# Patient Record
Sex: MALE | Race: WHITE | NOT HISPANIC OR LATINO | Employment: FULL TIME | URBAN - METROPOLITAN AREA
[De-identification: names, ages, dates, MRNs, and addresses within clinical notes are randomized per-mention and may not be internally consistent; named-entity substitution may affect disease eponyms.]

---

## 2017-12-12 ENCOUNTER — HOSPITAL ENCOUNTER (OUTPATIENT)
Dept: CARDIOLOGY | Facility: HOSPITAL | Age: 57
Discharge: HOME | End: 2017-12-12
Attending: INTERNAL MEDICINE | Admitting: INTERNAL MEDICINE

## 2019-05-14 ENCOUNTER — HOSPITAL ENCOUNTER (OUTPATIENT)
Dept: GASTROENTEROLOGY | Facility: HOSPITAL | Age: 59
Setting detail: HOSPITAL OUTPATIENT SURGERY
Discharge: HOME | End: 2019-05-14
Attending: INTERNAL MEDICINE

## 2019-05-14 LAB — GLUCOSE BLD-MCNC: 96 MG/DL (ref 70–99)

## 2019-08-02 ENCOUNTER — HOSPITAL ENCOUNTER (OUTPATIENT)
Dept: OTHER | Facility: HOSPITAL | Age: 59
Discharge: HOME | End: 2019-08-02
Attending: INTERNAL MEDICINE

## 2019-08-02 ENCOUNTER — OFFICE VISIT CONVERTED (OUTPATIENT)
Dept: INTERNAL MEDICINE | Facility: CLINIC | Age: 59
End: 2019-08-02
Attending: INTERNAL MEDICINE

## 2019-08-02 ENCOUNTER — CONVERSION ENCOUNTER (OUTPATIENT)
Dept: INTERNAL MEDICINE | Facility: CLINIC | Age: 59
End: 2019-08-02

## 2019-08-02 LAB
ALBUMIN SERPL-MCNC: 4.1 G/DL (ref 3.5–5)
ALBUMIN/GLOB SERPL: 1.4 {RATIO} (ref 1.4–2.6)
ALP SERPL-CCNC: 62 U/L (ref 56–119)
ALT SERPL-CCNC: 16 U/L (ref 10–40)
ANION GAP SERPL CALC-SCNC: 19 MMOL/L (ref 8–19)
AST SERPL-CCNC: 21 U/L (ref 15–50)
BASOPHILS # BLD AUTO: 0.17 10*3/UL (ref 0–0.2)
BASOPHILS NFR BLD AUTO: 1.4 % (ref 0–3)
BILIRUB SERPL-MCNC: 0.35 MG/DL (ref 0.2–1.3)
BUN SERPL-MCNC: 13 MG/DL (ref 5–25)
BUN/CREAT SERPL: 19 {RATIO} (ref 6–20)
CALCIUM SERPL-MCNC: 8.9 MG/DL (ref 8.7–10.4)
CHLORIDE SERPL-SCNC: 99 MMOL/L (ref 99–111)
CHOLEST SERPL-MCNC: 103 MG/DL (ref 107–200)
CHOLEST/HDLC SERPL: 2.5 {RATIO} (ref 3–6)
CONV ABS IMM GRAN: 0.03 10*3/UL (ref 0–0.2)
CONV CO2: 23 MMOL/L (ref 22–32)
CONV CREATININE URINE, RANDOM: 72 MG/DL (ref 10–300)
CONV HIV-1/ HIV-2: NEGATIVE
CONV IMMATURE GRAN: 0.3 % (ref 0–1.8)
CONV MICROALBUM.,U,RANDOM: 32.4 MG/L (ref 0–20)
CONV TOTAL PROTEIN: 7.1 G/DL (ref 6.3–8.2)
CREAT UR-MCNC: 0.69 MG/DL (ref 0.7–1.2)
DEPRECATED RDW RBC AUTO: 53.1 FL (ref 35.1–43.9)
EOSINOPHIL # BLD AUTO: 0.24 10*3/UL (ref 0–0.7)
EOSINOPHIL # BLD AUTO: 2 % (ref 0–7)
ERYTHROCYTE [DISTWIDTH] IN BLOOD BY AUTOMATED COUNT: 23.3 % (ref 11.6–14.4)
EST. AVERAGE GLUCOSE BLD GHB EST-MCNC: 105 MG/DL
FERRITIN SERPL-MCNC: 9 NG/ML (ref 30–300)
GFR SERPLBLD BASED ON 1.73 SQ M-ARVRAT: >60 ML/MIN/{1.73_M2}
GLOBULIN UR ELPH-MCNC: 3 G/DL (ref 2–3.5)
GLUCOSE SERPL-MCNC: 84 MG/DL (ref 70–99)
HBA1C MFR BLD: 5.3 % (ref 3.5–5.7)
HBA1C MFR BLD: 7.9 G/DL (ref 14–18)
HCT VFR BLD AUTO: 30.6 % (ref 42–52)
HDLC SERPL-MCNC: 42 MG/DL (ref 40–60)
IRON SATN MFR SERPL: 3 % (ref 20–55)
IRON SERPL-MCNC: 13 UG/DL (ref 70–180)
LDLC SERPL CALC-MCNC: 46 MG/DL (ref 70–100)
LYMPHOCYTES # BLD AUTO: 1.98 10*3/UL (ref 1–5)
MCH RBC QN AUTO: 17.5 PG (ref 27–31)
MCHC RBC AUTO-ENTMCNC: 25.8 G/DL (ref 33–37)
MCV RBC AUTO: 67.7 FL (ref 80–96)
MICROALBUMIN/CREAT UR: 45 MG/G{CRE} (ref 0–25)
MONOCYTES # BLD AUTO: 1.04 10*3/UL (ref 0.2–1.2)
MONOCYTES NFR BLD AUTO: 8.7 % (ref 3–10)
NEUTROPHILS # BLD AUTO: 8.48 10*3/UL (ref 2–8)
NEUTROPHILS NFR BLD AUTO: 71 % (ref 30–85)
NRBC CBCN: 5.7 % (ref 0–0.7)
OSMOLALITY SERPL CALC.SUM OF ELEC: 281 MOSM/KG (ref 273–304)
PLATELET # BLD AUTO: 683 10*3/UL (ref 130–400)
PMV BLD AUTO: 10.1 FL (ref 9.4–12.4)
POTASSIUM SERPL-SCNC: 4.7 MMOL/L (ref 3.5–5.3)
RBC # BLD AUTO: 4.52 10*6/UL (ref 4.7–6.1)
SODIUM SERPL-SCNC: 136 MMOL/L (ref 135–147)
T4 FREE SERPL-MCNC: 1.7 NG/DL (ref 0.9–1.8)
TIBC SERPL-MCNC: 466 UG/DL (ref 245–450)
TRANSFERRIN SERPL-MCNC: 326 MG/DL (ref 215–365)
TRIGL SERPL-MCNC: 74 MG/DL (ref 40–150)
TSH SERPL-ACNC: 1.12 M[IU]/L (ref 0.27–4.2)
VARIANT LYMPHS NFR BLD MANUAL: 16.6 % (ref 20–45)
VLDLC SERPL-MCNC: 15 MG/DL (ref 5–37)
WBC # BLD AUTO: 11.94 10*3/UL (ref 4.8–10.8)

## 2019-08-03 LAB
CONV HEPATITIS C AB WITH REFLEX TO CONFIRMATION: <0.1 S/CO RATIO (ref 0–0.9)
CONV HEPATITIS COMMENT: NORMAL

## 2019-08-13 ENCOUNTER — HOSPITAL ENCOUNTER (OUTPATIENT)
Dept: INFUSION THERAPY | Facility: HOSPITAL | Age: 59
Setting detail: RECURRING SERIES
Discharge: HOME | End: 2019-08-31
Attending: INTERNAL MEDICINE

## 2019-08-16 LAB — PSA SERPL-MCNC: 2.02 NG/ML (ref 0–4)

## 2019-09-04 ENCOUNTER — OFFICE VISIT CONVERTED (OUTPATIENT)
Dept: INTERNAL MEDICINE | Facility: CLINIC | Age: 59
End: 2019-09-04
Attending: INTERNAL MEDICINE

## 2019-09-17 ENCOUNTER — OFFICE VISIT CONVERTED (OUTPATIENT)
Dept: SURGERY | Facility: CLINIC | Age: 59
End: 2019-09-17
Attending: UROLOGY

## 2019-09-17 ENCOUNTER — CONVERSION ENCOUNTER (OUTPATIENT)
Dept: SURGERY | Facility: CLINIC | Age: 59
End: 2019-09-17

## 2019-09-25 ENCOUNTER — OFFICE VISIT CONVERTED (OUTPATIENT)
Dept: INTERNAL MEDICINE | Facility: CLINIC | Age: 59
End: 2019-09-25
Attending: PHYSICIAN ASSISTANT

## 2019-09-25 ENCOUNTER — HOSPITAL ENCOUNTER (OUTPATIENT)
Dept: OTHER | Facility: HOSPITAL | Age: 59
Discharge: HOME | End: 2019-09-25
Attending: PHYSICIAN ASSISTANT

## 2019-09-25 LAB
ALBUMIN SERPL-MCNC: 4.3 G/DL (ref 3.5–5)
ALBUMIN/GLOB SERPL: 1.2 {RATIO} (ref 1.4–2.6)
ALP SERPL-CCNC: 89 U/L (ref 56–119)
ALT SERPL-CCNC: 10 U/L (ref 10–40)
ANION GAP SERPL CALC-SCNC: 21 MMOL/L (ref 8–19)
AST SERPL-CCNC: 17 U/L (ref 15–50)
BASOPHILS # BLD AUTO: 0.11 10*3/UL (ref 0–0.2)
BASOPHILS NFR BLD AUTO: 0.8 % (ref 0–3)
BILIRUB SERPL-MCNC: 0.19 MG/DL (ref 0.2–1.3)
BUN SERPL-MCNC: 12 MG/DL (ref 5–25)
BUN/CREAT SERPL: 14 {RATIO} (ref 6–20)
CALCIUM SERPL-MCNC: 9.6 MG/DL (ref 8.7–10.4)
CHLORIDE SERPL-SCNC: 95 MMOL/L (ref 99–111)
CONV ABS IMM GRAN: 0.03 10*3/UL (ref 0–0.2)
CONV CO2: 24 MMOL/L (ref 22–32)
CONV IMMATURE GRAN: 0.2 % (ref 0–1.8)
CONV TOTAL PROTEIN: 7.9 G/DL (ref 6.3–8.2)
CREAT UR-MCNC: 0.83 MG/DL (ref 0.7–1.2)
CRP SERPL-MCNC: 26.4 MG/L (ref 0–5)
DEPRECATED RDW RBC AUTO: ABNORMAL FL (ref 35.1–43.9)
EOSINOPHIL # BLD AUTO: 0.07 10*3/UL (ref 0–0.7)
EOSINOPHIL # BLD AUTO: 0.5 % (ref 0–7)
ERYTHROCYTE [DISTWIDTH] IN BLOOD BY AUTOMATED COUNT: ABNORMAL % (ref 11.6–14.4)
GFR SERPLBLD BASED ON 1.73 SQ M-ARVRAT: >60 ML/MIN/{1.73_M2}
GLOBULIN UR ELPH-MCNC: 3.6 G/DL (ref 2–3.5)
GLUCOSE SERPL-MCNC: 117 MG/DL (ref 70–99)
HCT VFR BLD AUTO: 42.6 % (ref 42–52)
HGB BLD-MCNC: 12 G/DL (ref 14–18)
IRON SATN MFR SERPL: 8 % (ref 20–55)
IRON SERPL-MCNC: 30 UG/DL (ref 70–180)
LYMPHOCYTES # BLD AUTO: 2.54 10*3/UL (ref 1–5)
LYMPHOCYTES NFR BLD AUTO: 18.7 % (ref 20–45)
MCH RBC QN AUTO: 22.3 PG (ref 27–31)
MCHC RBC AUTO-ENTMCNC: 28.2 G/DL (ref 33–37)
MCV RBC AUTO: 79 FL (ref 80–96)
MONOCYTES # BLD AUTO: 1.31 10*3/UL (ref 0.2–1.2)
MONOCYTES NFR BLD AUTO: 9.7 % (ref 3–10)
NEUTROPHILS # BLD AUTO: 9.49 10*3/UL (ref 2–8)
NEUTROPHILS NFR BLD AUTO: 70.1 % (ref 30–85)
NRBC CBCN: 0 % (ref 0–0.7)
OSMOLALITY SERPL CALC.SUM OF ELEC: 283 MOSM/KG (ref 273–304)
PLATELET # BLD AUTO: 471 10*3/UL (ref 130–400)
PMV BLD AUTO: 10.1 FL (ref 9.4–12.4)
POTASSIUM SERPL-SCNC: 4.2 MMOL/L (ref 3.5–5.3)
RBC # BLD AUTO: 5.39 10*6/UL (ref 4.7–6.1)
SODIUM SERPL-SCNC: 136 MMOL/L (ref 135–147)
TIBC SERPL-MCNC: 392 UG/DL (ref 245–450)
TRANSFERRIN SERPL-MCNC: 274 MG/DL (ref 215–365)
WBC # BLD AUTO: 13.55 10*3/UL (ref 4.8–10.8)

## 2019-09-27 ENCOUNTER — OFFICE VISIT CONVERTED (OUTPATIENT)
Dept: INTERNAL MEDICINE | Facility: CLINIC | Age: 59
End: 2019-09-27
Attending: INTERNAL MEDICINE

## 2019-10-30 ENCOUNTER — HOSPITAL ENCOUNTER (OUTPATIENT)
Dept: INFUSION THERAPY | Facility: HOSPITAL | Age: 59
Setting detail: RECURRING SERIES
Discharge: HOME | End: 2019-10-31
Attending: INTERNAL MEDICINE

## 2020-02-24 ENCOUNTER — CONVERSION ENCOUNTER (OUTPATIENT)
Dept: INTERNAL MEDICINE | Facility: CLINIC | Age: 60
End: 2020-02-24

## 2020-02-24 ENCOUNTER — OFFICE VISIT CONVERTED (OUTPATIENT)
Dept: INTERNAL MEDICINE | Facility: CLINIC | Age: 60
End: 2020-02-24
Attending: INTERNAL MEDICINE

## 2020-02-24 ENCOUNTER — HOSPITAL ENCOUNTER (OUTPATIENT)
Dept: OTHER | Facility: HOSPITAL | Age: 60
Discharge: HOME | End: 2020-02-24
Attending: INTERNAL MEDICINE

## 2020-02-24 LAB
ALBUMIN SERPL-MCNC: 4.4 G/DL (ref 3.5–5)
ALBUMIN/GLOB SERPL: 1.6 {RATIO} (ref 1.4–2.6)
ALP SERPL-CCNC: 74 U/L (ref 56–119)
ALT SERPL-CCNC: 20 U/L (ref 10–40)
ANION GAP SERPL CALC-SCNC: 22 MMOL/L (ref 8–19)
AST SERPL-CCNC: 25 U/L (ref 15–50)
BASOPHILS # BLD AUTO: 0.08 10*3/UL (ref 0–0.2)
BASOPHILS NFR BLD AUTO: 0.8 % (ref 0–3)
BILIRUB SERPL-MCNC: 0.27 MG/DL (ref 0.2–1.3)
BUN SERPL-MCNC: 9 MG/DL (ref 5–25)
BUN/CREAT SERPL: 13 {RATIO} (ref 6–20)
CALCIUM SERPL-MCNC: 9.5 MG/DL (ref 8.7–10.4)
CHLORIDE SERPL-SCNC: 97 MMOL/L (ref 99–111)
CHOLEST SERPL-MCNC: 112 MG/DL (ref 107–200)
CHOLEST/HDLC SERPL: 2.1 {RATIO} (ref 3–6)
CONV ABS IMM GRAN: 0.03 10*3/UL (ref 0–0.2)
CONV CO2: 21 MMOL/L (ref 22–32)
CONV CREATININE URINE, RANDOM: 26.7 MG/DL (ref 10–300)
CONV IMMATURE GRAN: 0.3 % (ref 0–1.8)
CONV MICROALBUM.,U,RANDOM: <12 MG/L (ref 0–20)
CONV TOTAL PROTEIN: 7.1 G/DL (ref 6.3–8.2)
CREAT UR-MCNC: 0.69 MG/DL (ref 0.7–1.2)
DEPRECATED RDW RBC AUTO: 62.9 FL (ref 35.1–43.9)
EOSINOPHIL # BLD AUTO: 0.51 10*3/UL (ref 0–0.7)
EOSINOPHIL # BLD AUTO: 5.1 % (ref 0–7)
ERYTHROCYTE [DISTWIDTH] IN BLOOD BY AUTOMATED COUNT: 18.8 % (ref 11.6–14.4)
EST. AVERAGE GLUCOSE BLD GHB EST-MCNC: 105 MG/DL
GFR SERPLBLD BASED ON 1.73 SQ M-ARVRAT: >60 ML/MIN/{1.73_M2}
GLOBULIN UR ELPH-MCNC: 2.7 G/DL (ref 2–3.5)
GLUCOSE SERPL-MCNC: 77 MG/DL (ref 70–99)
HBA1C MFR BLD: 5.3 % (ref 3.5–5.7)
HCT VFR BLD AUTO: 44.9 % (ref 42–52)
HDLC SERPL-MCNC: 54 MG/DL (ref 40–60)
HGB BLD-MCNC: 14.5 G/DL (ref 14–18)
LDLC SERPL CALC-MCNC: 36 MG/DL (ref 70–100)
LYMPHOCYTES # BLD AUTO: 2.17 10*3/UL (ref 1–5)
LYMPHOCYTES NFR BLD AUTO: 21.9 % (ref 20–45)
MCH RBC QN AUTO: 29.3 PG (ref 27–31)
MCHC RBC AUTO-ENTMCNC: 32.3 G/DL (ref 33–37)
MCV RBC AUTO: 90.7 FL (ref 80–96)
MICROALBUMIN/CREAT UR: 44.9 MG/G{CRE} (ref 0–25)
MONOCYTES # BLD AUTO: 0.91 10*3/UL (ref 0.2–1.2)
MONOCYTES NFR BLD AUTO: 9.2 % (ref 3–10)
NEUTROPHILS # BLD AUTO: 6.22 10*3/UL (ref 2–8)
NEUTROPHILS NFR BLD AUTO: 62.7 % (ref 30–85)
NRBC CBCN: 0 % (ref 0–0.7)
OSMOLALITY SERPL CALC.SUM OF ELEC: 277 MOSM/KG (ref 273–304)
PLATELET # BLD AUTO: 423 10*3/UL (ref 130–400)
PMV BLD AUTO: 12.6 FL (ref 9.4–12.4)
POTASSIUM SERPL-SCNC: 4.9 MMOL/L (ref 3.5–5.3)
RBC # BLD AUTO: 4.95 10*6/UL (ref 4.7–6.1)
SODIUM SERPL-SCNC: 135 MMOL/L (ref 135–147)
T4 FREE SERPL-MCNC: 1.7 NG/DL (ref 0.9–1.8)
TRIGL SERPL-MCNC: 111 MG/DL (ref 40–150)
TSH SERPL-ACNC: 1.13 M[IU]/L (ref 0.27–4.2)
VLDLC SERPL-MCNC: 22 MG/DL (ref 5–37)
WBC # BLD AUTO: 9.92 10*3/UL (ref 4.8–10.8)

## 2020-06-30 ENCOUNTER — OFFICE VISIT CONVERTED (OUTPATIENT)
Dept: INTERNAL MEDICINE | Facility: CLINIC | Age: 60
End: 2020-06-30
Attending: INTERNAL MEDICINE

## 2020-06-30 ENCOUNTER — HOSPITAL ENCOUNTER (OUTPATIENT)
Dept: OTHER | Facility: HOSPITAL | Age: 60
Discharge: HOME | End: 2020-06-30
Attending: INTERNAL MEDICINE

## 2020-06-30 LAB
BASOPHILS # BLD AUTO: 0.08 10*3/UL (ref 0–0.2)
BASOPHILS NFR BLD AUTO: 0.9 % (ref 0–3)
CONV ABS IMM GRAN: 0.02 10*3/UL (ref 0–0.2)
CONV IMMATURE GRAN: 0.2 % (ref 0–1.8)
DEPRECATED RDW RBC AUTO: 53.3 FL (ref 35.1–43.9)
EOSINOPHIL # BLD AUTO: 0.15 10*3/UL (ref 0–0.7)
EOSINOPHIL # BLD AUTO: 1.7 % (ref 0–7)
ERYTHROCYTE [DISTWIDTH] IN BLOOD BY AUTOMATED COUNT: 15.4 % (ref 11.6–14.4)
HCT VFR BLD AUTO: 41.3 % (ref 42–52)
HGB BLD-MCNC: 13.4 G/DL (ref 14–18)
LYMPHOCYTES # BLD AUTO: 1.81 10*3/UL (ref 1–5)
LYMPHOCYTES NFR BLD AUTO: 20.5 % (ref 20–45)
MCH RBC QN AUTO: 30.5 PG (ref 27–31)
MCHC RBC AUTO-ENTMCNC: 32.4 G/DL (ref 33–37)
MCV RBC AUTO: 94.1 FL (ref 80–96)
MONOCYTES # BLD AUTO: 0.77 10*3/UL (ref 0.2–1.2)
MONOCYTES NFR BLD AUTO: 8.7 % (ref 3–10)
NEUTROPHILS # BLD AUTO: 6 10*3/UL (ref 2–8)
NEUTROPHILS NFR BLD AUTO: 68 % (ref 30–85)
NRBC CBCN: 0 % (ref 0–0.7)
PLATELET # BLD AUTO: 346 10*3/UL (ref 130–400)
PMV BLD AUTO: 12.4 FL (ref 9.4–12.4)
RBC # BLD AUTO: 4.39 10*6/UL (ref 4.7–6.1)
WBC # BLD AUTO: 8.83 10*3/UL (ref 4.8–10.8)

## 2020-07-01 LAB
ALBUMIN SERPL-MCNC: 4.2 G/DL (ref 3.5–5)
ALBUMIN/GLOB SERPL: 1.5 {RATIO} (ref 1.4–2.6)
ALP SERPL-CCNC: 60 U/L (ref 56–119)
ALT SERPL-CCNC: 17 U/L (ref 10–40)
ANION GAP SERPL CALC-SCNC: 18 MMOL/L (ref 8–19)
AST SERPL-CCNC: 33 U/L (ref 15–50)
BILIRUB SERPL-MCNC: 0.62 MG/DL (ref 0.2–1.3)
BUN SERPL-MCNC: 7 MG/DL (ref 5–25)
BUN/CREAT SERPL: 9 {RATIO} (ref 6–20)
CALCIUM SERPL-MCNC: 9.5 MG/DL (ref 8.7–10.4)
CHLORIDE SERPL-SCNC: 100 MMOL/L (ref 99–111)
CHOLEST SERPL-MCNC: 119 MG/DL (ref 107–200)
CHOLEST/HDLC SERPL: 2.2 {RATIO} (ref 3–6)
CONV CO2: 25 MMOL/L (ref 22–32)
CONV TOTAL PROTEIN: 7 G/DL (ref 6.3–8.2)
CREAT UR-MCNC: 0.81 MG/DL (ref 0.7–1.2)
EST. AVERAGE GLUCOSE BLD GHB EST-MCNC: 105 MG/DL
GFR SERPLBLD BASED ON 1.73 SQ M-ARVRAT: >60 ML/MIN/{1.73_M2}
GLOBULIN UR ELPH-MCNC: 2.8 G/DL (ref 2–3.5)
GLUCOSE SERPL-MCNC: 96 MG/DL (ref 70–99)
HBA1C MFR BLD: 5.3 % (ref 3.5–5.7)
HDLC SERPL-MCNC: 53 MG/DL (ref 40–60)
LDLC SERPL CALC-MCNC: 52 MG/DL (ref 70–100)
OSMOLALITY SERPL CALC.SUM OF ELEC: 284 MOSM/KG (ref 273–304)
POTASSIUM SERPL-SCNC: 4.9 MMOL/L (ref 3.5–5.3)
SODIUM SERPL-SCNC: 138 MMOL/L (ref 135–147)
T4 FREE SERPL-MCNC: 1.7 NG/DL (ref 0.9–1.8)
TRIGL SERPL-MCNC: 69 MG/DL (ref 40–150)
TSH SERPL-ACNC: 0.87 M[IU]/L (ref 0.27–4.2)
VLDLC SERPL-MCNC: 14 MG/DL (ref 5–37)

## 2020-08-21 ENCOUNTER — HOSPITAL ENCOUNTER (OUTPATIENT)
Dept: URGENT CARE | Facility: CLINIC | Age: 60
Discharge: HOME | End: 2020-08-21

## 2020-08-24 LAB — SARS-COV-2 RNA SPEC QL NAA+PROBE: NOT DETECTED

## 2020-09-25 ENCOUNTER — HOSPITAL ENCOUNTER (OUTPATIENT)
Dept: URGENT CARE | Facility: CLINIC | Age: 60
Discharge: HOME | End: 2020-09-25

## 2020-09-29 LAB — SARS-COV-2 RNA SPEC QL NAA+PROBE: NOT DETECTED

## 2020-10-05 ENCOUNTER — HOSPITAL ENCOUNTER (OUTPATIENT)
Dept: URGENT CARE | Facility: CLINIC | Age: 60
Discharge: HOME | End: 2020-10-05

## 2020-10-08 LAB — SARS-COV-2 RNA SPEC QL NAA+PROBE: NOT DETECTED

## 2020-10-15 ENCOUNTER — OFFICE VISIT (OUTPATIENT)
Dept: CARDIOLOGY | Facility: CLINIC | Age: 60
End: 2020-10-15

## 2020-10-15 VITALS
WEIGHT: 164 LBS | DIASTOLIC BLOOD PRESSURE: 78 MMHG | SYSTOLIC BLOOD PRESSURE: 152 MMHG | HEIGHT: 75 IN | HEART RATE: 75 BPM | BODY MASS INDEX: 20.39 KG/M2

## 2020-10-15 DIAGNOSIS — E11.9 TYPE 2 DIABETES MELLITUS WITHOUT COMPLICATION, UNSPECIFIED WHETHER LONG TERM INSULIN USE (HCC): ICD-10-CM

## 2020-10-15 DIAGNOSIS — I10 ESSENTIAL HYPERTENSION: ICD-10-CM

## 2020-10-15 DIAGNOSIS — I42.9 CARDIOMYOPATHY, UNSPECIFIED TYPE (HCC): ICD-10-CM

## 2020-10-15 DIAGNOSIS — I25.10 CORONARY ARTERY DISEASE INVOLVING NATIVE CORONARY ARTERY OF NATIVE HEART WITHOUT ANGINA PECTORIS: Primary | ICD-10-CM

## 2020-10-15 DIAGNOSIS — E78.2 MIXED HYPERLIPIDEMIA: ICD-10-CM

## 2020-10-15 PROBLEM — I21.3 ST ELEVATION (STEMI) MYOCARDIAL INFARCTION (HCC): Status: ACTIVE | Noted: 2020-10-15

## 2020-10-15 PROBLEM — E78.5 HYPERLIPIDEMIA: Status: ACTIVE | Noted: 2020-10-15

## 2020-10-15 PROBLEM — E03.9 HYPOTHYROIDISM: Status: ACTIVE | Noted: 2020-10-15

## 2020-10-15 PROCEDURE — 93000 ELECTROCARDIOGRAM COMPLETE: CPT | Performed by: INTERNAL MEDICINE

## 2020-10-15 PROCEDURE — 99214 OFFICE O/P EST MOD 30 MIN: CPT | Performed by: INTERNAL MEDICINE

## 2020-10-15 RX ORDER — LOSARTAN POTASSIUM 100 MG/1
1 TABLET ORAL DAILY
COMMUNITY
Start: 2020-07-22 | End: 2021-08-02 | Stop reason: SDUPTHER

## 2020-10-15 RX ORDER — LISINOPRIL 10 MG/1
1 TABLET ORAL DAILY
COMMUNITY
Start: 2012-11-21 | End: 2020-10-15

## 2020-10-15 RX ORDER — ASPIRIN 81 MG/1
1 TABLET, COATED ORAL DAILY
COMMUNITY
Start: 2020-07-27 | End: 2022-01-17

## 2020-10-15 RX ORDER — EZETIMIBE AND SIMVASTATIN 10; 40 MG/1; MG/1
1 TABLET ORAL DAILY
COMMUNITY
Start: 2012-11-21 | End: 2020-10-15

## 2020-10-15 RX ORDER — EMPAGLIFLOZIN 25 MG/1
1 TABLET, FILM COATED ORAL DAILY
COMMUNITY
Start: 2020-07-11 | End: 2020-10-15

## 2020-10-15 RX ORDER — SIMVASTATIN 40 MG
1 TABLET ORAL DAILY
COMMUNITY
Start: 2020-09-29 | End: 2021-09-14 | Stop reason: SDUPTHER

## 2020-10-15 RX ORDER — LEVOTHYROXINE SODIUM 0.15 MG/1
1 TABLET ORAL DAILY
COMMUNITY
Start: 2020-08-25 | End: 2021-06-07 | Stop reason: SDUPTHER

## 2020-10-15 NOTE — PROGRESS NOTES
Date of Office Visit: 10/15/2020  Encounter Provider:Dr. Froy Mclain  Place of Service: [facility] [place]  Patient Name: Juan Miguel Chairez  :1960  Alex South Jr., MD    Chief Complaint   Patient presents with   • Coronary Artery Disease     2 year follow up   • Hypertension   • Hyperlipidemia   • Cardiomyopathy   • Diabetes     History of Present Illness    I am pleased to see Mr. Chairez in my office today for a follow-up     As you know, patient is 56 years old white gentleman whose past medical history is significant for hypertension, hyperlipidemia, diabetes mellitus, coronary artery disease, status post coronary artery bypass graft, cardiomyopathy, who came today for yearly  followup.     In , patient had cardiac workup for his symptom of chest pain.  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  Patient underwent cardiac catheterization which showed 100% lad stenosis, 25% LCX stenosis, RCA had 90% stenosis.  Patient underwent  left ventricle aneurysmectomy and CABG. In 2017, patient had echocardiogram which showed EF of 55% and akinetic distal anterior wall.  Mild aortic stenosis noted.    Patient came today for yearly follow-up.  Patient came after 2 years.  Patient is doing very well.  He lost 40 pounds by intention.  He is without antidiabetic medicine.  Patient denies any chest pain or tightness or heaviness.  No orthopnea or PND no leg edema noted.  No palpitation.    EKG showed sinus rhythm with PVCs.  Left atrial enlargement and abnormality noted.    At this stage, I would recommend that patient should proceed with echocardiogram.  Clinically patient has relatively loud ejection systolic murmur.  Patient had mild aortic stenosis in the past.  I suspect it has progressed.  I would also assess left ventricle function.  His blood pressure is elevated but his blood pressure at home has been stable.  Continue current  therapy.        Past Medical History:   Diagnosis Date   • Benign essential HTN    • CAD (coronary artery disease) of artery bypass graft    • Cardiomyopathy (CMS/HCC)    • Coronary artery disease    • Diabetes (CMS/HCC)    • Hyperlipidemia, mixed          Past Surgical History:   Procedure Laterality Date   • ABDOMINAL AORTIC ANEURYSM REPAIR     • CORONARY ARTERY BYPASS GRAFT  2010           Current Outpatient Medications:   •  Aspirin Low Dose 81 MG EC tablet, 1 tablet Daily., Disp: , Rfl:   •  levothyroxine (SYNTHROID, LEVOTHROID) 150 MCG tablet, 1 tablet Daily., Disp: , Rfl:   •  losartan (COZAAR) 100 MG tablet, 1 tablet Daily., Disp: , Rfl:   •  metoprolol tartrate (LOPRESSOR) 25 MG tablet, TAKE ONE TABLET BY MOUTH TWICE A DAY, Disp: 60 tablet, Rfl: 2  •  simvastatin (ZOCOR) 40 MG tablet, 1 tablet Daily., Disp: , Rfl:       Social History     Socioeconomic History   • Marital status:      Spouse name: Not on file   • Number of children: Not on file   • Years of education: Not on file   • Highest education level: Not on file   Tobacco Use   • Smoking status: Former Smoker   • Smokeless tobacco: Never Used   Substance and Sexual Activity   • Alcohol use: Yes   • Drug use: Never   • Sexual activity: Defer         Review of Systems   Constitution: Negative for chills and fever.   HENT: Negative for ear discharge and nosebleeds.    Eyes: Negative for discharge and redness.   Cardiovascular: Negative for chest pain, orthopnea, palpitations, paroxysmal nocturnal dyspnea and syncope.   Respiratory: Positive for shortness of breath. Negative for cough and wheezing.    Endocrine: Negative for heat intolerance.   Skin: Negative for rash.   Musculoskeletal: Positive for arthritis and joint pain. Negative for myalgias.   Gastrointestinal: Negative for abdominal pain, melena, nausea and vomiting.   Genitourinary: Negative for dysuria and hematuria.   Neurological: Negative for dizziness, light-headedness, numbness and  "tremors.   Psychiatric/Behavioral: Negative for depression. The patient is not nervous/anxious.        Procedures      ECG 12 Lead    Date/Time: 10/15/2020 12:45 PM  Performed by: Froy Mclain MD  Authorized by: Froy Mclain MD   Comparison: compared with previous ECG   Similar to previous ECG  Rhythm: sinus rhythm  Ectopy: unifocal PVCs    Clinical impression: normal ECG            ECG 12 Lead    (Results Pending)           Objective:    /78   Pulse 75   Ht 190.5 cm (75\")   Wt 74.4 kg (164 lb)   BMI 20.50 kg/m²         Constitutional:       Appearance: Well-developed.   Eyes:      General: No scleral icterus.        Right eye: No discharge.   HENT:      Head: Normocephalic and atraumatic.   Neck:      Thyroid: No thyromegaly.      Lymphadenopathy: No cervical adenopathy.   Pulmonary:      Effort: Pulmonary effort is normal. No respiratory distress.      Breath sounds: Normal breath sounds. No wheezing. No rales.   Cardiovascular:      Normal rate. Regular rhythm.      No gallop.   Abdominal:      Tenderness: There is no abdominal tenderness.   Skin:     Findings: No erythema or rash.   Neurological:      Mental Status: Alert and oriented to person, place, and time.             Assessment:       Diagnosis Plan   1. Coronary artery disease involving native coronary artery of native heart without angina pectoris  ECG 12 Lead    Adult Transthoracic Echo Complete W/ Cont if Necessary Per Protocol   2. Mixed hyperlipidemia  ECG 12 Lead    Adult Transthoracic Echo Complete W/ Cont if Necessary Per Protocol   3. Essential hypertension  ECG 12 Lead    Adult Transthoracic Echo Complete W/ Cont if Necessary Per Protocol   4. Cardiomyopathy, unspecified type (CMS/HCC)  ECG 12 Lead    Adult Transthoracic Echo Complete W/ Cont if Necessary Per Protocol   5. Type 2 diabetes mellitus without complication, unspecified whether long term insulin use (CMS/HCC)  ECG 12 Lead    Adult Transthoracic Echo Complete W/ Cont if " Necessary Per Protocol            Plan:       At this stage continue current therapy.  Blood pressure monitoring is recommended.  Proceed with echocardiogram.

## 2020-11-02 ENCOUNTER — HOSPITAL ENCOUNTER (OUTPATIENT)
Dept: CARDIOLOGY | Facility: HOSPITAL | Age: 60
Discharge: HOME | End: 2020-11-02
Admitting: INTERNAL MEDICINE

## 2020-11-02 VITALS — WEIGHT: 164.02 LBS | HEIGHT: 75 IN | BODY MASS INDEX: 20.39 KG/M2

## 2020-11-02 DIAGNOSIS — I42.9 CARDIOMYOPATHY, UNSPECIFIED TYPE (HCC): ICD-10-CM

## 2020-11-02 DIAGNOSIS — I10 ESSENTIAL HYPERTENSION: ICD-10-CM

## 2020-11-02 DIAGNOSIS — E11.9 TYPE 2 DIABETES MELLITUS WITHOUT COMPLICATION, UNSPECIFIED WHETHER LONG TERM INSULIN USE (HCC): ICD-10-CM

## 2020-11-02 DIAGNOSIS — E78.2 MIXED HYPERLIPIDEMIA: ICD-10-CM

## 2020-11-02 DIAGNOSIS — I25.10 CORONARY ARTERY DISEASE INVOLVING NATIVE CORONARY ARTERY OF NATIVE HEART WITHOUT ANGINA PECTORIS: ICD-10-CM

## 2020-11-02 PROCEDURE — 93306 TTE W/DOPPLER COMPLETE: CPT

## 2020-11-02 PROCEDURE — 93306 TTE W/DOPPLER COMPLETE: CPT | Performed by: INTERNAL MEDICINE

## 2020-11-06 ENCOUNTER — TELEPHONE (OUTPATIENT)
Dept: CARDIOLOGY | Facility: CLINIC | Age: 60
End: 2020-11-06

## 2020-11-06 LAB
BH CV ECHO MEAS - ACS: 0.41 CM
BH CV ECHO MEAS - AI DEC SLOPE: 329.1 CM/SEC^2
BH CV ECHO MEAS - AI DEC TIME: 1.3 SEC
BH CV ECHO MEAS - AI MAX PG: 71.2 MMHG
BH CV ECHO MEAS - AI MAX VEL: 421.3 CM/SEC
BH CV ECHO MEAS - AI P1/2T: 375 MSEC
BH CV ECHO MEAS - AO MAX PG (FULL): 47.9 MMHG
BH CV ECHO MEAS - AO MAX PG: 50.2 MMHG
BH CV ECHO MEAS - AO MEAN PG (FULL): 12.5 MMHG
BH CV ECHO MEAS - AO MEAN PG: 13.4 MMHG
BH CV ECHO MEAS - AO ROOT AREA (BSA CORRECTED): 2
BH CV ECHO MEAS - AO ROOT AREA: 12.2 CM^2
BH CV ECHO MEAS - AO ROOT DIAM: 3.9 CM
BH CV ECHO MEAS - AO V2 MAX: 344.5 CM/SEC
BH CV ECHO MEAS - AO V2 MEAN: 172.4 CM/SEC
BH CV ECHO MEAS - AO V2 VTI: 60.5 CM
BH CV ECHO MEAS - AVA(I,A): 0.83 CM^2
BH CV ECHO MEAS - AVA(I,D): 0.83 CM^2
BH CV ECHO MEAS - AVA(V,A): 0.68 CM^2
BH CV ECHO MEAS - AVA(V,D): 0.68 CM^2
BH CV ECHO MEAS - BSA(HAYCOCK): 2 M^2
BH CV ECHO MEAS - BSA: 2 M^2
BH CV ECHO MEAS - BZI_BMI: 20.5 KILOGRAMS/M^2
BH CV ECHO MEAS - BZI_METRIC_HEIGHT: 190.5 CM
BH CV ECHO MEAS - BZI_METRIC_WEIGHT: 74.4 KG
BH CV ECHO MEAS - EDV(CUBED): 127.6 ML
BH CV ECHO MEAS - EDV(MOD-SP4): 63.3 ML
BH CV ECHO MEAS - EDV(TEICH): 120.1 ML
BH CV ECHO MEAS - EF(CUBED): 49 %
BH CV ECHO MEAS - EF(MOD-SP4): 39.2 %
BH CV ECHO MEAS - EF(TEICH): 41 %
BH CV ECHO MEAS - ESV(CUBED): 65.1 ML
BH CV ECHO MEAS - ESV(MOD-SP4): 38.4 ML
BH CV ECHO MEAS - ESV(TEICH): 70.9 ML
BH CV ECHO MEAS - FS: 20.1 %
BH CV ECHO MEAS - IVS/LVPW: 1.1
BH CV ECHO MEAS - IVSD: 1.3 CM
BH CV ECHO MEAS - LA DIMENSION: 3.4 CM
BH CV ECHO MEAS - LA/AO: 0.86
BH CV ECHO MEAS - LV DIASTOLIC VOL/BSA (35-75): 31.4 ML/M^2
BH CV ECHO MEAS - LV MASS(C)D: 253 GRAMS
BH CV ECHO MEAS - LV MASS(C)DI: 125.5 GRAMS/M^2
BH CV ECHO MEAS - LV MAX PG: 2.3 MMHG
BH CV ECHO MEAS - LV MEAN PG: 0.93 MMHG
BH CV ECHO MEAS - LV SYSTOLIC VOL/BSA (12-30): 19.1 ML/M^2
BH CV ECHO MEAS - LV V1 MAX: 76 CM/SEC
BH CV ECHO MEAS - LV V1 MEAN: 42.6 CM/SEC
BH CV ECHO MEAS - LV V1 VTI: 16.4 CM
BH CV ECHO MEAS - LVIDD: 5 CM
BH CV ECHO MEAS - LVIDS: 4 CM
BH CV ECHO MEAS - LVOT AREA: 3.1 CM^2
BH CV ECHO MEAS - LVOT DIAM: 2 CM
BH CV ECHO MEAS - LVPWD: 1.2 CM
BH CV ECHO MEAS - MR MAX PG: 153 MMHG
BH CV ECHO MEAS - MR MAX VEL: 618.3 CM/SEC
BH CV ECHO MEAS - MV A MAX VEL: 90.3 CM/SEC
BH CV ECHO MEAS - MV E MAX VEL: 164.2 CM/SEC
BH CV ECHO MEAS - MV E/A: 1.8
BH CV ECHO MEAS - MV MAX PG: 17.7 MMHG
BH CV ECHO MEAS - MV MEAN PG: 7.7 MMHG
BH CV ECHO MEAS - MV V2 MAX: 210.1 CM/SEC
BH CV ECHO MEAS - MV V2 MEAN: 129.9 CM/SEC
BH CV ECHO MEAS - MV V2 VTI: 51.9 CM
BH CV ECHO MEAS - MVA(VTI): 0.97 CM^2
BH CV ECHO MEAS - PA ACC TIME: 0.12 SEC
BH CV ECHO MEAS - PA MAX PG: 11.6 MMHG
BH CV ECHO MEAS - PA PR(ACCEL): 23.8 MMHG
BH CV ECHO MEAS - PA V2 MAX: 170.3 CM/SEC
BH CV ECHO MEAS - PI END-D VEL: 124 CM/SEC
BH CV ECHO MEAS - RAP SYSTOLE: 10 MMHG
BH CV ECHO MEAS - RVDD: 2.3 CM
BH CV ECHO MEAS - RVSP: 70.5 MMHG
BH CV ECHO MEAS - SI(AO): 367.1 ML/M^2
BH CV ECHO MEAS - SI(CUBED): 31 ML/M^2
BH CV ECHO MEAS - SI(LVOT): 25 ML/M^2
BH CV ECHO MEAS - SI(MOD-SP4): 12.3 ML/M^2
BH CV ECHO MEAS - SI(TEICH): 24.4 ML/M^2
BH CV ECHO MEAS - SV(AO): 740.4 ML
BH CV ECHO MEAS - SV(CUBED): 62.5 ML
BH CV ECHO MEAS - SV(LVOT): 50.5 ML
BH CV ECHO MEAS - SV(MOD-SP4): 24.8 ML
BH CV ECHO MEAS - SV(TEICH): 49.2 ML
BH CV ECHO MEAS - TR MAX VEL: 350.1 CM/SEC
MAXIMAL PREDICTED HEART RATE: 162 BPM
STRESS TARGET HR: 138 BPM

## 2020-11-09 ENCOUNTER — TELEPHONE (OUTPATIENT)
Dept: CARDIOLOGY | Facility: CLINIC | Age: 60
End: 2020-11-09

## 2020-11-17 ENCOUNTER — HOSPITAL ENCOUNTER (OUTPATIENT)
Dept: URGENT CARE | Facility: CLINIC | Age: 60
Discharge: HOME | End: 2020-11-17

## 2020-11-20 LAB — SARS-COV-2 RNA SPEC QL NAA+PROBE: NOT DETECTED

## 2020-11-24 ENCOUNTER — OFFICE VISIT CONVERTED (OUTPATIENT)
Dept: INTERNAL MEDICINE | Facility: CLINIC | Age: 60
End: 2020-11-24
Attending: INTERNAL MEDICINE

## 2020-11-24 ENCOUNTER — OFFICE VISIT CONVERTED (OUTPATIENT)
Dept: SURGERY | Facility: CLINIC | Age: 60
End: 2020-11-24
Attending: SURGERY

## 2021-01-04 ENCOUNTER — OFFICE VISIT CONVERTED (OUTPATIENT)
Dept: INTERNAL MEDICINE | Facility: CLINIC | Age: 61
End: 2021-01-04
Attending: INTERNAL MEDICINE

## 2021-01-04 ENCOUNTER — HOSPITAL ENCOUNTER (OUTPATIENT)
Dept: OTHER | Facility: HOSPITAL | Age: 61
Discharge: HOME | End: 2021-01-04
Attending: INTERNAL MEDICINE

## 2021-01-04 LAB
ALBUMIN SERPL-MCNC: 4 G/DL (ref 3.5–5)
ALBUMIN/GLOB SERPL: 1.4 {RATIO} (ref 1.4–2.6)
ALP SERPL-CCNC: 152 U/L (ref 56–119)
ALT SERPL-CCNC: 37 U/L (ref 10–40)
ANION GAP SERPL CALC-SCNC: 17 MMOL/L (ref 8–19)
AST SERPL-CCNC: 38 U/L (ref 15–50)
BASOPHILS # BLD AUTO: 0.05 10*3/UL (ref 0–0.2)
BASOPHILS NFR BLD AUTO: 0.5 % (ref 0–3)
BILIRUB SERPL-MCNC: 0.54 MG/DL (ref 0.2–1.3)
BUN SERPL-MCNC: 9 MG/DL (ref 5–25)
BUN/CREAT SERPL: 12 {RATIO} (ref 6–20)
CALCIUM SERPL-MCNC: 9.3 MG/DL (ref 8.7–10.4)
CHLORIDE SERPL-SCNC: 99 MMOL/L (ref 99–111)
CHOLEST SERPL-MCNC: 150 MG/DL (ref 107–200)
CHOLEST/HDLC SERPL: 2.7 {RATIO} (ref 3–6)
CONV ABS IMM GRAN: 0.02 10*3/UL (ref 0–0.2)
CONV CO2: 24 MMOL/L (ref 22–32)
CONV IMMATURE GRAN: 0.2 % (ref 0–1.8)
CONV TOTAL PROTEIN: 6.8 G/DL (ref 6.3–8.2)
CREAT UR-MCNC: 0.75 MG/DL (ref 0.7–1.2)
DEPRECATED RDW RBC AUTO: 53.1 FL (ref 35.1–43.9)
EOSINOPHIL # BLD AUTO: 0.14 10*3/UL (ref 0–0.7)
EOSINOPHIL # BLD AUTO: 1.5 % (ref 0–7)
ERYTHROCYTE [DISTWIDTH] IN BLOOD BY AUTOMATED COUNT: 15.9 % (ref 11.6–14.4)
GFR SERPLBLD BASED ON 1.73 SQ M-ARVRAT: >60 ML/MIN/{1.73_M2}
GLOBULIN UR ELPH-MCNC: 2.8 G/DL (ref 2–3.5)
GLUCOSE SERPL-MCNC: 85 MG/DL (ref 70–99)
HCT VFR BLD AUTO: 39 % (ref 42–52)
HDLC SERPL-MCNC: 56 MG/DL (ref 40–60)
HGB BLD-MCNC: 12.9 G/DL (ref 14–18)
LDLC SERPL CALC-MCNC: 79 MG/DL (ref 70–100)
LYMPHOCYTES # BLD AUTO: 1.78 10*3/UL (ref 1–5)
LYMPHOCYTES NFR BLD AUTO: 19.1 % (ref 20–45)
MCH RBC QN AUTO: 30.1 PG (ref 27–31)
MCHC RBC AUTO-ENTMCNC: 33.1 G/DL (ref 33–37)
MCV RBC AUTO: 90.9 FL (ref 80–96)
MONOCYTES # BLD AUTO: 0.81 10*3/UL (ref 0.2–1.2)
MONOCYTES NFR BLD AUTO: 8.7 % (ref 3–10)
NEUTROPHILS # BLD AUTO: 6.51 10*3/UL (ref 2–8)
NEUTROPHILS NFR BLD AUTO: 70 % (ref 30–85)
NRBC CBCN: 0 % (ref 0–0.7)
OSMOLALITY SERPL CALC.SUM OF ELEC: 278 MOSM/KG (ref 273–304)
PLATELET # BLD AUTO: 371 10*3/UL (ref 130–400)
PMV BLD AUTO: 11.6 FL (ref 9.4–12.4)
POTASSIUM SERPL-SCNC: 4.8 MMOL/L (ref 3.5–5.3)
PSA SERPL-MCNC: 2.63 NG/ML (ref 0–4)
RBC # BLD AUTO: 4.29 10*6/UL (ref 4.7–6.1)
SODIUM SERPL-SCNC: 135 MMOL/L (ref 135–147)
TRIGL SERPL-MCNC: 77 MG/DL (ref 40–150)
TSH SERPL-ACNC: 1.4 M[IU]/L (ref 0.27–4.2)
VLDLC SERPL-MCNC: 15 MG/DL (ref 5–37)
WBC # BLD AUTO: 9.31 10*3/UL (ref 4.8–10.8)

## 2021-01-05 LAB
EST. AVERAGE GLUCOSE BLD GHB EST-MCNC: 97 MG/DL
HBA1C MFR BLD: 5 % (ref 3.5–5.7)

## 2021-05-03 ENCOUNTER — OFFICE VISIT (OUTPATIENT)
Dept: CARDIOLOGY | Facility: CLINIC | Age: 61
End: 2021-05-03

## 2021-05-03 VITALS
WEIGHT: 163.8 LBS | HEART RATE: 62 BPM | HEIGHT: 75 IN | DIASTOLIC BLOOD PRESSURE: 80 MMHG | SYSTOLIC BLOOD PRESSURE: 160 MMHG | BODY MASS INDEX: 20.37 KG/M2 | OXYGEN SATURATION: 99 %

## 2021-05-03 DIAGNOSIS — I10 ESSENTIAL HYPERTENSION: ICD-10-CM

## 2021-05-03 DIAGNOSIS — I42.9 CARDIOMYOPATHY, UNSPECIFIED TYPE (HCC): ICD-10-CM

## 2021-05-03 DIAGNOSIS — I35.0 AORTIC VALVE STENOSIS, ETIOLOGY OF CARDIAC VALVE DISEASE UNSPECIFIED: ICD-10-CM

## 2021-05-03 DIAGNOSIS — I25.10 CORONARY ARTERY DISEASE INVOLVING NATIVE CORONARY ARTERY OF NATIVE HEART WITHOUT ANGINA PECTORIS: Primary | ICD-10-CM

## 2021-05-03 PROCEDURE — 93000 ELECTROCARDIOGRAM COMPLETE: CPT | Performed by: INTERNAL MEDICINE

## 2021-05-03 PROCEDURE — 99214 OFFICE O/P EST MOD 30 MIN: CPT | Performed by: INTERNAL MEDICINE

## 2021-05-03 NOTE — PROGRESS NOTES
Date of Office Visit: 2021  Encounter Provider: Dr. Froy Mclain    Place of Service: [facility] [place]  Patient Name: Juan Miguel Chairez  :1960  Alex South Jr., MD    Chief Complaint   Patient presents with   • Coronary Artery Disease  Aortic stenosis     History of Present Illness    I am pleased to see Mr. Chairez in my office today for a follow-up     As you know, patient is 60 years old white gentleman whose past medical history is significant for hypertension, hyperlipidemia, diabetes mellitus, coronary artery disease, status post coronary artery bypass graft, cardiomyopathy, who came today for yearly  followup.     In , patient had cardiac workup for his symptom of chest pain.  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  Patient underwent cardiac catheterization which showed 100% lad stenosis, 25% LCX stenosis, RCA had 90% stenosis.  Patient underwent  left ventricle aneurysmectomy and CABG. In 2017, patient had echocardiogram which showed EF of 55% and akinetic distal anterior wall.  Mild aortic stenosis noted.    In 2021, patient underwent echocardiogram which showed EF of 50 to 55%.  Severe aortic stenosis noted with aortic valve area of 0.83 cm² and peak velocity of 3.5 m/s noted.  Peak gradient was 50 mmHg and mean gradient was 30.4 mmHg.    Patient came today to discuss his results of echocardiogram.  Patient is noted to have severe aortic stenosis on echocardiogram.  Patient is absolutely not having any symptom.  Patient walks for 90 minutes every day and does not have any shortness of breath.  Patient denies any symptom of chest pain or tightness or heaviness.  No orthopnea PND no syncope or presyncope.  No leg edema noted.    EKG showed sinus rhythm.  Nonspecific ST changes    Patient had severe aortic stenosis but there is no symptom.  I would continue to observe.  I would repeat echocardiogram in 6 to 9 months.   Patient is educated about the symptom of aortic stenosis..            Past Medical History:   Diagnosis Date   • Benign essential HTN    • CAD (coronary artery disease) of artery bypass graft    • Cardiomyopathy (CMS/HCC)    • Coronary artery disease    • Diabetes (CMS/HCC)    • Hyperlipidemia, mixed          Past Surgical History:   Procedure Laterality Date   • ABDOMINAL AORTIC ANEURYSM REPAIR     • CORONARY ARTERY BYPASS GRAFT  2010           Current Outpatient Medications:   •  Aspirin Low Dose 81 MG EC tablet, 1 tablet Daily., Disp: , Rfl:   •  levothyroxine (SYNTHROID, LEVOTHROID) 150 MCG tablet, 1 tablet Daily., Disp: , Rfl:   •  losartan (COZAAR) 100 MG tablet, 1 tablet Daily., Disp: , Rfl:   •  metoprolol tartrate (LOPRESSOR) 25 MG tablet, TAKE ONE TABLET BY MOUTH TWICE A DAY, Disp: 180 tablet, Rfl: 1  •  simvastatin (ZOCOR) 40 MG tablet, 1 tablet Daily., Disp: , Rfl:       Social History     Socioeconomic History   • Marital status:      Spouse name: Not on file   • Number of children: Not on file   • Years of education: Not on file   • Highest education level: Not on file   Tobacco Use   • Smoking status: Former Smoker   • Smokeless tobacco: Never Used   Vaping Use   • Vaping Use: Never used   Substance and Sexual Activity   • Alcohol use: Yes   • Drug use: Never   • Sexual activity: Defer         Review of Systems   Constitutional: Negative for chills and fever.   HENT: Negative for ear discharge and nosebleeds.    Eyes: Negative for discharge and redness.   Cardiovascular: Negative for chest pain, orthopnea, palpitations, paroxysmal nocturnal dyspnea and syncope.   Respiratory: Negative for cough, shortness of breath and wheezing.    Endocrine: Negative for heat intolerance.   Skin: Negative for rash.   Musculoskeletal: Negative for arthritis and myalgias.   Gastrointestinal: Negative for abdominal pain, melena, nausea and vomiting.   Genitourinary: Negative for dysuria and hematuria.  "  Neurological: Negative for dizziness, light-headedness, numbness and tremors.   Psychiatric/Behavioral: Negative for depression. The patient is not nervous/anxious.        Procedures      ECG 12 Lead    Date/Time: 5/3/2021 11:30 AM  Performed by: Froy Mclain MD  Authorized by: Froy Mclain MD   Comparison: compared with previous ECG   Similar to previous ECG  Rhythm: sinus rhythm  Other findings: non-specific ST-T wave changes    Clinical impression: normal ECG            ECG 12 Lead    (Results Pending)           Objective:    /80   Pulse 62   Ht 190.5 cm (75\")   Wt 74.3 kg (163 lb 12.8 oz)   SpO2 99%   BMI 20.47 kg/m²         Constitutional:       Appearance: Well-developed.   Eyes:      General: No scleral icterus.        Right eye: No discharge.   HENT:      Head: Normocephalic and atraumatic.   Neck:      Thyroid: No thyromegaly.      Lymphadenopathy: No cervical adenopathy.   Pulmonary:      Effort: Pulmonary effort is normal. No respiratory distress.      Breath sounds: Normal breath sounds. No wheezing. No rales.   Cardiovascular:      Normal rate. Regular rhythm.      Murmurs: There is a grade 4/6 systolic murmur.      No gallop.   Abdominal:      Tenderness: There is no abdominal tenderness.   Skin:     Findings: No erythema or rash.   Neurological:      Mental Status: Alert and oriented to person, place, and time.             Assessment:       Diagnosis Plan   1. Coronary artery disease involving native coronary artery of native heart without angina pectoris  ECG 12 Lead   2. Essential hypertension  ECG 12 Lead   3. Cardiomyopathy, unspecified type (CMS/HCC)  ECG 12 Lead   4. ST elevation myocardial infarction (STEMI), unspecified artery (CMS/HCC)  ECG 12 Lead   5. Aortic valve stenosis, etiology of cardiac valve disease unspecified  ECG 12 Lead            Plan:       MDM:    1.  Aortic stenosis:    Patient has severe aortic stenosis.  However patient is totally asymptomatic.  I would " recommend observation.  Repeat echocardiogram in 9 months    2.  CAD:    Patient is not having any symptom of angina pectoris.  Recommend observation.    3.  Cardiomyopathy/LV aneurysm:    Patient has history of LV aneurysm resection in the past.  Repeat echocardiogram showed EF of 50 to 55%.  Recommend observation    4.  Hypertension:    I would continue current treatment.  Blood pressure is well controlled at home.  He has whitecoat hypertension

## 2021-05-13 NOTE — PROGRESS NOTES
Progress Note      Patient Name: Juan Miguel Chairez   Patient ID: [id number]   Sex: Male   YOB: 1960    Primary Care Provider: Alex South MD   Referring Provider: Riley Fan MD    Visit Date: June 30, 2020    Provider: Alex South MD   Location: [facility]   Location Address: [address] [place] [address], [address]  [place], [place]  [id number]   Location Phone: [phone]          Chief Complaint  · Follow Up HTN      History Of Present Illness  Juan Miguel Chairez is a 60 year old /White male who presents for evaluation and treatment of:      HTN- pt denies dizziness, HAs, CP  HLD- doing well on vytorin  DM2- pt with continued weight loss through diet and exercise.   hypothyroid- due for recheck.       Past Medical History  Disease Name Date Onset Notes   Diabetes mellitus, type 2 --  --    Hyperlipidemia --  --    Hypertension --  --    Hypothyroidism --  --    Lymphoma, Malignant 1984 Hodgkin's         Past Surgical History  Procedure Name Date Notes   CABG --  --    Colonoscopy 2013 [facility]   heart surgery 2011 aortic Aneurysm Repair and double bypass   Splenectomy 1983  --          Medication List  Name Date Started Instructions   Adult Low Dose Aspirin 81 mg oral tablet,delayed release (DR/EC) 04/30/2020 TAKE ONE TABLET BY MOUTH DAILY   levothyroxine 150 mcg oral tablet 05/28/2020 TAKE ONE TABLET BY MOUTH DAILY   losartan 100 mg oral tablet 01/27/2020 TAKE ONE TABLET BY MOUTH DAILY   metoprolol tartrate 25 mg oral tablet 01/08/2020 TAKE ONE TABLET BY MOUTH TWICE A DAY   ProAir HFA 90 mcg/actuation inhalation HFA aerosol inhaler 09/25/2019 inhale 1 - 2 puffs (90 - 180 mcg) by inhalation route every 4-6 hours as needed   Vytorin 10-40 10-40 mg oral tablet 01/27/2020 TAKE ONE TABLET BY MOUTH DAILY         Allergy List  Allergen Name Date Reaction Notes   NO KNOWN DRUG ALLERGIES --  --  --        Allergies Reconciled  Family Medical History  Disease Name Relative/Age Notes  "  Stroke Father/   --    - No Family History of Colorectal Cancer  --          Social History  Finding Status Start/Stop Quantity Notes   Alcohol Current some day --/-- occasional --    Tobacco Former --/-- less than 1 ppd quit 2010         Immunizations  NameDate Admin Mfg Trade Name Lot Number Route Inj VIS Given VIS Publication   Hepatitis A02/24/2020 [facility] HAVRIX-ADULT [id number] IM RD 02/24/2020    Comments: Pt tolerated well and left office in stable condition   Hepatitis A08/02/2019 [facility] HAVRIX-ADULT [id number] IM RD 08/02/2019    Comments: Pt tolerated well and left office in stable condition   Meningococcal (MNG)08/02/2019 [facility]C MENACTRA [id number] IM LD 08/02/2019    Comments: Pt tolerated well and left office in stable condition         Review of Systems  · Constitutional  o Denies  o : fever, fatigue, weight loss, weight gain  · HENT  o Denies  o : headaches, lightheadedness  · Cardiovascular  o Denies  o : lower extremity edema, chest pressure, palpitations  · Respiratory  o Denies  o : shortness of breath, wheezing, frequent cough, dyspnea on exertion  · Gastrointestinal  o Denies  o : nausea, vomiting, diarrhea, constipation, abdominal pain      Vitals  Date Time BP Position Site L\R Cuff Size HR RR TEMP (F) WT  HT  BMI kg/m2 BSA m2 O2 Sat HC       09/27/2019 10:45 /78 Sitting    82 - R  97.5 200lbs 0oz 5'  8\" 30.41 2.09 97 %    02/24/2020 11:18 /72 Sitting    68 - R 16 96.8 178lbs 4oz 5'  8\" 27.1 1.97 98 %    06/30/2020 10:57 /70 Sitting    62 - R  96.4 166lbs 8oz 5'  8\" 25.32 1.9 100 %          Physical Examination  · Constitutional  o Appearance  o : no acute distress, well-nourished  · Head and Face  o Head  o :   § Inspection  § : atraumatic, normocephalic  · Eyes  o Eyes  o : extraocular movements intact, no scleral icterus, no conjunctival injection  · Ears, Nose, Mouth and Throat  o Ears  o :   § External Ears  § : normal  o Nose  o :   § Intranasal Exam  § : nares patent  o Oral Cavity  o : "   § Oral Mucosa  § : moist mucous membranes  · Respiratory  o Respiratory Effort  o : breathing comfortably, symmetric chest rise  o Auscultation of Lungs  o : clear to asculatation bilaterally, no wheezes, rales, or rhonchii  · Cardiovascular  o Heart  o :   § Auscultation of Heart  § : regular rate and rhythm, 3/6 KARISSA heard best at LUSB. no rubs, or gallops  o Peripheral Vascular System  o :   § Extremities  § : no edema  · Neurologic  o Mental Status Examination  o :   § Orientation  § : grossly oriented to person, place and time  o Gait and Station  o :   § Gait Screening  § : normal gait  · Psychiatric  o General  o : normal mood and affect          Assessment  · Hypertension     401.9/I10  well controlled. concerned with possibly going to low with weight loss. will cont monitoring. educated to call or RTC with any lightheadedness, dizziness.   · Diabetes mellitus, type 2     250.00/E11.9  check labs. well controlled based on last HgbA1c.   · Hyperlipidemia     272.4/E78.5  cont vytorin. check labs.   · Hypothyroidism     244.9/E03.9  check TSH and adjust synthroid accordingly.     Problems Reconciled  Plan  · Orders  o Diabetes 1 Panel (CMP, Lipid, A1c) [facility] (43416, 34970, 39619) - 250.00/E11.9 - 06/30/2020  o CBC with Auto Diff [facility] (17287) - 250.00/E11.9 - 06/30/2020  o ACO-41: Dilated Diabetic eye exam completed this year and results in chart/reviewed (2022F) - 250.00/E11.9 - 06/30/2020  o Thyroid Profile (78422, THYII, 09766) - 244.9/E03.9 - 06/30/2020  o ACO-39: Current medications updated and reviewed () - - 06/30/2020  o ACO-15: Pneumococcal Vaccine Administered or Previously Received (4040F) - - 06/30/2020  o ACO-19: Colorectal cancer screening results documented and reviewed (3017F) - - 06/30/2020  · Medications  o Medications have been Reconciled  o Transition of Care or Provider Policy  · Instructions  o Patient was educated/instructed on their diagnosis, treatment and medications prior to  discharge from the clinic today.  · Disposition  o f/u in 6 months  o labs done in clinic            Electronically Signed by: Alex Sotuh MD -Author on June 30, 2020 11:34:33 AM

## 2021-05-13 NOTE — PROGRESS NOTES
Progress Note      Patient Name: Juan Miguel Chairez   Patient ID: [id number]   Sex: Male   YOB: 1960    Primary Care Provider: Alex South MD   Referring Provider: Riley Fan MD    Visit Date: November 24, 2020    Provider: Alex South MD   Location: [facility]   Location Address: [address] [place] [address] 3  [place], [place]  [id number]   Location Phone: [phone]          Chief Complaint  · Follow up office visit within 7 calendar days of discharge from inpatient status (high complexity).      History Of Present Illness  FOLLOW UP OFFICE VISIT WITHIN 7 CALENDAR DAYS OF INPATIENT STATUS (SEVERE COMPLEXITY)  Juan Miguel Chairez presents to office for follow up post discharge from inpatient status within 7 calendar days. Patient was contacted within 2 business days via phone conversation. Documentation of that phone contact is present in the patient's electronic chart. Patient was admitted to an inpatient faciliity on 11/12/2020 and discharged on 11/17/2020 due to: shortness of breath   Admitting MD: Mehul Johnson   His discharge summary has been reviewed and placed in the patient's electronic chart.   Patient's problem list is: Diabetes mellitus, type 2, Hyperlipidemia, Hypertension, and Hypothyroidism   Patient's outpatient medication list has been reconciled with the medication list from the discharge summary and has been reviewed with the patient. Current medication list is: aspirin 81 mg oral tablet,delayed release (DR/EC), calcium polycarbophil 625 mg oral tablet, Colace 100 mg oral capsule, Gummy Multivitamin 1 gummy oral, levothyroxine 150 mcg oral tablet, losartan 100 mg oral tablet, metoprolol tartrate 25 mg oral tablet, Miralax 17 gram oral powder in packet, and simvastatin 40 mg oral tablet   Juan Miguel Chairez is a 60 year old /White male who presents for evaluation and treatment of:      pt admitted for SBO requiring NGT placement. pt did not have  surgical correction. pt reports feeling better with good po intake and normal BMs.   HTN- pt thinks BP is a little higher due to be stress about making appointment.   HLD- doing well on statin.          Past Medical History  Disease Name Date Onset Notes   Diabetes mellitus, type 2 --  --    Hyperlipidemia --  --    Hypertension --  --    Hypothyroidism --  --    Lymphoma, Malignant 1984 Hodgkin's         Past Surgical History  Procedure Name Date Notes   CABG --  --    Colonoscopy 2013 [facility]   heart surgery 2011 aortic Aneurysm Repair and double bypass   Splenectomy 1983  --          Medication List  Name Date Started Instructions   aspirin 81 mg oral tablet,delayed release (DR/EC) 07/27/2020 TAKE ONE TABLET BY MOUTH DAILY   calcium polycarbophil 625 mg oral tablet  take 1 tablet by oral route once a day (in the evening)   Colace 100 mg oral capsule  take 1 capsule (100 mg) by oral route once daily for 30 days   Gummy Multivitamin 1 gummy oral  take one gummy PO QDAY   levothyroxine 150 mcg oral tablet 11/24/2020 TAKE ONE TABLET BY MOUTH DAILY   losartan 100 mg oral tablet 01/27/2020 TAKE ONE TABLET BY MOUTH DAILY   metoprolol tartrate 25 mg oral tablet 01/08/2020 TAKE ONE TABLET BY MOUTH TWICE A DAY   simvastatin 40 mg oral tablet 09/29/2020 TAKE ONE TABLET BY MOUTH EVERY EVENING         Allergy List  Allergen Name Date Reaction Notes   NO KNOWN DRUG ALLERGIES --  --  --        Allergies Reconciled  Family Medical History  Disease Name Relative/Age Notes   Stroke Father/   --    - No Family History of Colorectal Cancer  --          Social History  Finding Status Start/Stop Quantity Notes   Alcohol Current some day --/-- occasional --    Tobacco Former --/-- less than 1 ppd quit 2010         Immunizations  NameDate Admin Mfg Trade Name Lot Number Route Inj VIS Given VIS Publication   Hepatitis A02/24/2020 SKB HAVRIX-ADULT [id number] IM RD 02/24/2020    Comments: Pt tolerated well and left office in stable condition  "  Hepatitis A08/02/2019 SKB HAVRIX-ADULT [id number] IM RD 08/02/2019    Comments: Pt tolerated well and left office in stable condition   Meningococcal (MNG)08/02/2019 PMC MENACTRA [id number] IM LD 08/02/2019    Comments: Pt tolerated well and left office in stable condition         Review of Systems  · Constitutional  o Denies  o : fever, weight gain, weight loss, fatigue  · Cardiovascular  o Denies  o : palpitation, chest Pain, lower extremity edema  · Respiratory  o Denies  o : shortness of breath, wheezing, dry cough, productive cough  · Gastrointestinal  o Denies  o : nausea, vomiting, diarrhea, constipation, abdominal pain  · Neurologic  o Denies  o : unsteady gait, weakness, dizziness, H/A      Vitals  Date Time BP Position Site L\R Cuff Size HR RR TEMP (F) WT  HT  BMI kg/m2 BSA m2 O2 Sat FR L/min FiO2 HC       02/24/2020 11:18 /72 Sitting    68 - R 16 96.8 178lbs 4oz 5'  8\" 27.1 1.97 98 %  21%    06/30/2020 10:57 /70 Sitting    62 - R  96.4 166lbs 8oz 5'  8\" 25.32 1.9 100 %      11/24/2020 12:29 /88 Sitting    75 - R  97.8 163lbs 4oz 5'  8\" 24.82 1.88 99 %            Physical Examination  · Constitutional  o Appearance  o : no acute distress, well-nourished  · Head and Face  o Head  o :   § Inspection  § : atraumatic, normocephalic  · Eyes  o Eyes  o : extraocular movements intact, no scleral icterus, no conjunctival injection  · Ears, Nose, Mouth and Throat  o Ears  o :   § External Ears  § : normal  o Nose  o :   § Intranasal Exam  § : nares patent  o Oral Cavity  o :   § Oral Mucosa  § : moist mucous membranes  · Respiratory  o Respiratory Effort  o : breathing comfortably, symmetric chest rise  o Auscultation of Lungs  o : clear to asculatation bilaterally, no wheezes, rales, or rhonchii  · Cardiovascular  o Heart  o :   § Auscultation of Heart  § : regular rate and rhythm, no murmurs, rubs, or gallops  o Peripheral Vascular System  o :   § Extremities  § : no edema  · Neurologic  o Mental " Status Examination  o :   § Orientation  § : grossly oriented to person, place and time  o Gait and Station  o :   § Gait Screening  § : normal gait  · Psychiatric  o General  o : normal mood and affect          Assessment  · Hypertension     401.9/I10  elevated today, but thought 2/2 anxiety over making appt. cont monitoring.   · Hyperlipidemia     272.4/E78.5  cont statin  · Small bowel obstruction     560.9/K56.609  doing well. may trial off Colace. if constipation ensues, may restart.     Problems Reconciled  Plan  · Orders  o Discharge medications reconciled with the current medication list (1111F) - - 11/24/2020  o ACO-39: Current medications updated and reviewed (, 1159F) - - 11/24/2020  · Medications  o Colace 100 mg oral capsule   SIG: take 1 capsule (100 mg) by oral route once daily for 30 days   DISP: (30) Capsule with 1 refills  Discontinued on 11/24/2020     o Medications have been Reconciled  o Transition of Care or Provider Policy  · Instructions  o Advised that cheeses and other sources of dairy fats, animal fats, fast food, and the extras (candy, pastries, pies, doughnuts and cookies) all contain LDL raising nutrients. Advised to increase fruits, vegetables, whole grains, and to monitor portion sizes.   o Patient discharge summary has been reviewed and placed in patient's electronic medical record.  o Patient received a phone call from my office within 2 business days of discharge from inpatient status, and documented within the patient's chart.  o Also patient was seen (face to face) for follow up evaluation within 7 calendar days of discharge from inpatient status for a high complexity issue.  o Patient was educated on their diagnosis, treatment and any medication changes while being evaluated today.  o Patient was educated/instructed on their diagnosis, treatment and medications prior to discharge from the clinic today.  · Disposition  o Call or Return if symptoms worsen or  persist.            Electronically Signed by: Alex South MD -Author on November 24, 2020 01:11:58 PM

## 2021-05-13 NOTE — PROGRESS NOTES
Progress Note      Patient Name: Juan Miguel Chairez   Patient ID: [id number]   Sex: Male   YOB: 1960    Primary Care Provider: Alex South MD   Referring Provider: Riley Fan MD    Visit Date: November 24, 2020    Provider: Max Bartholomew MD   Location: [facility]   Location Address: [address] [place] [address]  [id number]   Location Phone: [phone]          Chief Complaint  · PSBO      History Of Present Illness     Hrajinder came back for follow-up. He is a very nice gentleman who is the  here in [facility]. He recently was in the hospital with a partial small bowel obstruction that fortunately resolved without surgery. He has had a lap band done in the past. He told me that he went home and for a few days didn't eat a whole lot but he was able to eat some solid food over the last few days and he is definitely doing better. His bowels are working well. He is not having any abdominal pain or any difficulty keeping food down.       Past Medical History  Diabetes mellitus, type 2; Hyperlipidemia; Hypertension; Hypothyroidism; Lymphoma, Malignant         Past Surgical History  CABG; Colonoscopy; heart surgery; Splenectomy         Medication List  aspirin 81 mg oral tablet,delayed release (DR/EC); calcium polycarbophil 625 mg oral tablet; Gummy Multivitamin 1 gummy oral; levothyroxine 150 mcg oral tablet; losartan 100 mg oral tablet; metoprolol tartrate 25 mg oral tablet; simvastatin 40 mg oral tablet         Allergy List  NO KNOWN DRUG ALLERGIES         Family Medical History  Stroke; - No Family History of Colorectal Cancer         Social History  Alcohol (Current some day); Tobacco (Former)         Immunizations  Name Date Admin   Hepatitis A 02/24/2020   Hepatitis A 08/02/2019   Meningococcal (MNG) 08/02/2019         Vitals  Date Time BP Position Site L\R Cuff Size HR RR TEMP (F) WT  HT  BMI kg/m2 BSA m2 O2 Sat FR L/min FiO2 HC       11/24/2020 12:29 /88  "Sitting    75 - R  97.8 163lbs 4oz 5'  8\" 24.82 1.88 99 %      11/24/2020 02:13 PM       16  162lbs 0oz 5'  8\" 24.63 1.88             Physical Examination     Today on physical exam, he appears well today. His abdomen is soft.           Assessment  · Partial small bowel obstruction     560.9/K56.600       Partial small bowel obstruction. He had a recent admission but is doing much better now.       Plan  · Medications  o Medications have been Reconciled  o Transition of Care or Provider Policy     I have asked Harjinder to call us back if he has any further trouble. He indicated that he would. We will otherwise see him back on an as needed basis.             Electronically Signed by: Dali Lunsford-, -Author on November 24, 2020 03:59:49 PM  Electronically Co-signed by: Max Bartholomew MD -Reviewer on November 30, 2020 09:01:55 AM  "

## 2021-05-14 VITALS
OXYGEN SATURATION: 99 % | WEIGHT: 163.25 LBS | DIASTOLIC BLOOD PRESSURE: 88 MMHG | TEMPERATURE: 97.8 F | BODY MASS INDEX: 24.74 KG/M2 | HEIGHT: 68 IN | SYSTOLIC BLOOD PRESSURE: 148 MMHG | HEART RATE: 75 BPM

## 2021-05-14 VITALS
DIASTOLIC BLOOD PRESSURE: 72 MMHG | OXYGEN SATURATION: 100 % | HEIGHT: 68 IN | SYSTOLIC BLOOD PRESSURE: 142 MMHG | WEIGHT: 161 LBS | TEMPERATURE: 97.6 F | BODY MASS INDEX: 24.4 KG/M2 | HEART RATE: 65 BPM

## 2021-05-14 VITALS — RESPIRATION RATE: 16 BRPM | BODY MASS INDEX: 24.55 KG/M2 | HEIGHT: 68 IN | WEIGHT: 162 LBS

## 2021-05-14 NOTE — PROGRESS NOTES
Progress Note      Patient Name: Juan Miguel Chairez   Patient ID: [id number]   Sex: Male   YOB: 1960    Primary Care Provider: Alex South MD   Referring Provider: Riley Fan MD    Visit Date: January 4, 2021    Provider: Alex South MD   [facility]   Location Address: [address] [place] [address]  [id number]   Location Phone: [phone]          Chief Complaint  · Follow up, no concerns      History Of Present Illness  Juan Miguel Chairez is a 60 year old /White male who presents for evaluation and treatment of:      impaired fasting glucose- pt cont losing weight gradually.   HLD- doing well on statin.  HTN- pt denies HAs, dizziness, CP. pt cont to exercise regularly.   hypothyroid- due for recheck  aortic stenosis - moderate to severe. pt setup for JOSE ANTONIO in spring 2021. cont f/u with cardiology.       Past Medical History  Disease Name Date Onset Notes   Diabetes mellitus, type 2 --  --    Hyperlipidemia --  --    Hypertension --  --    Hypothyroidism --  --    Lymphoma, Malignant 1984 Hodgkin's         Past Surgical History  Procedure Name Date Notes   CABG --  --    Colonoscopy 2013 [facility]   heart surgery 2011 aortic Aneurysm Repair and double bypass   Splenectomy 1983  --          Medication List  Name Date Started Instructions   aspirin 81 mg oral tablet,delayed release (DR/EC) 07/27/2020 TAKE ONE TABLET BY MOUTH DAILY   Gummy Multivitamin 1 gummy oral  take one gummy PO QDAY   levothyroxine 150 mcg oral tablet 11/24/2020 TAKE ONE TABLET BY MOUTH DAILY   losartan 100 mg oral tablet 01/27/2020 TAKE ONE TABLET BY MOUTH DAILY   metoprolol tartrate 25 mg oral tablet 01/08/2020 TAKE ONE TABLET BY MOUTH TWICE A DAY   simvastatin 40 mg oral tablet 09/29/2020 TAKE ONE TABLET BY MOUTH EVERY EVENING         Allergy List  Allergen Name Date Reaction Notes   NO KNOWN DRUG ALLERGIES --  --  --        Allergies Reconciled  Family Medical  "History  Disease Name Relative/Age Notes   Stroke Father/   --    - No Family History of Colorectal Cancer  --          Social History  Finding Status Start/Stop Quantity Notes   Alcohol Current some day --/-- occasional --    Tobacco Former --/-- less than 1 ppd quit 2010         Immunizations  NameDate Admin Mfg Trade Name Lot Number Route Inj VIS Given VIS Publication   Hepatitis A02/24/2020 SKB HAVRIX-ADULT [id number] IM RD 02/24/2020    Comments: Pt tolerated well and left office in stable condition   Hepatitis A08/02/2019 SKB HAVRIX-ADULT [id number] IM RD 08/02/2019    Comments: Pt tolerated well and left office in stable condition   Meningococcal (MNG)08/02/2019 PMC MENACTRA [id number] IM LD 08/02/2019    Comments: Pt tolerated well and left office in stable condition         Review of Systems  · Constitutional  o Admits  o : weight loss  o Denies  o : fever, fatigue, weight gain  · Cardiovascular  o Denies  o : lower extremity edema, chest pressure, palpitations  · Respiratory  o Denies  o : shortness of breath, wheezing, cough, dyspnea on exertion  · Gastrointestinal  o Denies  o : nausea, vomiting, diarrhea, constipation, abdominal pain      Vitals  Date Time BP Position Site L\R Cuff Size HR RR TEMP (F) WT  HT  BMI kg/m2 BSA m2 O2 Sat FR L/min FiO2 HC       11/24/2020 12:29 /88 Sitting    75 - R  97.8 163lbs 4oz 5'  8\" 24.82 1.88 99 %      11/24/2020 02:13 PM       16  162lbs 0oz 5'  8\" 24.63 1.88       01/04/2021 11:16 /72 Sitting    65 - R  97.6 161lbs 0oz 5'  8\" 24.48 1.87 100 %  21%          Physical Examination  · Constitutional  o Appearance  o : no acute distress, well-nourished  · Head and Face  o Head  o :   § Inspection  § : atraumatic, normocephalic  · Eyes  o Eyes  o : extraocular movements intact, no scleral icterus, no conjunctival injection  · Ears, Nose, Mouth and Throat  o Ears  o :   § External Ears  § : normal  o Nose  o :   § Intranasal Exam  § : nares patent  o Oral Cavity  o : "   § Oral Mucosa  § : moist mucous membranes  · Respiratory  o Respiratory Effort  o : breathing comfortably, symmetric chest rise  o Auscultation of Lungs  o : clear to asculatation bilaterally, no wheezes, rales, or rhonchii  · Cardiovascular  o Heart  o :   § Auscultation of Heart  § : regular rate and rhythm, 2/6 KARISSA murmur. No rubs or gallops  o Peripheral Vascular System  o :   § Extremities  § : no edema  · Neurologic  o Mental Status Examination  o :   § Orientation  § : grossly oriented to person, place and time  o Gait and Station  o :   § Gait Screening  § : normal gait  · Psychiatric  o General  o : normal mood and affect          Assessment  · Screening for prostate cancer     V76.44/Z12.5  · Hyperlipidemia     272.4/E78.5  cont statin. check labs.   · Hypothyroidism     244.9/E03.9  check TSH and adjust accordingly  · Impaired fasting glucose     790.21/R73.01  check HgbA1c  · Hypertension     401.9/I10  well controlled on regimen  · Aortic valve stenosis     424.1/I35.0  cont serial monitoring. JOSE ANTONIO in spring 2021. f/u with cardiology.     Problems Reconciled  Plan  · Orders  o Hgb A1c [facility] (90542) - 790.21/R73.01 - 01/04/2021  o Male Physical Primary Care Panel (CMP, CBC, TSH, Lipid, PSA) [facility] (, 24830, [address]159, 57932, 35398, 35565) - 401.9/I10, 272.4/E78.5, 244.9/E03.9, V76.44/Z12.5 - 01/04/2021  o ACO-15: Pneumococcal Vaccine Administered or Previously Received [facility] (4040F) - - 01/04/2021  o ACO-14: Influenza immunization administered or previously received [facility] () - - 01/04/2021  o ACO-19: Colorectal cancer screening results documented and reviewed (3017F) - - 01/04/2021  o ACO-39: Current medications updated and reviewed (1159F, ) - - 01/04/2021  · Medications  o Medications have been Reconciled  o Transition of Care or Provider Policy  · Instructions  o Patient was educated/instructed on their diagnosis, treatment and medications prior to discharge from the clinic  today.  · Disposition  o f/u in 6 months            Electronically Signed by: Alex South MD -Author on January 4, 2021 12:12:01 PM

## 2021-05-15 VITALS
WEIGHT: 205 LBS | HEART RATE: 75 BPM | HEIGHT: 68 IN | OXYGEN SATURATION: 100 % | SYSTOLIC BLOOD PRESSURE: 128 MMHG | BODY MASS INDEX: 31.07 KG/M2 | DIASTOLIC BLOOD PRESSURE: 84 MMHG | TEMPERATURE: 97.4 F

## 2021-05-15 VITALS
BODY MASS INDEX: 31.07 KG/M2 | DIASTOLIC BLOOD PRESSURE: 74 MMHG | HEART RATE: 86 BPM | HEIGHT: 68 IN | SYSTOLIC BLOOD PRESSURE: 122 MMHG | TEMPERATURE: 97.6 F | WEIGHT: 205 LBS | OXYGEN SATURATION: 99 %

## 2021-05-15 VITALS
RESPIRATION RATE: 16 BRPM | HEART RATE: 68 BPM | WEIGHT: 178.25 LBS | HEIGHT: 68 IN | TEMPERATURE: 96.8 F | DIASTOLIC BLOOD PRESSURE: 72 MMHG | OXYGEN SATURATION: 98 % | BODY MASS INDEX: 27.01 KG/M2 | SYSTOLIC BLOOD PRESSURE: 120 MMHG

## 2021-05-15 VITALS
BODY MASS INDEX: 30.78 KG/M2 | TEMPERATURE: 98.4 F | WEIGHT: 203.12 LBS | HEART RATE: 94 BPM | HEIGHT: 68 IN | OXYGEN SATURATION: 97 % | DIASTOLIC BLOOD PRESSURE: 78 MMHG | SYSTOLIC BLOOD PRESSURE: 124 MMHG | RESPIRATION RATE: 15 BRPM

## 2021-05-15 VITALS
WEIGHT: 200 LBS | BODY MASS INDEX: 30.31 KG/M2 | TEMPERATURE: 97.5 F | OXYGEN SATURATION: 97 % | SYSTOLIC BLOOD PRESSURE: 117 MMHG | DIASTOLIC BLOOD PRESSURE: 78 MMHG | HEART RATE: 82 BPM | HEIGHT: 68 IN

## 2021-05-15 VITALS
BODY MASS INDEX: 25.23 KG/M2 | OXYGEN SATURATION: 100 % | WEIGHT: 166.5 LBS | HEART RATE: 62 BPM | HEIGHT: 68 IN | TEMPERATURE: 96.4 F | DIASTOLIC BLOOD PRESSURE: 70 MMHG | SYSTOLIC BLOOD PRESSURE: 117 MMHG

## 2021-05-15 VITALS — BODY MASS INDEX: 31.07 KG/M2 | HEIGHT: 68 IN | RESPIRATION RATE: 16 BRPM | WEIGHT: 205 LBS

## 2021-05-24 ENCOUNTER — CONVERSION ENCOUNTER (OUTPATIENT)
Dept: SURGERY | Facility: CLINIC | Age: 61
End: 2021-05-24

## 2021-05-24 ENCOUNTER — OFFICE VISIT CONVERTED (OUTPATIENT)
Dept: UROLOGY | Facility: CLINIC | Age: 61
End: 2021-05-24
Attending: UROLOGY

## 2021-05-24 LAB
BILIRUB UR QL STRIP: NEGATIVE
COLOR UR: YELLOW
CONV CLARITY OF URINE: CLEAR
CONV PROTEIN IN URINE BY AUTOMATED TEST STRIP: NEGATIVE
CONV UROBILINOGEN IN URINE BY AUTOMATED TEST STRIP: NORMAL
GLUCOSE UR QL: NEGATIVE
HGB UR QL STRIP: NEGATIVE
KETONES UR QL STRIP: NEGATIVE
LEUKOCYTE ESTERASE UR QL STRIP: NEGATIVE
NITRITE UR QL STRIP: NEGATIVE
PH UR STRIP.AUTO: 6 [PH]
SP GR UR: 1.02

## 2021-06-05 NOTE — PROGRESS NOTES
"   Progress Note      Patient Name: Juan Miguel Chairez   Patient ID: [id number]   Sex: Male   YOB: 1960    Primary Care Provider: Alex South MD   Referring Provider: Riley Fan MD    Visit Date: May 24, 2021    Provider: Anthony Lopes MD   Location: [facility]   Location Address: [address] [place] [address]  [id number]   Location Phone: [phone]          Chief Complaint  · urological issues      History Of Present Illness     62      PSA    4/21  2.63  9/19  2.02        \">61 yo male. He is the [facility]. h/o abnormal KULWINDER at [facility].     Patient is voiding without issue, no history of prostate meds.    He has never had a prostate biopsy.    no gross hematuria, dysuria or recurrent urinary tract infections.  No history of kidney stone.    No urologic family history,   Has never had any urologic surgery.    No cardiopulmonary history.  Patient does not smoke.  Patient does not use blood thinner.    1/4/2021 creatinine 0.5, > 60      PSA    4/21  2.63  9/19  2.02               Past Medical History  Diabetes mellitus, type 2; Hyperlipidemia; Hypertension; Hypothyroidism; Lymphoma, Malignant         Past Surgical History  CABG; Colonoscopy; heart surgery; Splenectomy         Medication List  aspirin 81 mg oral tablet,delayed release (DR/EC); Gummy Multivitamin 1 gummy oral; levothyroxine 150 mcg oral tablet; losartan 100 mg oral tablet; metoprolol tartrate 25 mg oral tablet; simvastatin 40 mg oral tablet         Allergy List  NO KNOWN DRUG ALLERGIES       Allergies Reconciled  Family Medical History  Stroke; - No Family History of Colorectal Cancer         Social History  Alcohol (Current some day); Tobacco (Former)         Immunizations  Name Date Admin   Hepatitis A 02/24/2020   Hepatitis A 08/02/2019   Meningococcal (MNG) 08/02/2019         Review of Systems  · Constitutional  o Denies  o : fatigue, night sweats  · Eyes  o Denies  o : double vision, " "blurred vision  · HENT  o Denies  o : vertigo, recent head injury  · Breasts  o Denies  o : abnormal changes in breast size, additional breast symptoms except as noted in the HPI  · Cardiovascular  o Denies  o : chest pain, irregular heart beats  · Respiratory  o Denies  o : shortness of breath, productive cough  · Gastrointestinal  o Denies  o : nausea, vomiting  · Genitourinary  o Admits  o : nocturia  o Denies  o : urgency, frequency, dysuria, hematuria, incontinence, urinary retention, difficulty voiding, decreased stream, post-void dribbling, Flank pain  · Integument  o Denies  o : hair growth change, new skin lesions  · Neurologic  o Denies  o : altered mental status, seizures  · Musculoskeletal  o Denies  o : joint swelling, limitation of motion  · Endocrine  o Denies  o : cold intolerance, heat intolerance  · Heme-Lymph  o Denies  o : petechiae, lymph node enlargement or tenderness  · Allergic-Immunologic  o Denies  o : frequent illnesses      Vitals  Date Time BP Position Site L\R Cuff Size HR RR TEMP (F) WT  HT  BMI kg/m2 BSA m2 O2 Sat FR L/min FiO2        05/24/2021 09:50 AM       13  164lbs 3oz 5'  8\" 24.96 1.89             Physical Examination  · Constitutional  o Appearance  o : Well-appearing, well-developed, in no acute distress  · Respiratory  o Respiratory Effort  o : Unlabored breathing  · Neurologic  o Mental Status Examination  o :   § Orientation  § : Grossly oriented to person, place and time, judgment and insight intact, normal mood and affect       KULWINDER-gram prostate, a little bit asymmetry but no signs of mass or abnormality.  Nontender.           Results  · In-Office Procedures  o Lab procedure  § Automated Dipstick Urinalysis (Surg Spec) WITHOUT Micro [facility] (70665)   § Color Ur: Yellow   § Clarity Ur: Clear   § Glucose Ur Ql Strip: Negative   § Bilirub Ur Ql Strip: Negative   § Ketones Ur Ql Strip: Negative   § Sp Gr Ur Qn: 1.020   § Hgb Ur Ql Strip: Negative   § pH Ur-LsCnc: 6.0   § Prot " Ur Ql Strip: Negative   § Urobilinogen Ur Strip-mCnc: 0.2 E.U./dL   § Nitrite Ur Ql Strip: Negative   § WBC Est Ur Ql Strip: Negative       Assessment  · Prostate cancer screening     V76.44/Z12.5      Plan  · Medications  o Medications have been Reconciled  o Transition of Care or Provider Policy  · Instructions  o Electronically Identified Patient Education Materials Provided Electronically       PSA within normal limits for his age, reviewed today.  KULWINDER normal without issue.  Patient given reassurance    Continue his regular prostate cancer screening through his PCP    Follow-up as needed.             Electronically Signed by: Anthony Lopes MD -Author on May 24, 2021 10:17:15 AM

## 2021-06-07 NOTE — TELEPHONE ENCOUNTER
Caller: Juan Miguel Chairez    Relationship: Self    Best call back number: [phone]    Medication needed:   Requested Prescriptions     Pending Prescriptions Disp Refills   • levothyroxine (SYNTHROID, LEVOTHROID) 150 MCG tablet       Si tablet Daily.       When do you need the refill by: 21    What additional details did the patient provide when requesting the medication: PATIENT NEEDS A 90 DAY SUPPLY    Does the patient have less than a 3 day supply:  [x] Yes  [] No    What is the patient's preferred pharmacy: [facility] [address] AT [facility] [address]  [phone] [facility] [phone] FX

## 2021-06-08 RX ORDER — LEVOTHYROXINE SODIUM 0.15 MG/1
150 TABLET ORAL DAILY
Qty: 90 TABLET | Refills: 0 | Status: SHIPPED | OUTPATIENT
Start: 2021-06-08 | End: 2021-08-17 | Stop reason: SDUPTHER

## 2021-07-15 VITALS — WEIGHT: 164.19 LBS | HEIGHT: 68 IN | RESPIRATION RATE: 13 BRPM | BODY MASS INDEX: 24.89 KG/M2

## 2021-08-02 DIAGNOSIS — I10 ESSENTIAL HYPERTENSION: Primary | ICD-10-CM

## 2021-08-02 NOTE — TELEPHONE ENCOUNTER
Caller: Juan Miguel Chairez    Relationship: Self    Best call back number: [phone]     Medication needed:   Requested Prescriptions     Pending Prescriptions Disp Refills   • losartan (COZAAR) 100 MG tablet       Si tablet Daily.       When do you need the refill by: ASAP    What additional details did the patient provide when requesting the medication: PATIENT HAS BEEN OUT OF THIS MEDICATION FOR A WEEK.    Does the patient have less than a 3 day supply:  [x] Yes  [] No    What is the patient's preferred pharmacy:    [facility] [address] AT [facility] ([address] - [phone]  - [phone]   [phone]

## 2021-08-03 ENCOUNTER — TELEPHONE (OUTPATIENT)
Dept: INTERNAL MEDICINE | Facility: CLINIC | Age: 61
End: 2021-08-03

## 2021-08-03 NOTE — TELEPHONE ENCOUNTER
Caller: YAYAXIN JACQUE [place]  [place] - [address] AT [facility] [address] ([address] - [phone] [facility] [phone] FX    Relationship: Pharmacy    Best call back number: [phone]    What is the best time to reach you: ANY    Who are you requesting to speak with (clinical staff, provider,  specific staff member): CLINICAL    What was the call regarding: PHARMACIST HAS SENT A FAX REQUESTING A REFILL ON [address]. PHARMACIST SAYS YOU CAN EITHER SIGN AND FAX FORM BACK OR YOU CAN CALL HER AND GIVE HER A VERBAL AUTHORIZATION    Do you require a callback: PLEASE CALL

## 2021-08-04 RX ORDER — LOSARTAN POTASSIUM 100 MG/1
100 TABLET ORAL DAILY
Qty: 90 TABLET | Refills: 2 | Status: SHIPPED | OUTPATIENT
Start: 2021-08-04 | End: 2022-04-28

## 2021-08-17 ENCOUNTER — OFFICE VISIT (OUTPATIENT)
Dept: INTERNAL MEDICINE | Facility: CLINIC | Age: 61
End: 2021-08-17

## 2021-08-17 ENCOUNTER — TELEPHONE (OUTPATIENT)
Dept: INTERNAL MEDICINE | Facility: CLINIC | Age: 61
End: 2021-08-17

## 2021-08-17 VITALS
TEMPERATURE: 97.6 F | HEIGHT: 68 IN | WEIGHT: 169 LBS | OXYGEN SATURATION: 99 % | SYSTOLIC BLOOD PRESSURE: 147 MMHG | DIASTOLIC BLOOD PRESSURE: 82 MMHG | RESPIRATION RATE: 14 BRPM | BODY MASS INDEX: 25.61 KG/M2 | HEART RATE: 71 BPM

## 2021-08-17 DIAGNOSIS — E03.9 HYPOTHYROIDISM, UNSPECIFIED TYPE: ICD-10-CM

## 2021-08-17 DIAGNOSIS — I10 ESSENTIAL HYPERTENSION: Primary | ICD-10-CM

## 2021-08-17 DIAGNOSIS — Z90.81 H/O SPLENECTOMY: ICD-10-CM

## 2021-08-17 DIAGNOSIS — Z23 NEED FOR PNEUMOCOCCAL VACCINATION: ICD-10-CM

## 2021-08-17 DIAGNOSIS — E11.9 TYPE 2 DIABETES MELLITUS WITHOUT COMPLICATION, WITHOUT LONG-TERM CURRENT USE OF INSULIN (HCC): ICD-10-CM

## 2021-08-17 DIAGNOSIS — E78.2 MIXED HYPERLIPIDEMIA: ICD-10-CM

## 2021-08-17 LAB
ALBUMIN UR-MCNC: <1.2 MG/DL
BASOPHILS # BLD AUTO: 0.08 10*3/MM3 (ref 0–0.2)
BASOPHILS NFR BLD AUTO: 0.9 % (ref 0–1.5)
CHOLEST SERPL-MCNC: 158 MG/DL (ref 0–200)
DEPRECATED RDW RBC AUTO: 49.8 FL (ref 37–54)
EOSINOPHIL # BLD AUTO: 0.12 10*3/MM3 (ref 0–0.4)
EOSINOPHIL NFR BLD AUTO: 1.4 % (ref 0.3–6.2)
ERYTHROCYTE [DISTWIDTH] IN BLOOD BY AUTOMATED COUNT: 14.6 % (ref 12.3–15.4)
HBA1C MFR BLD: 5.35 % (ref 4.8–5.6)
HCT VFR BLD AUTO: 44.6 % (ref 37.5–51)
HDLC SERPL-MCNC: 61 MG/DL (ref 40–60)
HGB BLD-MCNC: 13.9 G/DL (ref 13–17.7)
IMM GRANULOCYTES # BLD AUTO: 0.02 10*3/MM3 (ref 0–0.05)
IMM GRANULOCYTES NFR BLD AUTO: 0.2 % (ref 0–0.5)
LDLC SERPL CALC-MCNC: 84 MG/DL (ref 0–100)
LDLC/HDLC SERPL: 1.38 {RATIO}
LYMPHOCYTES # BLD AUTO: 2.53 10*3/MM3 (ref 0.7–3.1)
LYMPHOCYTES NFR BLD AUTO: 29.2 % (ref 19.6–45.3)
MCH RBC QN AUTO: 28.6 PG (ref 26.6–33)
MCHC RBC AUTO-ENTMCNC: 31.2 G/DL (ref 31.5–35.7)
MCV RBC AUTO: 91.8 FL (ref 79–97)
MONOCYTES # BLD AUTO: 0.61 10*3/MM3 (ref 0.1–0.9)
MONOCYTES NFR BLD AUTO: 7 % (ref 5–12)
NEUTROPHILS NFR BLD AUTO: 5.3 10*3/MM3 (ref 1.7–7)
NEUTROPHILS NFR BLD AUTO: 61.3 % (ref 42.7–76)
NRBC BLD AUTO-RTO: 0 /100 WBC (ref 0–0.2)
PLATELET # BLD AUTO: 460 10*3/MM3 (ref 140–450)
PMV BLD AUTO: 11.2 FL (ref 6–12)
RBC # BLD AUTO: 4.86 10*6/MM3 (ref 4.14–5.8)
TRIGL SERPL-MCNC: 63 MG/DL (ref 0–150)
TSH SERPL DL<=0.05 MIU/L-ACNC: 1.27 UIU/ML (ref 0.27–4.2)
VLDLC SERPL-MCNC: 13 MG/DL (ref 5–40)
WBC # BLD AUTO: 8.66 10*3/MM3 (ref 3.4–10.8)

## 2021-08-17 PROCEDURE — 82043 UR ALBUMIN QUANTITATIVE: CPT | Performed by: INTERNAL MEDICINE

## 2021-08-17 PROCEDURE — 80061 LIPID PANEL: CPT | Performed by: INTERNAL MEDICINE

## 2021-08-17 PROCEDURE — 80053 COMPREHEN METABOLIC PANEL: CPT | Performed by: INTERNAL MEDICINE

## 2021-08-17 PROCEDURE — 90471 IMMUNIZATION ADMIN: CPT | Performed by: INTERNAL MEDICINE

## 2021-08-17 PROCEDURE — 84443 ASSAY THYROID STIM HORMONE: CPT | Performed by: INTERNAL MEDICINE

## 2021-08-17 PROCEDURE — 90732 PPSV23 VACC 2 YRS+ SUBQ/IM: CPT | Performed by: INTERNAL MEDICINE

## 2021-08-17 PROCEDURE — 83036 HEMOGLOBIN GLYCOSYLATED A1C: CPT | Performed by: INTERNAL MEDICINE

## 2021-08-17 PROCEDURE — 85025 COMPLETE CBC W/AUTO DIFF WBC: CPT | Performed by: INTERNAL MEDICINE

## 2021-08-17 PROCEDURE — 99214 OFFICE O/P EST MOD 30 MIN: CPT | Performed by: INTERNAL MEDICINE

## 2021-08-17 RX ORDER — LEVOTHYROXINE SODIUM 0.15 MG/1
150 TABLET ORAL DAILY
Qty: 90 TABLET | Refills: 1 | Status: SHIPPED | OUTPATIENT
Start: 2021-08-17 | End: 2022-02-02

## 2021-08-17 NOTE — PROGRESS NOTES
"Chief Complaint  Follow-up, Hyperlipidemia, Hypertension, and Hypothyroidism    Subjective          Juan Miguel Chairez presents to [facility]  History of Present Illness  Severe AS- f/u with cardiology for serial echo. Pt is walking regularly without issue. Pt denies SOB and ROMERO.   HTN- pt reports stressful in the am and has elevated BP at physicians offices. Pt reports home BP readings typically in normal range with 100s/60s.   HLD- due for recheck  Hypothyroid- pt request refill of ysnthroid    Objective   Vital Signs:   /82   Pulse 71   Temp 97.6 °F (36.4 °C) (Temporal)   Resp 14   Ht 172.7 cm (68\")   Wt 76.7 kg (169 lb)   SpO2 99%   BMI 25.70 kg/m²     Physical Exam   Appearance: No acute distress, well-nourished  Head: normocephalic, atraumatic  Eyes: extraocular movements intact, no scleral icterus, no conjunctival injection  Ears, Nose, and Throat: external ears normal, nares patent, moist mucous membranes  Cardiovascular: regular rate and rhythm. no murmurs, rales, or rhonchi. no edema  Respiratory: breathing comfortably, symmetric chest rise, clear to auscultation bilaterally. No wheezes, rales, or rhonchi.  Neuro: alert and oriented to time, place, and person. Normal gait  Psych: normal mood and affect     Result Review :   The following data was reviewed by: Alex South Jr, MD on 08/17/2021:  Common labs    Common Labsle 11/16/20 11/16/20 11/17/20 11/17/20 1/4/21 1/4/21    0433 0433 0431 0431 2039 2039   Glucose  86  110 (A) 85    BUN  12  10 9    Creatinine  0.64 (A)  0.82 0.75    Sodium  135  133 (A) 135    Potassium  3.8  3.6 4.8    Chloride  98 (A)  96 (A) 99    Calcium  8.5 (A)  8.9 9.3    Albumin    2.9 (A) 4.0    Total Bilirubin    0.53 0.54    Alkaline Phosphatase    80 152 (A)    AST (SGOT)    19 38    ALT (SGPT)    15 37    WBC 9.14  12.58 (A)  9.31    Hemoglobin 12.0 (A)  11.9 (A)  12.9 (A)    Hematocrit 34.8 (A)  35.0 (A)  " 39.0 (A)    Platelets 286  299  371    Total Cholesterol     150    Triglycerides     77    HDL Cholesterol     56    LDL Cholesterol      79    Hemoglobin A1C      5.0   PSA     2.63    (A) Abnormal value       Comments are available for some flowsheets but are not being displayed.              Assessment and Plan    Diagnoses and all orders for this visit:    1. Essential hypertension (Primary)  Comments:  well controlled. cont home monitoring.     2. Mixed hyperlipidemia  Comments:  check labs. cont statin  Orders:  -     Lipid Panel  -     Comprehensive Metabolic Panel    3. Hypothyroidism, unspecified type  Comments:  check TSH and adjust synthroid accordingly  Orders:  -     TSH  -     levothyroxine (SYNTHROID, LEVOTHROID) 150 MCG tablet; Take 1 tablet by mouth Daily for 90 days.  Dispense: 90 tablet; Refill: 1    4. Type 2 diabetes mellitus without complication, without long-term current use of insulin (CMS/[facility])  Comments:  check labs. cont diet control.  Orders:  -     CBC & Differential  -     Hemoglobin A1c  -     MicroAlbumin, Urine, Random - Urine, Clean Catch    5. H/O splenectomy  Comments:  discusased covid booster vaccination  Orders:  -     CBC & Differential    6. Need for pneumococcal vaccination    Other orders  -     Pneumococcal Polysaccharide Vaccine 23-Valent Greater Than or Equal To 3yo Subcutaneous / IM        Follow Up   Return in about 6 months (around 2/17/2022) for Recheck.  Patient was given instructions and counseling regarding his condition or for health maintenance advice. Please see specific information pulled into the AVS if appropriate.

## 2021-08-18 LAB
ALBUMIN SERPL-MCNC: 4.2 G/DL (ref 3.5–5.2)
ALBUMIN/GLOB SERPL: 1.4 G/DL
ALP SERPL-CCNC: 85 U/L (ref 39–117)
ALT SERPL W P-5'-P-CCNC: 25 U/L (ref 1–41)
ANION GAP SERPL CALCULATED.3IONS-SCNC: 10.6 MMOL/L (ref 5–15)
AST SERPL-CCNC: 35 U/L (ref 1–40)
BILIRUB SERPL-MCNC: 0.3 MG/DL (ref 0–1.2)
BUN SERPL-MCNC: 10 MG/DL (ref 6–20)
BUN/CREAT SERPL: 11.5 (ref 7–25)
CALCIUM SPEC-SCNC: 9.4 MG/DL (ref 8.6–10.5)
CHLORIDE SERPL-SCNC: 101 MMOL/L (ref 98–107)
CO2 SERPL-SCNC: 25.4 MMOL/L (ref 22–29)
CREAT SERPL-MCNC: 0.87 MG/DL (ref 0.76–1.27)
GFR SERPL CREATININE-BSD FRML MDRD: 90 ML/MIN/1.73
GLOBULIN UR ELPH-MCNC: 2.9 GM/DL
GLUCOSE SERPL-MCNC: 67 MG/DL (ref 65–99)
POTASSIUM SERPL-SCNC: 5.2 MMOL/L (ref 3.5–5.2)
PROT SERPL-MCNC: 7.1 G/DL (ref 6–8.5)
SODIUM SERPL-SCNC: 137 MMOL/L (ref 136–145)

## 2021-09-14 DIAGNOSIS — E78.2 MIXED HYPERLIPIDEMIA: Primary | ICD-10-CM

## 2021-09-14 RX ORDER — SIMVASTATIN 40 MG
40 TABLET ORAL DAILY
Qty: 90 TABLET | Refills: 3 | Status: SHIPPED | OUTPATIENT
Start: 2021-09-14 | End: 2022-09-12

## 2022-01-17 RX ORDER — ASPIRIN 81 MG/1
TABLET, COATED ORAL
Qty: 90 TABLET | Refills: 3 | Status: SHIPPED | OUTPATIENT
Start: 2022-01-17 | End: 2023-01-16

## 2022-02-01 NOTE — PROGRESS NOTES
Date of Office Visit: 2022  Encounter Provider: Dr. Froy Mclain  Place of Service: [facility] CARDIOLOGY [place]  Patient Name: Juan Miguel Chairez  :1960  Alex South Jr., MD    Chief Complaint   Patient presents with   • Coronary Artery Disease     9 month follow up   • Cardiomyopathy   • Hypertension   • Hyperlipidemia   • Diabetes     History of Present Illness    I am pleased to see Mr. Chairez in my office today for a follow-up     As you know, patient is 61 years old white gentleman whose past medical history is significant for hypertension, hyperlipidemia, diabetes mellitus, coronary artery disease, status post coronary artery bypass graft, cardiomyopathy, who came today for yearly  followup.     In , patient had cardiac workup for his symptom of chest pain.  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  Patient underwent cardiac catheterization which showed 100% lad stenosis, 25% LCX stenosis, RCA had 90% stenosis.  Patient underwent  left ventricle aneurysmectomy and CABG. In 2017, patient had echocardiogram which showed EF of 55% and akinetic distal anterior wall.  Mild aortic stenosis noted.    In 2021, patient underwent echocardiogram which showed EF of 50 to 55%.  Severe aortic stenosis noted with aortic valve area of 0.83 cm² and peak velocity of 3.5 m/s noted.  Peak gradient was 50 mmHg and mean gradient was 30.4 mmHg.    Patient is seen and examined and findings are verified.  Patient is doing fairly well.  Patient is walking for 3 to 4 miles in the local [place] during wintertime.  Patient does not report any symptom of shortness of breath, orthopnea, PND, syncope or presyncope.  No leg edema noted.    EKG showed normal sinus rhythm.  LVH is noted.    Patient is overall doing well.  He is relatively asymptomatic.  I will see the patient in 9 months.  Echocardiogram would be done to assess aortic stenosis.        Past Medical History:    Diagnosis Date   • Benign essential HTN    • CAD (coronary artery disease) of artery bypass graft    • Cardiomyopathy (HCC)    • Coronary artery disease    • DM2 (diabetes mellitus, type 2) (HCC)    • Hodgkin's lymphoma (HCC) 1984    Malignant   • Hyperlipidemia, mixed    • Hypothyroidism          Past Surgical History:   Procedure Laterality Date   • ABDOMINAL AORTIC ANEURYSM REPAIR  2011   • CARDIAC CATHETERIZATION     • COLONOSCOPY  2013    [facility]   • CORONARY ARTERY BYPASS GRAFT  2010   • SPLENECTOMY  1983           Current Outpatient Medications:   •  Aspirin Low Dose 81 MG EC tablet, TAKE ONE TABLET BY MOUTH DAILY, Disp: 90 tablet, Rfl: 3  •  levothyroxine (SYNTHROID, LEVOTHROID) 150 MCG tablet, Take 150 mcg by mouth Daily., Disp: , Rfl:   •  losartan (COZAAR) 100 MG tablet, Take 1 tablet by mouth Daily., Disp: 90 tablet, Rfl: 2  •  metoprolol tartrate (LOPRESSOR) 25 MG tablet, TAKE ONE TABLET BY MOUTH TWICE A DAY, Disp: 180 tablet, Rfl: 2  •  simvastatin (ZOCOR) 40 MG tablet, Take 1 tablet by mouth Daily., Disp: 90 tablet, Rfl: 3      Social History     Socioeconomic History   • Marital status:    Tobacco Use   • Smoking status: Former Smoker   • Smokeless tobacco: Never Used   • Tobacco comment: less than 1ppd   Vaping Use   • Vaping Use: Never used   Substance and Sexual Activity   • Alcohol use: Yes     Comment: current some day drinks rarely   • Drug use: Never   • Sexual activity: Defer         Review of Systems   Constitutional: Negative for chills and fever.   HENT: Negative for ear discharge and nosebleeds.    Eyes: Negative for discharge and redness.   Cardiovascular: Negative for chest pain, orthopnea, palpitations, paroxysmal nocturnal dyspnea and syncope.   Respiratory: Positive for shortness of breath. Negative for cough and wheezing.    Endocrine: Negative for heat intolerance.   Skin: Negative for rash.   Musculoskeletal: Positive for arthritis and joint pain. Negative for myalgias.  "  Gastrointestinal: Negative for abdominal pain, melena, nausea and vomiting.   Genitourinary: Negative for dysuria and hematuria.   Neurological: Negative for dizziness, light-headedness, numbness and tremors.   Psychiatric/Behavioral: Negative for depression. The patient is not nervous/anxious.        Procedures      ECG 12 Lead    Date/Time: 2/2/2022 12:45 PM  Performed by: Froy Mclain MD  Authorized by: Froy Mclain MD   Comparison: compared with previous ECG   Similar to previous ECG  Rhythm: sinus rhythm  Other findings: left ventricular hypertrophy    Clinical impression: abnormal EKG            ECG 12 Lead    (Results Pending)           Objective:    /92   Pulse 67   Ht 172.7 cm (67.99\")   Wt 78.5 kg (173 lb)   BMI 26.31 kg/m²         Constitutional:       Appearance: Well-developed.   Eyes:      General: No scleral icterus.        Right eye: No discharge.   HENT:      Head: Normocephalic and atraumatic.   Neck:      Thyroid: No thyromegaly.      Lymphadenopathy: No cervical adenopathy.   Pulmonary:      Effort: Pulmonary effort is normal. No respiratory distress.      Breath sounds: Normal breath sounds. No wheezing. No rales.   Cardiovascular:      Normal rate. Regular rhythm.      No gallop.   Abdominal:      Tenderness: There is no abdominal tenderness.   Skin:     Findings: No erythema or rash.   Neurological:      Mental Status: Alert and oriented to person, place, and time.             Assessment:       Diagnosis Plan   1. Essential hypertension  ECG 12 Lead    Adult Transthoracic Echo Complete W/ Cont if Necessary Per Protocol   2. Coronary artery disease involving native coronary artery of native heart without angina pectoris  ECG 12 Lead    Adult Transthoracic Echo Complete W/ Cont if Necessary Per Protocol   3. Cardiomyopathy, unspecified type (HCC)  ECG 12 Lead    Adult Transthoracic Echo Complete W/ Cont if Necessary Per Protocol   4. Type 2 diabetes mellitus without complication, " without long-term current use of insulin (HCC)  ECG 12 Lead    Adult Transthoracic Echo Complete W/ Cont if Necessary Per Protocol   5. Mixed hyperlipidemia  ECG 12 Lead    Adult Transthoracic Echo Complete W/ Cont if Necessary Per Protocol   6. Aortic valve stenosis, etiology of cardiac valve disease unspecified  ECG 12 Lead    Adult Transthoracic Echo Complete W/ Cont if Necessary Per Protocol            Plan:       MDM:    1.  Aortic stenosis:    Repeat echocardiogram to assess severity of aortic stenosis    2.  Cardiomyopathy:    Clinically patient is not in CHF.  Continue current treatment.  Patient is on metoprolol and losartan    3.  CAD:    Patient underwent CABG and did well.    3.  S/p LV aneurysm repair.:    Patient has done well.  Continue aspirin    4.  Hyperlipidemia:    Patient is on Lipitor.  LDL is 61

## 2022-02-02 ENCOUNTER — OFFICE VISIT (OUTPATIENT)
Dept: CARDIOLOGY | Facility: CLINIC | Age: 62
End: 2022-02-02

## 2022-02-02 VITALS
BODY MASS INDEX: 26.22 KG/M2 | WEIGHT: 173 LBS | HEART RATE: 67 BPM | HEIGHT: 68 IN | DIASTOLIC BLOOD PRESSURE: 92 MMHG | SYSTOLIC BLOOD PRESSURE: 158 MMHG

## 2022-02-02 DIAGNOSIS — I35.0 AORTIC VALVE STENOSIS, ETIOLOGY OF CARDIAC VALVE DISEASE UNSPECIFIED: ICD-10-CM

## 2022-02-02 DIAGNOSIS — E78.2 MIXED HYPERLIPIDEMIA: ICD-10-CM

## 2022-02-02 DIAGNOSIS — I42.9 CARDIOMYOPATHY, UNSPECIFIED TYPE: ICD-10-CM

## 2022-02-02 DIAGNOSIS — E11.9 TYPE 2 DIABETES MELLITUS WITHOUT COMPLICATION, WITHOUT LONG-TERM CURRENT USE OF INSULIN: ICD-10-CM

## 2022-02-02 DIAGNOSIS — I10 ESSENTIAL HYPERTENSION: Primary | ICD-10-CM

## 2022-02-02 DIAGNOSIS — I25.10 CORONARY ARTERY DISEASE INVOLVING NATIVE CORONARY ARTERY OF NATIVE HEART WITHOUT ANGINA PECTORIS: ICD-10-CM

## 2022-02-02 PROCEDURE — 93000 ELECTROCARDIOGRAM COMPLETE: CPT | Performed by: INTERNAL MEDICINE

## 2022-02-02 PROCEDURE — 99214 OFFICE O/P EST MOD 30 MIN: CPT | Performed by: INTERNAL MEDICINE

## 2022-02-02 RX ORDER — LEVOTHYROXINE SODIUM 0.15 MG/1
150 TABLET ORAL DAILY
COMMUNITY
End: 2022-03-01

## 2022-02-17 ENCOUNTER — OFFICE VISIT (OUTPATIENT)
Dept: INTERNAL MEDICINE | Facility: CLINIC | Age: 62
End: 2022-02-17

## 2022-02-17 VITALS
WEIGHT: 165 LBS | OXYGEN SATURATION: 97 % | TEMPERATURE: 97.1 F | BODY MASS INDEX: 25.01 KG/M2 | HEIGHT: 68 IN | SYSTOLIC BLOOD PRESSURE: 145 MMHG | DIASTOLIC BLOOD PRESSURE: 76 MMHG | HEART RATE: 70 BPM

## 2022-02-17 DIAGNOSIS — E78.2 MIXED HYPERLIPIDEMIA: ICD-10-CM

## 2022-02-17 DIAGNOSIS — I35.0 NONRHEUMATIC AORTIC VALVE STENOSIS: ICD-10-CM

## 2022-02-17 DIAGNOSIS — E03.9 HYPOTHYROIDISM, UNSPECIFIED TYPE: ICD-10-CM

## 2022-02-17 DIAGNOSIS — I10 ESSENTIAL HYPERTENSION: ICD-10-CM

## 2022-02-17 DIAGNOSIS — I25.10 CORONARY ARTERY DISEASE INVOLVING NATIVE CORONARY ARTERY OF NATIVE HEART WITHOUT ANGINA PECTORIS: ICD-10-CM

## 2022-02-17 DIAGNOSIS — Z00.00 ANNUAL PHYSICAL EXAM: Primary | ICD-10-CM

## 2022-02-17 DIAGNOSIS — Z90.81 H/O SPLENECTOMY: ICD-10-CM

## 2022-02-17 DIAGNOSIS — E11.9 TYPE 2 DIABETES MELLITUS WITHOUT COMPLICATION, WITHOUT LONG-TERM CURRENT USE OF INSULIN: ICD-10-CM

## 2022-02-17 LAB
ALBUMIN SERPL-MCNC: 3.9 G/DL (ref 3.5–5.2)
ALBUMIN/GLOB SERPL: 1.3 G/DL
ALP SERPL-CCNC: 124 U/L (ref 39–117)
ALT SERPL W P-5'-P-CCNC: 38 U/L (ref 1–41)
ANION GAP SERPL CALCULATED.3IONS-SCNC: 7 MMOL/L (ref 5–15)
AST SERPL-CCNC: 32 U/L (ref 1–40)
BASOPHILS # BLD AUTO: 0.09 10*3/MM3 (ref 0–0.2)
BASOPHILS NFR BLD AUTO: 1 % (ref 0–1.5)
BILIRUB SERPL-MCNC: 0.4 MG/DL (ref 0–1.2)
BUN SERPL-MCNC: 9 MG/DL (ref 6–20)
BUN/CREAT SERPL: 13 (ref 7–25)
CALCIUM SPEC-SCNC: 9.3 MG/DL (ref 8.6–10.5)
CHLORIDE SERPL-SCNC: 102 MMOL/L (ref 98–107)
CHOLEST SERPL-MCNC: 149 MG/DL (ref 0–200)
CO2 SERPL-SCNC: 26 MMOL/L (ref 22–29)
CREAT SERPL-MCNC: 0.69 MG/DL (ref 0.76–1.27)
DEPRECATED RDW RBC AUTO: 49.7 FL (ref 37–54)
EOSINOPHIL # BLD AUTO: 0.2 10*3/MM3 (ref 0–0.4)
EOSINOPHIL NFR BLD AUTO: 2.1 % (ref 0.3–6.2)
ERYTHROCYTE [DISTWIDTH] IN BLOOD BY AUTOMATED COUNT: 14.9 % (ref 12.3–15.4)
GFR SERPL CREATININE-BSD FRML MDRD: 117 ML/MIN/1.73
GLOBULIN UR ELPH-MCNC: 2.9 GM/DL
GLUCOSE SERPL-MCNC: 91 MG/DL (ref 65–99)
HBA1C MFR BLD: 5.2 % (ref 4.8–5.6)
HCT VFR BLD AUTO: 40.4 % (ref 37.5–51)
HDLC SERPL-MCNC: 55 MG/DL (ref 40–60)
HGB BLD-MCNC: 13.3 G/DL (ref 13–17.7)
IMM GRANULOCYTES # BLD AUTO: 0.02 10*3/MM3 (ref 0–0.05)
IMM GRANULOCYTES NFR BLD AUTO: 0.2 % (ref 0–0.5)
LDLC SERPL CALC-MCNC: 78 MG/DL (ref 0–100)
LDLC/HDLC SERPL: 1.41 {RATIO}
LYMPHOCYTES # BLD AUTO: 2.34 10*3/MM3 (ref 0.7–3.1)
LYMPHOCYTES NFR BLD AUTO: 24.8 % (ref 19.6–45.3)
MCH RBC QN AUTO: 30.2 PG (ref 26.6–33)
MCHC RBC AUTO-ENTMCNC: 32.9 G/DL (ref 31.5–35.7)
MCV RBC AUTO: 91.6 FL (ref 79–97)
MONOCYTES # BLD AUTO: 0.84 10*3/MM3 (ref 0.1–0.9)
MONOCYTES NFR BLD AUTO: 8.9 % (ref 5–12)
NEUTROPHILS NFR BLD AUTO: 5.94 10*3/MM3 (ref 1.7–7)
NEUTROPHILS NFR BLD AUTO: 63 % (ref 42.7–76)
NRBC BLD AUTO-RTO: 0 /100 WBC (ref 0–0.2)
PLATELET # BLD AUTO: 376 10*3/MM3 (ref 140–450)
PMV BLD AUTO: 11.8 FL (ref 6–12)
POTASSIUM SERPL-SCNC: 4.4 MMOL/L (ref 3.5–5.2)
PROT SERPL-MCNC: 6.8 G/DL (ref 6–8.5)
RBC # BLD AUTO: 4.41 10*6/MM3 (ref 4.14–5.8)
SODIUM SERPL-SCNC: 135 MMOL/L (ref 136–145)
TRIGL SERPL-MCNC: 82 MG/DL (ref 0–150)
TSH SERPL DL<=0.05 MIU/L-ACNC: 2.39 UIU/ML (ref 0.27–4.2)
VLDLC SERPL-MCNC: 16 MG/DL (ref 5–40)
WBC NRBC COR # BLD: 9.43 10*3/MM3 (ref 3.4–10.8)

## 2022-02-17 PROCEDURE — 83036 HEMOGLOBIN GLYCOSYLATED A1C: CPT | Performed by: INTERNAL MEDICINE

## 2022-02-17 PROCEDURE — 80061 LIPID PANEL: CPT | Performed by: INTERNAL MEDICINE

## 2022-02-17 PROCEDURE — 99396 PREV VISIT EST AGE 40-64: CPT | Performed by: INTERNAL MEDICINE

## 2022-02-17 PROCEDURE — 80050 GENERAL HEALTH PANEL: CPT | Performed by: INTERNAL MEDICINE

## 2022-02-17 NOTE — PROGRESS NOTES
"Chief Complaint  Annual physical and Hypertension    Subjective          Juan Miguel Chairez presents to [facility]  History of Present Illness  hypertension- patient reports home readings 100s/70s. Patient denies Has, dizziness, chest pain.   Hypothyroid- due for TSH recheck.   hyperlipidemia- doing well on statin  DM2- diet controlled.   coronary artery disease- doing well on BB, statin, and aspirin. Patient denies chest pain and shortness of breath. Patient reports walking 5 miles without much issue.   AS- repeat echo pending.     Current Outpatient Medications   Medication Instructions   • Aspirin Low Dose 81 MG EC tablet TAKE ONE TABLET BY MOUTH DAILY   • levothyroxine (SYNTHROID, LEVOTHROID) 150 mcg, Oral, Daily   • losartan (COZAAR) 100 mg, Oral, Daily   • metoprolol tartrate (LOPRESSOR) 25 MG tablet TAKE ONE TABLET BY MOUTH TWICE A DAY   • simvastatin (ZOCOR) 40 mg, Oral, Daily       The following portions of the patient's history were reviewed and updated as appropriate: allergies, current medications, past family history, past medical history, past social history, past surgical history, and problem list.    Objective   Vital Signs:   /76   Pulse 70   Temp 97.1 °F (36.2 °C) (Temporal)   Ht 172.7 cm (68\")   Wt 74.8 kg (165 lb)   SpO2 97%   BMI 25.09 kg/m²     Wt Readings from Last 3 Encounters:   02/17/22 74.8 kg (165 lb)   02/02/22 78.5 kg (173 lb)   08/17/21 76.7 kg (169 lb)     BP Readings from Last 3 Encounters:   02/17/22 145/76   02/02/22 158/92   08/17/21 147/82     Physical Exam   Appearance: No acute distress, well-nourished  Head: normocephalic, atraumatic  Eyes: extraocular movements intact, no scleral icterus, no conjunctival injection  Ears, Nose, and Throat: external ears normal, nares patent, moist mucous membranes  Cardiovascular: regular rate and rhythm. no murmurs, rales, or rhonchi. no edema  Respiratory: breathing comfortably, " symmetric chest rise, clear to auscultation bilaterally. No wheezes, rales, or rhonchi.  Neuro: alert and oriented to time, place, and person. Normal gait  Psych: normal mood and affect     Result Review :   The following data was reviewed by: Alex South Jr, MD on 02/17/2022:  Common labs    Common Labsle 8/17/21 8/17/21 8/17/21 8/17/21 8/17/21    1241 1242 1242 1242 1242   Glucose     67   BUN     10   Creatinine     0.87   eGFR Non African Am     90   Sodium     137   Potassium     5.2   Chloride     101   Calcium     9.4   Albumin     4.20   Total Bilirubin     0.3   Alkaline Phosphatase     85   AST (SGOT)     35   ALT (SGPT)     25   WBC 8.66       Hemoglobin 13.9       Hematocrit 44.6       Platelets 460 (A)       Total Cholesterol   158     Triglycerides   63     HDL Cholesterol   61 (A)     LDL Cholesterol    84     Hemoglobin A1C  5.35      Microalbumin, Urine    <1.2    (A) Abnormal value              Lab Results   Component Value Date    COVID19 NOT DETECTED 11/17/2020    BILIRUBINUR Negative 05/24/2021        Assessment and Plan    Diagnoses and all orders for this visit:    1. Annual physical exam (Primary)    2. Essential hypertension  Comments:  well controlled based on home readings.  Orders:  -     CBC & Differential  -     Comprehensive Metabolic Panel    3. Mixed hyperlipidemia  Comments:  check labs. cont statin.   Orders:  -     Lipid Panel    4. Hypothyroidism, unspecified type  Comments:  check TSH and asjut synthroid accoridngly.   Orders:  -     TSH    5. Type 2 diabetes mellitus without complication, without long-term current use of insulin (HCC)  Comments:  diet controlled. check Hgb1c. if normal, will consider pt cured of DM.   Orders:  -     Hemoglobin A1c    6. Nonrheumatic aortic valve stenosis  Comments:  echo pending. cont f/u with cardiology    7. Coronary artery disease involving native coronary artery of native heart without angina pectoris  Comments:  doing well on  BB, ASA, statin.     8. H/O splenectomy  Comments:  PCV due in 8/2022      Advised on diet, physical activity, etc      There are no discontinued medications.     Follow Up   Return in about 6 months (around 8/17/2022) for Recheck.  Patient was given instructions and counseling regarding his condition or for health maintenance advice. Please see specific information pulled into the AVS if appropriate.

## 2022-02-18 PROBLEM — E11.9 TYPE 2 DIABETES MELLITUS: Status: RESOLVED | Noted: 2020-10-15 | Resolved: 2022-02-18

## 2022-03-01 RX ORDER — LEVOTHYROXINE SODIUM 0.15 MG/1
TABLET ORAL
Qty: 90 TABLET | Refills: 3 | Status: SHIPPED | OUTPATIENT
Start: 2022-03-01 | End: 2023-02-22

## 2022-04-28 DIAGNOSIS — I10 ESSENTIAL HYPERTENSION: ICD-10-CM

## 2022-04-28 RX ORDER — LOSARTAN POTASSIUM 100 MG/1
TABLET ORAL
Qty: 90 TABLET | Refills: 3 | Status: SHIPPED | OUTPATIENT
Start: 2022-04-28

## 2022-07-18 ENCOUNTER — CLINICAL SUPPORT (OUTPATIENT)
Dept: INTERNAL MEDICINE | Facility: CLINIC | Age: 62
End: 2022-07-18

## 2022-07-18 ENCOUNTER — TELEPHONE (OUTPATIENT)
Dept: INTERNAL MEDICINE | Facility: CLINIC | Age: 62
End: 2022-07-18

## 2022-07-18 DIAGNOSIS — Z20.822 EXPOSURE TO COVID-19 VIRUS: Primary | ICD-10-CM

## 2022-07-18 LAB
EXPIRATION DATE: ABNORMAL
FLUAV AG UPPER RESP QL IA.RAPID: NOT DETECTED
FLUBV AG UPPER RESP QL IA.RAPID: NOT DETECTED
INTERNAL CONTROL: ABNORMAL
Lab: ABNORMAL
SARS-COV-2 AG UPPER RESP QL IA.RAPID: DETECTED

## 2022-07-18 PROCEDURE — 87428 SARSCOV & INF VIR A&B AG IA: CPT | Performed by: INTERNAL MEDICINE

## 2022-07-18 NOTE — TELEPHONE ENCOUNTER
----- Message from Alex South Jr., MD sent at 7/18/2022  2:56 PM EDT -----  Covid +. Will send Prescription paxlovid. Please notify patient. This medication seems to work better the earlier in the disease course that it is taken.

## 2022-07-18 NOTE — TELEPHONE ENCOUNTER
Covid +. Will send Prescription paxlovid. Please notify patient. This medication seems to work better the earlier in the disease course that it is taken.   Written by Alex South Jr., MD on 7/18/2022  2:56 PM EDT  Seen by patient Harjinder Chairez on 7/18/2022  3:05 PM

## 2022-07-19 ENCOUNTER — PATIENT ROUNDING (BHMG ONLY) (OUTPATIENT)
Dept: INTERNAL MEDICINE | Facility: CLINIC | Age: 62
End: 2022-07-19

## 2022-07-20 ENCOUNTER — TELEPHONE (OUTPATIENT)
Dept: INTERNAL MEDICINE | Facility: CLINIC | Age: 62
End: 2022-07-20

## 2022-07-20 NOTE — TELEPHONE ENCOUNTER
Caller: Juan Miguel Chairez    Relationship to patient: Self    Best call back number:[phone]    Patient is needing: PATIENT CALLED IN AND SAID HE WOULD LIKE A HARD COPY OF HIS RESULTS FROM HIS COVID TEST SENT TO HIS HOUSE PLEASE.    [address] [place] [id number]

## 2022-08-18 ENCOUNTER — OFFICE VISIT (OUTPATIENT)
Dept: INTERNAL MEDICINE | Facility: CLINIC | Age: 62
End: 2022-08-18

## 2022-08-18 VITALS
SYSTOLIC BLOOD PRESSURE: 137 MMHG | HEART RATE: 70 BPM | OXYGEN SATURATION: 96 % | WEIGHT: 171 LBS | HEIGHT: 68 IN | DIASTOLIC BLOOD PRESSURE: 83 MMHG | BODY MASS INDEX: 25.91 KG/M2 | TEMPERATURE: 98.6 F

## 2022-08-18 DIAGNOSIS — E78.2 MIXED HYPERLIPIDEMIA: ICD-10-CM

## 2022-08-18 DIAGNOSIS — I35.0 NONRHEUMATIC AORTIC VALVE STENOSIS: ICD-10-CM

## 2022-08-18 DIAGNOSIS — I10 ESSENTIAL HYPERTENSION: Primary | ICD-10-CM

## 2022-08-18 DIAGNOSIS — Z23 NEED FOR PNEUMOCOCCAL VACCINATION: ICD-10-CM

## 2022-08-18 DIAGNOSIS — Z23 NEED FOR VACCINATION: ICD-10-CM

## 2022-08-18 DIAGNOSIS — E03.9 HYPOTHYROIDISM, UNSPECIFIED TYPE: ICD-10-CM

## 2022-08-18 LAB
ALBUMIN SERPL-MCNC: 4.1 G/DL (ref 3.5–5.2)
ALBUMIN/GLOB SERPL: 1.6 G/DL
ALP SERPL-CCNC: 77 U/L (ref 39–117)
ALT SERPL W P-5'-P-CCNC: 21 U/L (ref 1–41)
ANION GAP SERPL CALCULATED.3IONS-SCNC: 9.1 MMOL/L (ref 5–15)
AST SERPL-CCNC: 33 U/L (ref 1–40)
BASOPHILS # BLD AUTO: 0.09 10*3/MM3 (ref 0–0.2)
BASOPHILS NFR BLD AUTO: 1.1 % (ref 0–1.5)
BILIRUB SERPL-MCNC: 0.3 MG/DL (ref 0–1.2)
BUN SERPL-MCNC: 7 MG/DL (ref 8–23)
BUN/CREAT SERPL: 8.3 (ref 7–25)
CALCIUM SPEC-SCNC: 8.7 MG/DL (ref 8.6–10.5)
CHLORIDE SERPL-SCNC: 103 MMOL/L (ref 98–107)
CHOLEST SERPL-MCNC: 152 MG/DL (ref 0–200)
CO2 SERPL-SCNC: 25.9 MMOL/L (ref 22–29)
CREAT SERPL-MCNC: 0.84 MG/DL (ref 0.76–1.27)
DEPRECATED RDW RBC AUTO: 48.2 FL (ref 37–54)
EGFRCR SERPLBLD CKD-EPI 2021: 99.8 ML/MIN/1.73
EOSINOPHIL # BLD AUTO: 0.25 10*3/MM3 (ref 0–0.4)
EOSINOPHIL NFR BLD AUTO: 3 % (ref 0.3–6.2)
ERYTHROCYTE [DISTWIDTH] IN BLOOD BY AUTOMATED COUNT: 15.2 % (ref 12.3–15.4)
GLOBULIN UR ELPH-MCNC: 2.5 GM/DL
GLUCOSE SERPL-MCNC: 98 MG/DL (ref 65–99)
HCT VFR BLD AUTO: 37.6 % (ref 37.5–51)
HDLC SERPL-MCNC: 53 MG/DL (ref 40–60)
HGB BLD-MCNC: 12.6 G/DL (ref 13–17.7)
IMM GRANULOCYTES # BLD AUTO: 0.02 10*3/MM3 (ref 0–0.05)
IMM GRANULOCYTES NFR BLD AUTO: 0.2 % (ref 0–0.5)
LDLC SERPL CALC-MCNC: 86 MG/DL (ref 0–100)
LDLC/HDLC SERPL: 1.63 {RATIO}
LYMPHOCYTES # BLD AUTO: 1.71 10*3/MM3 (ref 0.7–3.1)
LYMPHOCYTES NFR BLD AUTO: 20.7 % (ref 19.6–45.3)
MCH RBC QN AUTO: 29.6 PG (ref 26.6–33)
MCHC RBC AUTO-ENTMCNC: 33.5 G/DL (ref 31.5–35.7)
MCV RBC AUTO: 88.5 FL (ref 79–97)
MONOCYTES # BLD AUTO: 0.85 10*3/MM3 (ref 0.1–0.9)
MONOCYTES NFR BLD AUTO: 10.3 % (ref 5–12)
NEUTROPHILS NFR BLD AUTO: 5.34 10*3/MM3 (ref 1.7–7)
NEUTROPHILS NFR BLD AUTO: 64.7 % (ref 42.7–76)
NRBC BLD AUTO-RTO: 0 /100 WBC (ref 0–0.2)
PLATELET # BLD AUTO: 347 10*3/MM3 (ref 140–450)
PMV BLD AUTO: 11.9 FL (ref 6–12)
POTASSIUM SERPL-SCNC: 4.6 MMOL/L (ref 3.5–5.2)
PROT SERPL-MCNC: 6.6 G/DL (ref 6–8.5)
RBC # BLD AUTO: 4.25 10*6/MM3 (ref 4.14–5.8)
SODIUM SERPL-SCNC: 138 MMOL/L (ref 136–145)
TRIGL SERPL-MCNC: 64 MG/DL (ref 0–150)
TSH SERPL DL<=0.05 MIU/L-ACNC: 1.55 UIU/ML (ref 0.27–4.2)
VLDLC SERPL-MCNC: 13 MG/DL (ref 5–40)
WBC NRBC COR # BLD: 8.26 10*3/MM3 (ref 3.4–10.8)

## 2022-08-18 PROCEDURE — 90677 PCV20 VACCINE IM: CPT | Performed by: INTERNAL MEDICINE

## 2022-08-18 PROCEDURE — 90471 IMMUNIZATION ADMIN: CPT | Performed by: INTERNAL MEDICINE

## 2022-08-18 PROCEDURE — 80050 GENERAL HEALTH PANEL: CPT | Performed by: INTERNAL MEDICINE

## 2022-08-18 PROCEDURE — 90472 IMMUNIZATION ADMIN EACH ADD: CPT | Performed by: INTERNAL MEDICINE

## 2022-08-18 PROCEDURE — 90714 TD VACC NO PRESV 7 YRS+ IM: CPT | Performed by: INTERNAL MEDICINE

## 2022-08-18 PROCEDURE — 99214 OFFICE O/P EST MOD 30 MIN: CPT | Performed by: INTERNAL MEDICINE

## 2022-08-18 PROCEDURE — 80061 LIPID PANEL: CPT | Performed by: INTERNAL MEDICINE

## 2022-08-18 RX ORDER — TRIAMCINOLONE ACETONIDE 1 MG/G
CREAM TOPICAL
COMMUNITY
Start: 2022-05-24

## 2022-08-18 NOTE — PROGRESS NOTES
"Chief Complaint  Follow-up, Hypertension, Hyperlipidemia, and Diabetes    Subjective          Juan Miguel Chairez presents to [facility]  History of Present Illness  hypertension- patient reports home readings have been typically 110s/70s. Patient denies HEADACHES, dizziness, chest pain.   hyperlipidemia- doing well on statin  Hypothyroid- due for recheck  Upcoming echo for AS.     Current Outpatient Medications   Medication Instructions   • Aspirin Low Dose 81 MG EC tablet TAKE ONE TABLET BY MOUTH DAILY   • levothyroxine (SYNTHROID, LEVOTHROID) 150 MCG tablet TAKE ONE TABLET BY MOUTH DAILY   • losartan (COZAAR) 100 MG tablet TAKE ONE TABLET BY MOUTH DAILY   • metoprolol tartrate (LOPRESSOR) 25 MG tablet TAKE ONE TABLET BY MOUTH TWICE A DAY   • simvastatin (ZOCOR) 40 mg, Oral, Daily   • triamcinolone (KENALOG) 0.1 % cream No dose, route, or frequency recorded.       The following portions of the patient's history were reviewed and updated as appropriate: allergies, current medications, past family history, past medical history, past social history, past surgical history, and problem list.    Objective   Vital Signs:   /83   Pulse 70   Temp 98.6 °F (37 °C) (Temporal)   Ht 172.7 cm (68\")   Wt 77.6 kg (171 lb)   SpO2 96%   BMI 26.00 kg/m²     Wt Readings from Last 3 Encounters:   08/18/22 77.6 kg (171 lb)   02/17/22 74.8 kg (165 lb)   02/02/22 78.5 kg (173 lb)     BP Readings from Last 3 Encounters:   08/18/22 137/83   02/17/22 145/76   02/02/22 158/92     Physical Exam   Appearance: No acute distress, well-nourished  Head: normocephalic, atraumatic  Eyes: extraocular movements intact, no scleral icterus, no conjunctival injection  Ears, Nose, and Throat: external ears normal, nares patent, moist mucous membranes  Cardiovascular: regular rate and rhythm. 3/6 KARISSA murmurs heard best at RUSB, No rubs or gallops. no edema  Respiratory: breathing comfortably, symmetric " chest rise, clear to auscultation bilaterally. No wheezes, rales, or rhonchi.  Neuro: alert and oriented to time, place, and person. Normal gait  Psych: normal mood and affect     Diabetic foot exam:   Left: Filament test present   Pulses Dorsalis Pedis:  present   Vibratory sensation normal   Proprioception normal   Sharp/dull discrimination normal       Right: Filament test present   Pulses Dorsalis Pedis:  present   Vibratory sensation normal   Proprioception normal   Sharp/dull discrimination normal      Result Review :   The following data was reviewed by: Alex South Jr, MD on 08/18/2022:  Common labs    Common Labsle 2/17/22 2/17/22 2/17/22 2/17/22    1622 1622 1622 1622   Glucose   91    BUN   9    Creatinine   0.69 (A)    eGFR Non African Am   117    Sodium   135 (A)    Potassium   4.4    Chloride   102    Calcium   9.3    Albumin   3.90    Total Bilirubin   0.4    Alkaline Phosphatase   124 (A)    AST (SGOT)   32    ALT (SGPT)   38    WBC 9.43      Hemoglobin 13.3      Hematocrit 40.4      Platelets 376      Total Cholesterol  149     Triglycerides  82     HDL Cholesterol  55     LDL Cholesterol   78     Hemoglobin A1C    5.20   (A) Abnormal value              Lab Results   Component Value Date    SARSANTIGEN Detected (A) 07/18/2022    COVID19 NOT DETECTED 11/17/2020    FLUAAG Not Detected 07/18/2022    FLUBAG Not Detected 07/18/2022    BILIRUBINUR Negative 05/24/2021          Assessment and Plan    Diagnoses and all orders for this visit:    1. Essential hypertension (Primary)  Comments:  well controlled on regimen.   Orders:  -     Comprehensive Metabolic Panel  -     CBC & Differential    2. Mixed hyperlipidemia  Comments:  doing well on statin. check labs.   Orders:  -     Lipid Panel  -     Comprehensive Metabolic Panel    3. Hypothyroidism, unspecified type  Comments:  check TSH and adjust accoridngly.   Orders:  -     TSH    4. Need for pneumococcal vaccination  -     Pneumococcal  Conjugate Vaccine 20-Valent (PCV20)    5. Need for vaccination  -     Td Vaccine Greater Than or Equal To 8yo With Preservative IM    6. Nonrheumatic aortic valve stenosis  Comments:  echo pending. cont monitoring.           There are no discontinued medications.       Follow Up   Return in about 6 months (around 2/18/2023) for Recheck.  Patient was given instructions and counseling regarding his condition or for health maintenance advice. Please see specific information pulled into the AVS if appropriate.       Alex South Jr, MD  08/18/22  09:56 EDT

## 2022-09-10 DIAGNOSIS — E78.2 MIXED HYPERLIPIDEMIA: ICD-10-CM

## 2022-09-12 RX ORDER — SIMVASTATIN 40 MG
TABLET ORAL
Qty: 90 TABLET | Refills: 3 | Status: SHIPPED | OUTPATIENT
Start: 2022-09-12

## 2022-10-04 ENCOUNTER — TELEPHONE (OUTPATIENT)
Dept: INTERNAL MEDICINE | Facility: CLINIC | Age: 62
End: 2022-10-04

## 2022-10-04 NOTE — TELEPHONE ENCOUNTER
CALLED PATIENT AND COULD NOT LEAVE A MESSAGE MAILBOX WAS FULL.    HUB OKAY TO PLEASE RESCHEDULE 1ST AVAILABLE.

## 2022-10-26 ENCOUNTER — APPOINTMENT (OUTPATIENT)
Dept: CARDIOLOGY | Facility: HOSPITAL | Age: 62
End: 2022-10-26

## 2022-12-01 ENCOUNTER — APPOINTMENT (OUTPATIENT)
Dept: CARDIOLOGY | Facility: HOSPITAL | Age: 62
End: 2022-12-01

## 2023-01-12 ENCOUNTER — APPOINTMENT (OUTPATIENT)
Dept: CARDIOLOGY | Facility: HOSPITAL | Age: 63
End: 2023-01-12
Payer: COMMERCIAL

## 2023-01-12 DIAGNOSIS — I42.9 CARDIOMYOPATHY, UNSPECIFIED TYPE: Primary | ICD-10-CM

## 2023-01-16 RX ORDER — ASPIRIN 81 MG/1
TABLET, COATED ORAL
Qty: 90 TABLET | Refills: 3 | Status: SHIPPED | OUTPATIENT
Start: 2023-01-16

## 2023-02-22 RX ORDER — LEVOTHYROXINE SODIUM 0.15 MG/1
TABLET ORAL
Qty: 90 TABLET | Refills: 3 | Status: SHIPPED | OUTPATIENT
Start: 2023-02-22

## 2023-03-03 ENCOUNTER — APPOINTMENT (OUTPATIENT)
Dept: CT IMAGING | Facility: HOSPITAL | Age: 63
End: 2023-03-03
Payer: OTHER MISCELLANEOUS

## 2023-03-03 ENCOUNTER — APPOINTMENT (OUTPATIENT)
Dept: GENERAL RADIOLOGY | Facility: HOSPITAL | Age: 63
End: 2023-03-03
Payer: OTHER MISCELLANEOUS

## 2023-03-03 ENCOUNTER — HOSPITAL ENCOUNTER (EMERGENCY)
Facility: HOSPITAL | Age: 63
Discharge: HOME | End: 2023-03-03
Attending: EMERGENCY MEDICINE | Admitting: EMERGENCY MEDICINE
Payer: OTHER MISCELLANEOUS

## 2023-03-03 VITALS
HEART RATE: 102 BPM | OXYGEN SATURATION: 100 % | WEIGHT: 175 LBS | RESPIRATION RATE: 18 BRPM | HEIGHT: 68 IN | TEMPERATURE: 97.5 F | SYSTOLIC BLOOD PRESSURE: 174 MMHG | DIASTOLIC BLOOD PRESSURE: 80 MMHG | BODY MASS INDEX: 26.52 KG/M2

## 2023-03-03 DIAGNOSIS — S09.90XA TRAUMATIC INJURY OF HEAD, INITIAL ENCOUNTER: Primary | ICD-10-CM

## 2023-03-03 DIAGNOSIS — S00.01XA SCALP ABRASION, INITIAL ENCOUNTER: ICD-10-CM
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
## 2023-03-03 DIAGNOSIS — S06.0XAA CONCUSSION WITH UNKNOWN LOSS OF CONSCIOUSNESS STATUS, INITIAL ENCOUNTER: ICD-10-CM

## 2023-03-03 PROCEDURE — 99283 EMERGENCY DEPT VISIT LOW MDM: CPT

## 2023-03-03 PROCEDURE — 70450 CT HEAD/BRAIN W/O DYE: CPT

## 2023-03-03 PROCEDURE — 72125 CT NECK SPINE W/O DYE: CPT

## 2023-03-03 PROCEDURE — 72100 X-RAY EXAM L-S SPINE 2/3 VWS: CPT

## 2023-03-03 RX ORDER — ONDANSETRON 4 MG/1
4 TABLET, ORALLY DISINTEGRATING ORAL ONCE
Status: DISCONTINUED | OUTPATIENT
Start: 2023-03-03 | End: 2023-03-03 | Stop reason: HOSPADM

## 2023-03-03 RX ORDER — ORPHENADRINE CITRATE 100 MG/1
100 TABLET, EXTENDED RELEASE ORAL 2 TIMES DAILY
Qty: 20 TABLET | Refills: 0 | Status: CANCELLED | OUTPATIENT
Start: 2023-03-03

## 2023-03-03 RX ORDER — ONDANSETRON 4 MG/1
4 TABLET, ORALLY DISINTEGRATING ORAL 4 TIMES DAILY PRN
Qty: 15 TABLET | Refills: 0 | Status: CANCELLED | OUTPATIENT
Start: 2023-03-03

## 2023-03-04 NOTE — DISCHARGE INSTRUCTIONS
Rest, drink plenty of fluids.  Keep the area clean and dry.  Wash daily with antibacterial soap and pat dry.  Be mindful that this is an abrasion and it will bleed and ooze for the next few days until it scabs over.  You may have concussion symptoms such as headaches, dizziness, vision changes, fogginess over the next several weeks.  Take your meds as prescribed.  You may take over-the-counter acetaminophen and Motrin as needed for aches pains.  Call Work[employer]'s [employer]. Monday morning at 8 AM and follow-up with them as directed.  Follow-up with Dr. South on Monday or Tuesday for reevaluation and further treatment as necessary.  Speak with him about the lumbar spine x-ray results to see if he wants any follow-up done.  Return to the emergency department immediately for any acutely developing neurological symptoms, any persistent vomiting, any altered mental status, any bladder or bowel incontinence, or any new or worse concerns.
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
## 2023-03-14 ENCOUNTER — OFFICE VISIT (OUTPATIENT)
Dept: INTERNAL MEDICINE | Facility: CLINIC | Age: 63
End: 2023-03-14
Payer: COMMERCIAL

## 2023-03-14 VITALS
SYSTOLIC BLOOD PRESSURE: 157 MMHG | BODY MASS INDEX: 26.52 KG/M2 | TEMPERATURE: 97.5 F | HEART RATE: 71 BPM | WEIGHT: 175 LBS | OXYGEN SATURATION: 97 % | DIASTOLIC BLOOD PRESSURE: 85 MMHG | HEIGHT: 68 IN

## 2023-03-14 DIAGNOSIS — E03.9 ACQUIRED HYPOTHYROIDISM: ICD-10-CM

## 2023-03-14 DIAGNOSIS — R73.9 HYPERGLYCEMIA: ICD-10-CM

## 2023-03-14 DIAGNOSIS — R91.1 NODULE OF UPPER LOBE OF RIGHT LUNG: Primary | ICD-10-CM

## 2023-03-14 DIAGNOSIS — I10 ESSENTIAL HYPERTENSION: ICD-10-CM

## 2023-03-14 DIAGNOSIS — E78.2 MIXED HYPERLIPIDEMIA: ICD-10-CM

## 2023-03-14 PROCEDURE — 99214 OFFICE O/P EST MOD 30 MIN: CPT | Performed by: INTERNAL MEDICINE

## 2023-03-14 NOTE — PROGRESS NOTES
"Chief Complaint  Hypertension (Follow up ) and ER FOLLOW UP  (FALL )    Subjective          Juan Miguel Chairez presents to [facility]  History of Present Illness  hypertension- slighty higher. Patient is stressed over new imaging finding. Patient reports feeling rushed to appointment. Patient denies Has, dizziness, chest pain. Patient with home readings 120s/70s.   Patient had ER visit for a fall. He denies Has or residual symptoms. Incidentally, lung nodule was found.     Current Outpatient Medications   Medication Instructions   • Aspirin Low Dose 81 MG EC tablet TAKE ONE TABLET BY MOUTH DAILY   • levothyroxine (SYNTHROID, LEVOTHROID) 150 MCG tablet TAKE ONE TABLET BY MOUTH DAILY   • losartan (COZAAR) 100 MG tablet TAKE ONE TABLET BY MOUTH DAILY   • metoprolol tartrate (LOPRESSOR) 25 MG tablet TAKE ONE TABLET BY MOUTH TWICE A DAY   • simvastatin (ZOCOR) 40 MG tablet TAKE ONE TABLET BY MOUTH DAILY   • triamcinolone (KENALOG) 0.1 % cream No dose, route, or frequency recorded.       The following portions of the patient's history were reviewed and updated as appropriate: allergies, current medications, past family history, past medical history, past social history, past surgical history, and problem list.    Objective   Vital Signs:   /85 (BP Location: Left arm)   Pulse 71   Temp 97.5 °F (36.4 °C) (Temporal)   Ht 172.7 cm (68\")   Wt 79.4 kg (175 lb)   SpO2 97%   BMI 26.61 kg/m²     Wt Readings from Last 3 Encounters:   03/14/23 79.4 kg (175 lb)   03/03/23 79.4 kg (175 lb)   08/18/22 77.6 kg (171 lb)     BP Readings from Last 3 Encounters:   03/14/23 157/85   03/03/23 174/80   08/18/22 137/83     Physical Exam   Appearance: No acute distress, well-nourished  Head: normocephalic, atraumatic  Eyes: extraocular movements intact, no scleral icterus, no conjunctival injection  Ears, Nose, and Throat: external ears normal, nares patent, moist mucous " membranes  Cardiovascular: regular rate and rhythm. no murmurs, rubs, or gallops. no edema  Respiratory: breathing comfortably, symmetric chest rise, clear to auscultation bilaterally. No wheezes, rales, or rhonchi.  Neuro: alert and oriented to time, place, and person. Normal gait  Psych: normal mood and affect     Result Review :   The following data was reviewed by: Alex South Jr, MD on 03/14/2023:  Common labs    Common Labs 8/18/22 8/18/22 8/18/22    0931 0931 0931   Glucose   98   BUN   7 (A)   Creatinine   0.84   Sodium   138   Potassium   4.6   Chloride   103   Calcium   8.7   Albumin   4.10   Total Bilirubin   0.3   Alkaline Phosphatase   77   AST (SGOT)   33   ALT (SGPT)   21   WBC 8.26     Hemoglobin 12.6 (A)     Hematocrit 37.6     Platelets 347     Total Cholesterol  152    Triglycerides  64    HDL Cholesterol  53    LDL Cholesterol   86    (A) Abnormal value                Lab Results   Component Value Date    SARSANTIGEN Detected (A) 07/18/2022    COVID19 NOT DETECTED 11/17/2020    FLUAAG Not Detected 07/18/2022    FLUBAG Not Detected 07/18/2022    BILIRUBINUR Negative 05/24/2021          Assessment and Plan    Diagnoses and all orders for this visit:    1. Nodule of upper lobe of right lung (Primary)  Comments:  obtain chest CT to further eval.   Orders:  -     CT Chest With Contrast; Future    2. Acquired hypothyroidism  -     TSH; Future    3. Essential hypertension  Comments:  elevated today, but possibly due to stress. cont current regimen.  Orders:  -     CBC & Differential; Future  -     Comprehensive Metabolic Panel; Future    4. Mixed hyperlipidemia  -     Lipid Panel; Future    5. Hyperglycemia  -     Hemoglobin A1c; Future  -     Microalbumin / Creatinine Urine Ratio - Urine, Clean Catch; Future          There are no discontinued medications.       Follow Up   Return in about 6 months (around 9/14/2023) for Annual physical.  Patient was given instructions and counseling  regarding his condition or for health maintenance advice. Please see specific information pulled into the AVS if appropriate.       Alex South Jr, MD  03/14/23  13:50 EDT

## 2023-04-17 ENCOUNTER — CLINICAL SUPPORT (OUTPATIENT)
Dept: INTERNAL MEDICINE | Facility: CLINIC | Age: 63
End: 2023-04-17
Payer: COMMERCIAL

## 2023-04-17 DIAGNOSIS — E78.2 MIXED HYPERLIPIDEMIA: ICD-10-CM

## 2023-04-17 DIAGNOSIS — E03.9 ACQUIRED HYPOTHYROIDISM: ICD-10-CM

## 2023-04-17 DIAGNOSIS — I10 ESSENTIAL HYPERTENSION: ICD-10-CM

## 2023-04-17 DIAGNOSIS — R73.9 HYPERGLYCEMIA: ICD-10-CM

## 2023-04-17 LAB
ALBUMIN UR-MCNC: 5.3 MG/DL
BASOPHILS # BLD AUTO: 0.09 10*3/MM3 (ref 0–0.2)
BASOPHILS NFR BLD AUTO: 0.8 % (ref 0–1.5)
CREAT UR-MCNC: 22 MG/DL
DEPRECATED RDW RBC AUTO: 47.8 FL (ref 37–54)
EOSINOPHIL # BLD AUTO: 0.13 10*3/MM3 (ref 0–0.4)
EOSINOPHIL NFR BLD AUTO: 1.1 % (ref 0.3–6.2)
ERYTHROCYTE [DISTWIDTH] IN BLOOD BY AUTOMATED COUNT: 14.7 % (ref 12.3–15.4)
HBA1C MFR BLD: 5.3 % (ref 4.8–5.6)
HCT VFR BLD AUTO: 39 % (ref 37.5–51)
HGB BLD-MCNC: 13.3 G/DL (ref 13–17.7)
IMM GRANULOCYTES # BLD AUTO: 0.04 10*3/MM3 (ref 0–0.05)
IMM GRANULOCYTES NFR BLD AUTO: 0.3 % (ref 0–0.5)
LYMPHOCYTES # BLD AUTO: 2.41 10*3/MM3 (ref 0.7–3.1)
LYMPHOCYTES NFR BLD AUTO: 20.7 % (ref 19.6–45.3)
MCH RBC QN AUTO: 30.3 PG (ref 26.6–33)
MCHC RBC AUTO-ENTMCNC: 34.1 G/DL (ref 31.5–35.7)
MCV RBC AUTO: 88.8 FL (ref 79–97)
MICROALBUMIN/CREAT UR: 240.9 MG/G
MONOCYTES # BLD AUTO: 1.11 10*3/MM3 (ref 0.1–0.9)
MONOCYTES NFR BLD AUTO: 9.6 % (ref 5–12)
NEUTROPHILS NFR BLD AUTO: 67.5 % (ref 42.7–76)
NEUTROPHILS NFR BLD AUTO: 7.84 10*3/MM3 (ref 1.7–7)
NRBC BLD AUTO-RTO: 0.1 /100 WBC (ref 0–0.2)
PLATELET # BLD AUTO: 354 10*3/MM3 (ref 140–450)
PMV BLD AUTO: 11.9 FL (ref 6–12)
RBC # BLD AUTO: 4.39 10*6/MM3 (ref 4.14–5.8)
WBC NRBC COR # BLD: 11.62 10*3/MM3 (ref 3.4–10.8)

## 2023-04-17 PROCEDURE — 80061 LIPID PANEL: CPT | Performed by: INTERNAL MEDICINE

## 2023-04-17 PROCEDURE — 80050 GENERAL HEALTH PANEL: CPT | Performed by: INTERNAL MEDICINE

## 2023-04-17 PROCEDURE — 82043 UR ALBUMIN QUANTITATIVE: CPT | Performed by: INTERNAL MEDICINE

## 2023-04-17 PROCEDURE — 82570 ASSAY OF URINE CREATININE: CPT | Performed by: INTERNAL MEDICINE

## 2023-04-17 PROCEDURE — 83036 HEMOGLOBIN GLYCOSYLATED A1C: CPT | Performed by: INTERNAL MEDICINE

## 2023-04-18 LAB
ALBUMIN SERPL-MCNC: 4.2 G/DL (ref 3.5–5.2)
ALBUMIN/GLOB SERPL: 1.4 G/DL
ALP SERPL-CCNC: 126 U/L (ref 39–117)
ALT SERPL W P-5'-P-CCNC: 27 U/L (ref 1–41)
ANION GAP SERPL CALCULATED.3IONS-SCNC: 9 MMOL/L (ref 5–15)
AST SERPL-CCNC: 31 U/L (ref 1–40)
BILIRUB SERPL-MCNC: 0.4 MG/DL (ref 0–1.2)
BUN SERPL-MCNC: 8 MG/DL (ref 8–23)
BUN/CREAT SERPL: 9.9 (ref 7–25)
CALCIUM SPEC-SCNC: 8.9 MG/DL (ref 8.6–10.5)
CHLORIDE SERPL-SCNC: 103 MMOL/L (ref 98–107)
CHOLEST SERPL-MCNC: 170 MG/DL (ref 0–200)
CO2 SERPL-SCNC: 26 MMOL/L (ref 22–29)
CREAT SERPL-MCNC: 0.81 MG/DL (ref 0.76–1.27)
EGFRCR SERPLBLD CKD-EPI 2021: 100.9 ML/MIN/1.73
GLOBULIN UR ELPH-MCNC: 2.9 GM/DL
GLUCOSE SERPL-MCNC: 89 MG/DL (ref 65–99)
HDLC SERPL-MCNC: 57 MG/DL (ref 40–60)
LDLC SERPL CALC-MCNC: 98 MG/DL (ref 0–100)
LDLC/HDLC SERPL: 1.71 {RATIO}
POTASSIUM SERPL-SCNC: 4.4 MMOL/L (ref 3.5–5.2)
PROT SERPL-MCNC: 7.1 G/DL (ref 6–8.5)
SODIUM SERPL-SCNC: 138 MMOL/L (ref 136–145)
TRIGL SERPL-MCNC: 78 MG/DL (ref 0–150)
TSH SERPL DL<=0.05 MIU/L-ACNC: 1.42 UIU/ML (ref 0.27–4.2)
VLDLC SERPL-MCNC: 15 MG/DL (ref 5–40)

## 2023-04-20 ENCOUNTER — TELEPHONE (OUTPATIENT)
Dept: INTERNAL MEDICINE | Facility: CLINIC | Age: 63
End: 2023-04-20
Payer: COMMERCIAL

## 2023-04-20 NOTE — TELEPHONE ENCOUNTER
----- Message from Alex South Jr., MD sent at 4/20/2023  3:09 PM EDT -----  Labs overall look good. Some elevated protein in urine. Continue losartan to help prevent progression of kidney disease.

## 2023-04-27 ENCOUNTER — TELEPHONE (OUTPATIENT)
Dept: INTERNAL MEDICINE | Facility: CLINIC | Age: 63
End: 2023-04-27
Payer: COMMERCIAL

## 2023-04-27 DIAGNOSIS — I10 ESSENTIAL HYPERTENSION: ICD-10-CM

## 2023-04-27 RX ORDER — LOSARTAN POTASSIUM 100 MG/1
100 TABLET ORAL DAILY
Qty: 90 TABLET | Refills: 3 | Status: SHIPPED | OUTPATIENT
Start: 2023-04-27

## 2023-04-27 NOTE — TELEPHONE ENCOUNTER
"Caller: Juan Miguel Chairez \"Harjinder\"    Relationship: Self    Best call back number: [phone]    Requested Prescriptions:   Requested Prescriptions     Pending Prescriptions Disp Refills   • losartan (COZAAR) 100 MG tablet 90 tablet 3     Sig: Take 1 tablet by mouth Daily.        Pharmacy where request should be sent: [facility] [id number] [place], [place] - [address] AT [facility] [address] [facility] [phone] [facility] [phone] FX     Last office visit with prescribing clinician: 3/14/2023   Last telemedicine visit with prescribing clinician: 9/19/2023   Next office visit with prescribing clinician: 9/19/2023     Does the patient have less than a 3 day supply:  [x] Yes  [] No    Would you like a call back once the refill request has been completed: [] Yes [x] No    If the office needs to give you a call back, can they leave a voicemail: [] Yes [x] No    Lorin Sultana, PCT   04/27/23 10:08 EDT           "

## 2023-05-04 ENCOUNTER — TELEPHONE (OUTPATIENT)
Dept: INTERNAL MEDICINE | Facility: CLINIC | Age: 63
End: 2023-05-04
Payer: COMMERCIAL

## 2023-05-04 NOTE — TELEPHONE ENCOUNTER
Left message for patient to return call to clinic.    HUB TO READ:  ----- Message from Alex South Jr., MD sent at 5/4/2023  7:51 AM EDT -----  Stable lung nodules on CT. Would recommend chest CT in 1 year to monitor

## 2023-05-04 NOTE — TELEPHONE ENCOUNTER
----- Message from Alex South Jr., MD sent at 5/4/2023  7:51 AM EDT -----  Stable lung nodules on CT. Would recommend chest CT in 1 year to monitor.   ----- Message -----  From: Anne So  Sent: 5/3/2023   3:14 PM EDT  To: Alex South Jr., MD

## 2023-05-04 NOTE — TELEPHONE ENCOUNTER
"    Caller: Juan Miguel Chairez \"Harjinder\"    Relationship: Self    Best call back number: [phone]    What test was performed: CAT SCAN    Where was the test performed: [facility]    Additional notes: PATIENT IS REQUESTING RESULTS FROM CAT SCAN.   "

## 2023-09-06 DIAGNOSIS — E78.2 MIXED HYPERLIPIDEMIA: ICD-10-CM

## 2023-09-06 RX ORDER — SIMVASTATIN 40 MG
TABLET ORAL
Qty: 90 TABLET | Refills: 3 | Status: SHIPPED | OUTPATIENT
Start: 2023-09-06

## 2023-09-16 ENCOUNTER — DOCUMENTATION (OUTPATIENT)
Dept: INTERNAL MEDICINE | Facility: CLINIC | Age: 63
End: 2023-09-16
Payer: COMMERCIAL

## 2023-10-24 ENCOUNTER — OFFICE VISIT (OUTPATIENT)
Dept: INTERNAL MEDICINE | Facility: CLINIC | Age: 63
End: 2023-10-24
Payer: COMMERCIAL

## 2023-10-24 VITALS
HEIGHT: 68 IN | HEART RATE: 64 BPM | WEIGHT: 175 LBS | TEMPERATURE: 97.4 F | BODY MASS INDEX: 26.52 KG/M2 | SYSTOLIC BLOOD PRESSURE: 161 MMHG | DIASTOLIC BLOOD PRESSURE: 80 MMHG | OXYGEN SATURATION: 97 %

## 2023-10-24 DIAGNOSIS — I35.0 NONRHEUMATIC AORTIC VALVE STENOSIS: ICD-10-CM

## 2023-10-24 DIAGNOSIS — R91.1 NODULE OF UPPER LOBE OF RIGHT LUNG: ICD-10-CM

## 2023-10-24 DIAGNOSIS — Z29.11 NEED FOR RSV VACCINATION: ICD-10-CM

## 2023-10-24 DIAGNOSIS — Z12.5 PROSTATE CANCER SCREENING: ICD-10-CM

## 2023-10-24 DIAGNOSIS — E03.9 ACQUIRED HYPOTHYROIDISM: ICD-10-CM

## 2023-10-24 DIAGNOSIS — E78.2 MIXED HYPERLIPIDEMIA: ICD-10-CM

## 2023-10-24 DIAGNOSIS — Z23 NEED FOR COVID-19 VACCINE: ICD-10-CM

## 2023-10-24 DIAGNOSIS — R73.9 HYPERGLYCEMIA: ICD-10-CM

## 2023-10-24 DIAGNOSIS — I10 ESSENTIAL HYPERTENSION: Primary | ICD-10-CM

## 2023-10-24 LAB
ALBUMIN SERPL-MCNC: 3.9 G/DL (ref 3.5–5.2)
ALBUMIN/GLOB SERPL: 1.4 G/DL
ALP SERPL-CCNC: 144 U/L (ref 39–117)
ALT SERPL W P-5'-P-CCNC: 27 U/L (ref 1–41)
ANION GAP SERPL CALCULATED.3IONS-SCNC: 9 MMOL/L (ref 5–15)
AST SERPL-CCNC: 31 U/L (ref 1–40)
BASOPHILS # BLD AUTO: 0.09 10*3/MM3 (ref 0–0.2)
BASOPHILS NFR BLD AUTO: 1.1 % (ref 0–1.5)
BILIRUB SERPL-MCNC: 0.3 MG/DL (ref 0–1.2)
BUN SERPL-MCNC: 11 MG/DL (ref 8–23)
BUN/CREAT SERPL: 14.9 (ref 7–25)
CALCIUM SPEC-SCNC: 9 MG/DL (ref 8.6–10.5)
CHLORIDE SERPL-SCNC: 103 MMOL/L (ref 98–107)
CHOLEST SERPL-MCNC: 158 MG/DL (ref 0–200)
CO2 SERPL-SCNC: 26 MMOL/L (ref 22–29)
CREAT SERPL-MCNC: 0.74 MG/DL (ref 0.76–1.27)
DEPRECATED RDW RBC AUTO: 47.1 FL (ref 37–54)
EGFRCR SERPLBLD CKD-EPI 2021: 103.1 ML/MIN/1.73
EOSINOPHIL # BLD AUTO: 0.2 10*3/MM3 (ref 0–0.4)
EOSINOPHIL NFR BLD AUTO: 2.5 % (ref 0.3–6.2)
ERYTHROCYTE [DISTWIDTH] IN BLOOD BY AUTOMATED COUNT: 14.8 % (ref 12.3–15.4)
GLOBULIN UR ELPH-MCNC: 2.8 GM/DL
GLUCOSE SERPL-MCNC: 62 MG/DL (ref 65–99)
HBA1C MFR BLD: 5.5 % (ref 4.8–5.6)
HCT VFR BLD AUTO: 38.8 % (ref 37.5–51)
HDLC SERPL-MCNC: 49 MG/DL (ref 40–60)
HGB BLD-MCNC: 13.2 G/DL (ref 13–17.7)
IMM GRANULOCYTES # BLD AUTO: 0.03 10*3/MM3 (ref 0–0.05)
IMM GRANULOCYTES NFR BLD AUTO: 0.4 % (ref 0–0.5)
LDLC SERPL CALC-MCNC: 91 MG/DL (ref 0–100)
LDLC/HDLC SERPL: 1.84 {RATIO}
LYMPHOCYTES # BLD AUTO: 1.84 10*3/MM3 (ref 0.7–3.1)
LYMPHOCYTES NFR BLD AUTO: 22.8 % (ref 19.6–45.3)
MCH RBC QN AUTO: 29.9 PG (ref 26.6–33)
MCHC RBC AUTO-ENTMCNC: 34 G/DL (ref 31.5–35.7)
MCV RBC AUTO: 87.8 FL (ref 79–97)
MONOCYTES # BLD AUTO: 0.92 10*3/MM3 (ref 0.1–0.9)
MONOCYTES NFR BLD AUTO: 11.4 % (ref 5–12)
NEUTROPHILS NFR BLD AUTO: 5 10*3/MM3 (ref 1.7–7)
NEUTROPHILS NFR BLD AUTO: 61.8 % (ref 42.7–76)
NRBC BLD AUTO-RTO: 0 /100 WBC (ref 0–0.2)
PLATELET # BLD AUTO: 403 10*3/MM3 (ref 140–450)
PMV BLD AUTO: 12.2 FL (ref 6–12)
POTASSIUM SERPL-SCNC: 5.2 MMOL/L (ref 3.5–5.2)
PROT SERPL-MCNC: 6.7 G/DL (ref 6–8.5)
PSA SERPL-MCNC: 2.66 NG/ML (ref 0–4)
RBC # BLD AUTO: 4.42 10*6/MM3 (ref 4.14–5.8)
SODIUM SERPL-SCNC: 138 MMOL/L (ref 136–145)
TRIGL SERPL-MCNC: 95 MG/DL (ref 0–150)
TSH SERPL DL<=0.05 MIU/L-ACNC: 2.07 UIU/ML (ref 0.27–4.2)
VLDLC SERPL-MCNC: 18 MG/DL (ref 5–40)
WBC NRBC COR # BLD: 8.08 10*3/MM3 (ref 3.4–10.8)

## 2023-10-24 PROCEDURE — 80050 GENERAL HEALTH PANEL: CPT | Performed by: INTERNAL MEDICINE

## 2023-10-24 PROCEDURE — G0103 PSA SCREENING: HCPCS | Performed by: INTERNAL MEDICINE

## 2023-10-24 PROCEDURE — 83036 HEMOGLOBIN GLYCOSYLATED A1C: CPT | Performed by: INTERNAL MEDICINE

## 2023-10-24 PROCEDURE — 99214 OFFICE O/P EST MOD 30 MIN: CPT | Performed by: INTERNAL MEDICINE

## 2023-10-24 PROCEDURE — 80061 LIPID PANEL: CPT | Performed by: INTERNAL MEDICINE

## 2023-10-24 NOTE — PROGRESS NOTES
"Chief Complaint  Annual Exam    Subjective          Juan Miguel Chairez presents to [facility]  History of Present Illness  Hypertension- patient denies Headaches, dizziness, chest pain. Patient reports stress at work. Patient reports home Blood Pressure readings typically lower with 110s/70s.   Lung nodule- patient due for repeat chest CT in 4/2024.  Due for covid and RSV vaccine. Patient has had flu vaccine this season.       Current Outpatient Medications   Medication Instructions    Aspirin Low Dose 81 MG EC tablet TAKE ONE TABLET BY MOUTH DAILY    levothyroxine (SYNTHROID, LEVOTHROID) 150 MCG tablet TAKE ONE TABLET BY MOUTH DAILY    losartan (COZAAR) 100 mg, Oral, Daily    metoprolol tartrate (LOPRESSOR) 25 MG tablet TAKE ONE TABLET BY MOUTH TWICE A DAY    simvastatin (ZOCOR) 40 MG tablet TAKE ONE TABLET BY MOUTH DAILY    triamcinolone (KENALOG) 0.1 % cream No dose, route, or frequency recorded.       The following portions of the patient's history were reviewed and updated as appropriate: allergies, current medications, past family history, past medical history, past social history, past surgical history, and problem list.    Objective   Vital Signs:   /80 (BP Location: Right arm, Patient Position: Sitting, Cuff Size: Adult)   Pulse 64   Temp 97.4 °F (36.3 °C) (Temporal)   Ht 172.7 cm (68\")   Wt 79.4 kg (175 lb)   SpO2 97%   BMI 26.61 kg/m²     BP Readings from Last 3 Encounters:   10/24/23 161/80   03/14/23 157/85   03/03/23 174/80     Wt Readings from Last 3 Encounters:   10/24/23 79.4 kg (175 lb)   03/14/23 79.4 kg (175 lb)   03/03/23 79.4 kg (175 lb)       Physical Exam   Appearance: No acute distress, well-nourished  Head: normocephalic, atraumatic  Eyes: extraocular movements intact, no scleral icterus, no conjunctival injection  Ears, Nose, and Throat: external ears normal, nares patent, moist mucous membranes  Cardiovascular: regular rate and " rhythm. no murmurs, rubs, or gallops. no edema  Respiratory: breathing comfortably, symmetric chest rise, clear to auscultation bilaterally. No wheezes, rales, or rhonchi.  Neuro: alert and oriented to time, place, and person. Normal gait  Psych: normal mood and affect       Result Review :   The following data was reviewed by: Alex South Jr, MD on 10/24/2023:  Common labs          4/17/2023    13:48 10/24/2023    10:19   Common Labs   Glucose 89  62    BUN 8  11    Creatinine 0.81  0.74    Sodium 138  138    Potassium 4.4  5.2    Chloride 103  103    Calcium 8.9  9.0    Albumin 4.2  3.9    Total Bilirubin 0.4  0.3    Alkaline Phosphatase 126  144    AST (SGOT) 31  31    ALT (SGPT) 27  27    WBC 11.62  8.08    Hemoglobin 13.3  13.2    Hematocrit 39.0  38.8    Platelets 354  403    Total Cholesterol 170  158    Triglycerides 78  95    HDL Cholesterol 57  49    LDL Cholesterol  98  91    Hemoglobin A1C 5.30  5.50    Microalbumin, Urine 5.3     PSA  2.660             Lab Results   Component Value Date    SARSANTIGEN Detected (A) 07/18/2022    COVID19 NOT DETECTED 11/17/2020    FLUAAG Not Detected 07/18/2022    FLUBAG Not Detected 07/18/2022    BILIRUBINUR Negative 05/24/2021          Assessment and Plan    Diagnoses and all orders for this visit:    1. Essential hypertension (Primary)  Comments:  cont monitoring goal BP <130/80.  Orders:  -     CBC & Differential  -     Comprehensive Metabolic Panel    2. Mixed hyperlipidemia  -     Lipid Panel    3. Acquired hypothyroidism  -     TSH    4. Nodule of upper lobe of right lung  -     CT Chest With Contrast; Future    5. Need for RSV vaccination    6. Need for COVID-19 vaccine    7. Hyperglycemia  -     Hemoglobin A1c    8. Prostate cancer screening  -     PSA Screen    9. Nonrheumatic aortic valve stenosis  -     Adult Transthoracic Echo Complete W/ Cont if Necessary Per Protocol; Future        There are no discontinued medications.       Follow Up   Return  in about 6 months (around 4/24/2024) for Recheck, HTN.  Patient was given instructions and counseling regarding his condition or for health maintenance advice. Please see specific information pulled into the AVS if appropriate.       Alex South Jr, MD  10/25/23  12:45 EDT

## 2023-11-12 DIAGNOSIS — E78.2 MIXED HYPERLIPIDEMIA: ICD-10-CM

## 2023-11-12 DIAGNOSIS — I10 ESSENTIAL HYPERTENSION: ICD-10-CM

## 2023-11-13 RX ORDER — LEVOTHYROXINE SODIUM 0.15 MG/1
150 TABLET ORAL DAILY
Qty: 90 TABLET | Refills: 3 | Status: SHIPPED | OUTPATIENT
Start: 2023-11-13

## 2023-11-13 RX ORDER — LOSARTAN POTASSIUM 100 MG/1
100 TABLET ORAL DAILY
Qty: 90 TABLET | Refills: 3 | Status: SHIPPED | OUTPATIENT
Start: 2023-11-13

## 2023-11-13 RX ORDER — SIMVASTATIN 40 MG
40 TABLET ORAL DAILY
Qty: 90 TABLET | Refills: 3 | Status: SHIPPED | OUTPATIENT
Start: 2023-11-13

## 2023-11-13 NOTE — TELEPHONE ENCOUNTER
"Caller: Juan Miguel Chairez \"Harjinder\"    Relationship: Self    Best call back number: [phone]     Requested Prescriptions:   Requested Prescriptions     Pending Prescriptions Disp Refills    metoprolol tartrate (LOPRESSOR) 25 MG tablet 180 tablet 2     Sig: Take 1 tablet by mouth 2 (Two) Times a Day.        Pharmacy where request should be sent: [facility] [id number] [facility] [address] AT [facility] [address] & [facility] [phone] [facility] [phone] FX     Last office visit with prescribing clinician: 10/24/2023   Last telemedicine visit with prescribing clinician: Visit date not found   Next office visit with prescribing clinician: 4/25/2024     Additional details provided by patient: [place] EARLY WEDNESDAY MORNING. HAS 2 PILLS LEFT    Does the patient have less than a 3 day supply:  [x] Yes  [] No    Would you like a call back once the refill request has been completed: [] Yes [] No    If the office needs to give you a call back, can they leave a voicemail: [] Yes [] No    Jess De La Rosa Rep   11/13/23 10:57 EST           "

## 2023-12-18 ENCOUNTER — HOSPITAL ENCOUNTER (OUTPATIENT)
Dept: CARDIOLOGY | Facility: HOSPITAL | Age: 63
Discharge: HOME | End: 2023-12-18
Admitting: INTERNAL MEDICINE
Payer: COMMERCIAL

## 2023-12-18 DIAGNOSIS — I35.0 NONRHEUMATIC AORTIC VALVE STENOSIS: ICD-10-CM

## 2023-12-18 LAB
BH CV ECHO MEAS - AO MAX PG: 38 MMHG
BH CV ECHO MEAS - AO MEAN PG: 20 MMHG
BH CV ECHO MEAS - AO ROOT DIAM: 2.9 CM
BH CV ECHO MEAS - AO V2 MAX: 307 CM/SEC
BH CV ECHO MEAS - AO V2 VTI: 59.5 CM
BH CV ECHO MEAS - AVA(I,D): 0.39 CM2
BH CV ECHO MEAS - EDV(CUBED): 117.6 ML
BH CV ECHO MEAS - EDV(MOD-SP2): 104 ML
BH CV ECHO MEAS - EDV(MOD-SP4): 96.4 ML
BH CV ECHO MEAS - EF(MOD-BP): 50.1 %
BH CV ECHO MEAS - EF(MOD-SP2): 52.3 %
BH CV ECHO MEAS - EF(MOD-SP4): 50.8 %
BH CV ECHO MEAS - ESV(CUBED): 46.7 ML
BH CV ECHO MEAS - ESV(MOD-SP2): 49.6 ML
BH CV ECHO MEAS - ESV(MOD-SP4): 47.4 ML
BH CV ECHO MEAS - FS: 26.5 %
BH CV ECHO MEAS - IVS/LVPW: 1.58 CM
BH CV ECHO MEAS - IVSD: 1.9 CM
BH CV ECHO MEAS - LA DIMENSION: 4.3 CM
BH CV ECHO MEAS - LV MASS(C)D: 328.7 GRAMS
BH CV ECHO MEAS - LV MAX PG: 2.9 MMHG
BH CV ECHO MEAS - LV MEAN PG: 1 MMHG
BH CV ECHO MEAS - LV V1 MAX: 84.5 CM/SEC
BH CV ECHO MEAS - LV V1 VTI: 15.1 CM
BH CV ECHO MEAS - LVIDD: 4.9 CM
BH CV ECHO MEAS - LVIDS: 3.6 CM
BH CV ECHO MEAS - LVOT AREA: 1.54 CM2
BH CV ECHO MEAS - LVOT DIAM: 1.4 CM
BH CV ECHO MEAS - LVPWD: 1.2 CM
BH CV ECHO MEAS - MR MAX PG: 144.1 MMHG
BH CV ECHO MEAS - MR MAX VEL: 600 CM/SEC
BH CV ECHO MEAS - MV A MAX VEL: 196 CM/SEC
BH CV ECHO MEAS - MV DEC SLOPE: 889 CM/SEC2
BH CV ECHO MEAS - MV DEC TIME: 0.28 SEC
BH CV ECHO MEAS - MV E MAX VEL: 284 CM/SEC
BH CV ECHO MEAS - MV E/A: 1.45
BH CV ECHO MEAS - MV P1/2T: 91.9 MSEC
BH CV ECHO MEAS - MVA(P1/2T): 2.39 CM2
BH CV ECHO MEAS - RAP SYSTOLE: 3 MMHG
BH CV ECHO MEAS - RVDD: 2.7 CM
BH CV ECHO MEAS - RVSP: 50 MMHG
BH CV ECHO MEAS - SV(LVOT): 23.2 ML
BH CV ECHO MEAS - SV(MOD-SP2): 54.4 ML
BH CV ECHO MEAS - SV(MOD-SP4): 49 ML
BH CV ECHO MEAS - TR MAX PG: 46.8 MMHG
BH CV ECHO MEAS - TR MAX VEL: 342 CM/SEC
LEFT ATRIUM VOLUME INDEX: 27.8 ML/M2
LEFT ATRIUM VOLUME: 54 ML

## 2023-12-18 PROCEDURE — 93306 TTE W/DOPPLER COMPLETE: CPT

## 2023-12-21 ENCOUNTER — TELEPHONE (OUTPATIENT)
Dept: INTERNAL MEDICINE | Facility: CLINIC | Age: 63
End: 2023-12-21
Payer: COMMERCIAL

## 2023-12-21 ENCOUNTER — TELEPHONE (OUTPATIENT)
Dept: CARDIOLOGY | Facility: CLINIC | Age: 63
End: 2023-12-21

## 2023-12-21 NOTE — TELEPHONE ENCOUNTER
Patient called back and wants to know if his Echo has improved or declined from the previous one. Informed patient that it states it is stable, but patient wants to know more about it.

## 2023-12-21 NOTE — TELEPHONE ENCOUNTER
PATIENT DOES NOT WANT TO WAIT TIL AFTER THE HOLIDAYS NOW. HE WOULD LIKE DR ELWIS TO please JUST CALL HIM TODAY OR TOMORROW HE WOULD REALLY APPRECIATE IT. HE SAID THAT HE FEELS FINE BUT WE WOULD LIKE MORE DETAIL ABOUT THIS MOST RECENT ECHO.

## 2023-12-21 NOTE — TELEPHONE ENCOUNTER
"    Caller: Juan Miguel Chairez \"Harjinder\"    Relationship: Self    Best call back number: [phone]    Caller requesting test results: PATIENT    What test was performed: ECHO    When was the test performed: 12.18.23, 11.2.2020    Where was the test performed: [facility]    Additional notes: PATIENT WOULD LIKE DR LEWIS'S OPINION ON THE RESULTS FOR THE ECHO. PATIENT WOULD LIKE THE KNOW WHAT THE RESULTS FROM THE 12.18.23 ECHO COMPARE TO THE ECHO DONE ON 11.2.2020. PATIENT STATES THIS INFO CAN BE SHARED OVER A PHONE CALL OR PATIENT IS HAPPY TO DO A TELEHEALTH VISIT AFTER THE HOLIDAYS.         "

## 2023-12-21 NOTE — TELEPHONE ENCOUNTER
----- Message from Alex South Jr., MD sent at 12/18/2023  4:00 PM EST -----  Echo appears stable. Would recommend follow-up with Dr. Mclain, cardiology to discuss more.

## 2023-12-21 NOTE — TELEPHONE ENCOUNTER
It does look about the same. Maybe slightly better, but also may be within the standard deviation of measurements and not a significant change.

## 2023-12-22 ENCOUNTER — TELEMEDICINE (OUTPATIENT)
Dept: CARDIOLOGY | Facility: CLINIC | Age: 63
End: 2023-12-22
Payer: COMMERCIAL

## 2023-12-22 VITALS — WEIGHT: 175 LBS | BODY MASS INDEX: 26.52 KG/M2 | HEIGHT: 68 IN

## 2023-12-22 DIAGNOSIS — E78.2 MIXED HYPERLIPIDEMIA: ICD-10-CM

## 2023-12-22 DIAGNOSIS — Z71.2 ENCOUNTER TO DISCUSS TEST RESULTS: Primary | ICD-10-CM

## 2023-12-22 DIAGNOSIS — I35.0 NONRHEUMATIC AORTIC VALVE STENOSIS: ICD-10-CM

## 2023-12-22 DIAGNOSIS — I25.10 CORONARY ARTERY DISEASE INVOLVING NATIVE CORONARY ARTERY OF NATIVE HEART WITHOUT ANGINA PECTORIS: ICD-10-CM

## 2023-12-22 NOTE — TELEPHONE ENCOUNTER
Informed patient, patient stated that he called Cardiology and spoke with PA and Dr. Mclain. Has been about 15 years since having a JOSE ANTONIO. Stated that he is going to discuss having one of these done around the beginning of the year.     No further questions or concerns noted.

## 2024-01-05 DIAGNOSIS — I10 PRIMARY HYPERTENSION: Primary | ICD-10-CM

## 2024-01-09 ENCOUNTER — HOSPITAL ENCOUNTER (OUTPATIENT)
Dept: CT IMAGING | Facility: HOSPITAL | Age: 64
Discharge: HOME | End: 2024-01-09
Payer: COMMERCIAL

## 2024-01-09 ENCOUNTER — HOSPITAL ENCOUNTER (OUTPATIENT)
Dept: CARDIOLOGY | Facility: HOSPITAL | Age: 64
Discharge: HOME | End: 2024-01-09
Payer: COMMERCIAL

## 2024-01-09 ENCOUNTER — ANESTHESIA (OUTPATIENT)
Dept: CARDIOLOGY | Facility: HOSPITAL | Age: 64
End: 2024-01-09
Payer: COMMERCIAL

## 2024-01-09 ENCOUNTER — HOSPITAL ENCOUNTER (OUTPATIENT)
Dept: RESPIRATORY THERAPY | Facility: HOSPITAL | Age: 64
Discharge: HOME | End: 2024-01-09
Payer: COMMERCIAL

## 2024-01-09 ENCOUNTER — ANESTHESIA EVENT (OUTPATIENT)
Dept: CARDIOLOGY | Facility: HOSPITAL | Age: 64
End: 2024-01-09
Payer: COMMERCIAL

## 2024-01-09 ENCOUNTER — LAB (OUTPATIENT)
Dept: LAB | Facility: HOSPITAL | Age: 64
End: 2024-01-09
Payer: COMMERCIAL

## 2024-01-09 VITALS
HEIGHT: 66 IN | OXYGEN SATURATION: 100 % | HEART RATE: 69 BPM | BODY MASS INDEX: 31.6 KG/M2 | WEIGHT: 196.65 LBS | SYSTOLIC BLOOD PRESSURE: 122 MMHG | TEMPERATURE: 97.7 F | RESPIRATION RATE: 12 BRPM | DIASTOLIC BLOOD PRESSURE: 70 MMHG

## 2024-01-09 DIAGNOSIS — I35.1 AORTIC VALVE INSUFFICIENCY, ETIOLOGY OF CARDIAC VALVE DISEASE UNSPECIFIED: Primary | ICD-10-CM

## 2024-01-09 DIAGNOSIS — I35.1 AORTIC VALVE INSUFFICIENCY, ETIOLOGY OF CARDIAC VALVE DISEASE UNSPECIFIED: ICD-10-CM

## 2024-01-09 DIAGNOSIS — I35.0 NONRHEUMATIC AORTIC VALVE STENOSIS: ICD-10-CM

## 2024-01-09 DIAGNOSIS — I10 PRIMARY HYPERTENSION: ICD-10-CM

## 2024-01-09 LAB
ANION GAP SERPL CALCULATED.3IONS-SCNC: 9 MMOL/L (ref 5–15)
APTT PPP: 29.7 SECONDS (ref 24–31)
BASOPHILS # BLD AUTO: 0.1 10*3/MM3 (ref 0–0.2)
BASOPHILS NFR BLD AUTO: 1.6 % (ref 0–1.5)
BUN SERPL-MCNC: 9 MG/DL (ref 8–23)
BUN/CREAT SERPL: 12 (ref 7–25)
CALCIUM SPEC-SCNC: 8.7 MG/DL (ref 8.6–10.5)
CHLORIDE SERPL-SCNC: 101 MMOL/L (ref 98–107)
CO2 SERPL-SCNC: 29 MMOL/L (ref 22–29)
CREAT SERPL-MCNC: 0.75 MG/DL (ref 0.76–1.27)
DEPRECATED RDW RBC AUTO: 52.5 FL (ref 37–54)
EGFRCR SERPLBLD CKD-EPI 2021: 102.7 ML/MIN/1.73
EOSINOPHIL # BLD AUTO: 0.2 10*3/MM3 (ref 0–0.4)
EOSINOPHIL NFR BLD AUTO: 2.5 % (ref 0.3–6.2)
ERYTHROCYTE [DISTWIDTH] IN BLOOD BY AUTOMATED COUNT: 15.9 % (ref 12.3–15.4)
GLUCOSE SERPL-MCNC: 85 MG/DL (ref 65–99)
HCT VFR BLD AUTO: 40.7 % (ref 37.5–51)
HGB BLD-MCNC: 13.5 G/DL (ref 13–17.7)
INR PPP: 0.99 (ref 0.93–1.1)
LYMPHOCYTES # BLD AUTO: 2.2 10*3/MM3 (ref 0.7–3.1)
LYMPHOCYTES NFR BLD AUTO: 27.7 % (ref 19.6–45.3)
MCH RBC QN AUTO: 29.7 PG (ref 26.6–33)
MCHC RBC AUTO-ENTMCNC: 33.1 G/DL (ref 31.5–35.7)
MCV RBC AUTO: 89.9 FL (ref 79–97)
MONOCYTES # BLD AUTO: 1 10*3/MM3 (ref 0.1–0.9)
MONOCYTES NFR BLD AUTO: 13 % (ref 5–12)
NEUTROPHILS NFR BLD AUTO: 4.4 10*3/MM3 (ref 1.7–7)
NEUTROPHILS NFR BLD AUTO: 55.2 % (ref 42.7–76)
NRBC BLD AUTO-RTO: 0.1 /100 WBC (ref 0–0.2)
PLATELET # BLD AUTO: 310 10*3/MM3 (ref 140–450)
PMV BLD AUTO: 9.9 FL (ref 6–12)
POTASSIUM SERPL-SCNC: 4.5 MMOL/L (ref 3.5–5.2)
PROTHROMBIN TIME: 10.8 SECONDS (ref 9.6–11.7)
RBC # BLD AUTO: 4.53 10*6/MM3 (ref 4.14–5.8)
SODIUM SERPL-SCNC: 139 MMOL/L (ref 136–145)
WBC NRBC COR # BLD AUTO: 8 10*3/MM3 (ref 3.4–10.8)

## 2024-01-09 PROCEDURE — 93312 ECHO TRANSESOPHAGEAL: CPT

## 2024-01-09 PROCEDURE — 36415 COLL VENOUS BLD VENIPUNCTURE: CPT

## 2024-01-09 PROCEDURE — 85730 THROMBOPLASTIN TIME PARTIAL: CPT

## 2024-01-09 PROCEDURE — 94729 DIFFUSING CAPACITY: CPT

## 2024-01-09 PROCEDURE — 93325 DOPPLER ECHO COLOR FLOW MAPG: CPT

## 2024-01-09 PROCEDURE — 25010000002 PROPOFOL 200 MG/20ML EMULSION: Performed by: NURSE ANESTHETIST, CERTIFIED REGISTERED

## 2024-01-09 PROCEDURE — 85610 PROTHROMBIN TIME: CPT

## 2024-01-09 PROCEDURE — 93320 DOPPLER ECHO COMPLETE: CPT

## 2024-01-09 PROCEDURE — 85025 COMPLETE CBC W/AUTO DIFF WBC: CPT

## 2024-01-09 PROCEDURE — 94010 BREATHING CAPACITY TEST: CPT

## 2024-01-09 PROCEDURE — 80048 BASIC METABOLIC PNL TOTAL CA: CPT

## 2024-01-09 PROCEDURE — 25810000003 SODIUM CHLORIDE 0.9 % SOLUTION: Performed by: NURSE ANESTHETIST, CERTIFIED REGISTERED

## 2024-01-09 PROCEDURE — 94726 PLETHYSMOGRAPHY LUNG VOLUMES: CPT

## 2024-01-09 RX ORDER — SODIUM CHLORIDE 9 MG/ML
INJECTION, SOLUTION INTRAVENOUS CONTINUOUS PRN
Status: DISCONTINUED | OUTPATIENT
Start: 2024-01-09 | End: 2024-01-09 | Stop reason: SURG

## 2024-01-09 RX ORDER — PROPOFOL 10 MG/ML
INJECTION, EMULSION INTRAVENOUS AS NEEDED
Status: DISCONTINUED | OUTPATIENT
Start: 2024-01-09 | End: 2024-01-09 | Stop reason: SURG

## 2024-01-09 RX ORDER — LIDOCAINE HYDROCHLORIDE 20 MG/ML
INJECTION, SOLUTION EPIDURAL; INFILTRATION; INTRACAUDAL; PERINEURAL AS NEEDED
Status: DISCONTINUED | OUTPATIENT
Start: 2024-01-09 | End: 2024-01-09 | Stop reason: SURG

## 2024-01-09 RX ADMIN — PROPOFOL 150 MG: 10 INJECTION, EMULSION INTRAVENOUS at 13:01

## 2024-01-09 RX ADMIN — PROPOFOL 50 MG: 10 INJECTION, EMULSION INTRAVENOUS at 13:19

## 2024-01-09 RX ADMIN — PROPOFOL 50 MG: 10 INJECTION, EMULSION INTRAVENOUS at 13:26

## 2024-01-09 RX ADMIN — LIDOCAINE HYDROCHLORIDE 60 MG: 20 INJECTION, SOLUTION EPIDURAL; INFILTRATION; INTRACAUDAL; PERINEURAL at 13:01

## 2024-01-09 RX ADMIN — PROPOFOL 50 MG: 10 INJECTION, EMULSION INTRAVENOUS at 13:13

## 2024-01-09 RX ADMIN — PROPOFOL 50 MG: 10 INJECTION, EMULSION INTRAVENOUS at 13:06

## 2024-01-09 RX ADMIN — SODIUM CHLORIDE: 9 INJECTION, SOLUTION INTRAVENOUS at 12:47

## 2024-01-09 NOTE — PROGRESS NOTES
Asked by Dr. Mclain to get pt set up in clinic with Dr. Donaldson.  He came in for an elective JOSE ANTONIO today.  JOSE ANTONIO showed severe AS, moderate MR.    He is s/p aortic root/ CABG x2 per Dr. Moise at [facility] in 2011.    Additional PMHx includes Hodgkin's lymphoma with radiation in 1984, splenectomy 1983, HTN, HLD, DM type 2, and hypothyroidism.    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    CT chest without contrast and PFTs ordered today.    Follow-up appt with Dr. Donaldson is 1/18/2024 at 11 am.

## 2024-01-09 NOTE — ANESTHESIA PREPROCEDURE EVALUATION
Anesthesia Evaluation     NPO Solid Status: > 8 hours  NPO Liquid Status: > 8 hours           Airway   Mallampati: II  TM distance: >3 FB  Neck ROM: full  No difficulty expected  Dental - normal exam     Pulmonary - normal exam   Cardiovascular - normal exam    (+) hypertension well controlled, valvular problems/murmurs AS, past MI , CAD, CABG, hyperlipidemia    ROS comment: EF 50%    Fibrocalcific mitral and aortic valves.  Left ventricular hypertrophy with normal left ventricular systolic function.  Severe aortic stenosis with a mean gradient of 20 mm.  Probably underestimated.  Previous mean gradient was high at 30 mm.  Mild aortic regurgitation.  Mild MR and mild mitral stenosis.  Mild TR with pulmonary hypertension      Neuro/Psych  GI/Hepatic/Renal/Endo    (+) diabetes mellitus, thyroid problem hypothyroidism    Musculoskeletal     Abdominal  - normal exam    Bowel sounds: normal.   Substance History      OB/GYN          Other      history of cancer                  Anesthesia Plan    ASA 3     MAC   total IV anesthesia  intravenous induction     Anesthetic plan, risks, benefits, and alternatives have been provided, discussed and informed consent has been obtained with: patient.  Pre-procedure education provided  Plan discussed with CRNA.    CODE STATUS:

## 2024-01-09 NOTE — ANESTHESIA POSTPROCEDURE EVALUATION
Patient: Juan Miguel Chairez    Procedure Summary       Date: 01/09/24 Room / Location: [facility]    Anesthesia Start: 1257 Anesthesia Stop:     Procedure: ADULT TRANSESOPHAGEAL ECHO (JOSE ANTONIO) W/ CONT IF NECESSARY PER PROTOCOL Diagnosis:       Nonrheumatic aortic valve stenosis      (Valvular Disease)      (Aortic Valve Assessment)    Scheduled Providers: Froy Mclain MD Provider: Mary Ellen Lopez CRNA    Anesthesia Type: MAC ASA Status: 3            Anesthesia Type: MAC    Vitals  No vitals data found for the desired time range.          Post Anesthesia Care and Evaluation    Patient location during evaluation: PACU  Patient participation: complete - patient participated  Level of consciousness: awake  Pain scale: See nurse's notes for pain score.  Pain management: adequate    Airway patency: patent  Anesthetic complications: No anesthetic complications  PONV Status: none  Cardiovascular status: acceptable  Respiratory status: acceptable and spontaneous ventilation  Hydration status: acceptable    Comments: Patient seen and examined postoperatively; vital signs stable; SpO2 greater than or equal to 90%; cardiopulmonary status stable; nausea/vomiting adequately controlled; pain adequately controlled; no apparent anesthesia complications; patient discharged from anesthesia care when discharge criteria were met

## 2024-01-09 NOTE — H&P
Referring Provider: Dr. Christ feliciano    Attending: No att. providers found    Reason for Consultation:    Aortic stenosis  CAD  CABG  Hypertension  Cardiomyopathy    Chief complaint:    Shortness of breath       History of present illness:     Patient is 63 years old white gentleman whose past medical history is significant for hypertension, hyperlipidemia, diabetes mellitus, coronary artery disease, status post coronary artery bypass graft, cardiomyopathy, who came today for elective transesophageal echocardiographic.    Patient has moderate aortic stenosis.  Recently had echocardiogram which showed severe aortic stenosis.  Patient is referred for JOSE ANTONIO to further evaluate for aortic stenosis and aortic valve.    Patient does have shortness of breath.  Patient denies any chest pain.  Patient denies any orthopnea PND no syncope or presyncope.     In 2010, patient had cardiac workup for his symptom of chest pain.  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  Patient underwent cardiac catheterization which showed 100% lad stenosis, 25% LCX stenosis, RCA had 90% stenosis.  Patient underwent  left ventricle aneurysmectomy and CABG. In December 2017, patient had echocardiogram which showed EF of 55% and akinetic distal anterior wall.  Mild aortic stenosis noted.     In April 2021, patient underwent echocardiogram which showed EF of 50 to 55%.  Severe aortic stenosis noted with aortic valve area of 0.83 cm² and peak velocity of 3.5 m/s noted.  Peak gradient was 50 mmHg and mean gradient was 30.4 mmHg.    Review of Systems  Review of Systems   Constitutional: Negative for chills and fever.   HENT:  Negative for ear discharge and nosebleeds.    Eyes:  Negative for discharge and redness.   Cardiovascular:  Positive for chest pain. Negative for orthopnea, palpitations, paroxysmal nocturnal dyspnea and syncope.   Respiratory:  Positive for shortness of breath. Negative for cough and wheezing.   "  Endocrine: Negative for heat intolerance.   Skin:  Negative for rash.   Musculoskeletal:  Negative for arthritis and myalgias.   Gastrointestinal:  Negative for abdominal pain, melena, nausea and vomiting.   Genitourinary:  Negative for dysuria and hematuria.   Neurological:  Negative for dizziness, light-headedness, numbness and tremors.   Psychiatric/Behavioral:  Negative for depression. The patient is not nervous/anxious.        Past Medical History  Past Medical History:   Diagnosis Date    Benign essential HTN     CAD (coronary artery disease) of artery bypass graft     Cardiomyopathy     Coronary artery disease     DM2 (diabetes mellitus, type 2)     Hodgkin's lymphoma     Malignant    Hyperlipidemia, mixed     Hypothyroidism     Type 2 diabetes mellitus 10/15/2020    and   Past Surgical History:   Procedure Laterality Date    ABDOMINAL AORTIC ANEURYSM REPAIR      CARDIAC CATHETERIZATION      COLONOSCOPY      [facility]    CORONARY ARTERY BYPASS GRAFT  2010    SPLENECTOMY         Family History  Family History   Problem Relation Age of Onset    Stroke Father        Social History  Social History     Socioeconomic History    Marital status:    Tobacco Use    Smoking status: Former     Types: Cigarettes     Quit date:      Years since quittin.0    Smokeless tobacco: Former    Tobacco comments:     less than 1ppd   Vaping Use    Vaping Use: Never used   Substance and Sexual Activity    Alcohol use: Yes     Comment: current some day drinks rarely    Drug use: Never    Sexual activity: Defer       Objective     Physical Exam:    Vital Signs  Vitals:    24 1134   BP: 121/78   BP Location: Right arm   Patient Position: Lying   Pulse: 70   Resp: 18   Temp: 97.7 °F (36.5 °C)   TempSrc: Oral   SpO2: 100%   Weight: 89.2 kg (196 lb 10.4 oz)   Height: 167.6 cm (66\")       Weight  Flowsheet Rows      Flowsheet Row First Filed Value   Admission Height 167.6 cm (66\") Documented at " 01/09/2024 1134   Admission Weight 89.2 kg (196 lb 10.4 oz) Documented at 01/09/2024 1134             Constitutional:       Appearance: Well-developed.   Eyes:      General: No scleral icterus.        Right eye: No discharge.   HENT:      Head: Normocephalic and atraumatic.   Neck:      Thyroid: No thyromegaly.      Lymphadenopathy: No cervical adenopathy.   Pulmonary:      Effort: Pulmonary effort is normal. No respiratory distress.      Breath sounds: Normal breath sounds. No wheezing. No rales.   Cardiovascular:      Normal rate. Regular rhythm.      Murmurs: There is a systolic murmur.      No gallop.    Edema:     Peripheral edema absent.   Abdominal:      Tenderness: There is no abdominal tenderness.   Skin:     Findings: No erythema or rash.   Neurological:      Mental Status: Alert and oriented to person, place, and time.         Results Review:  Lab Results (last 24 hours)       ** No results found for the last 24 hours. **          Imaging Results (Last 72 Hours)       ** No results found for the last 72 hours. **          No orders to display     CBC    Results from last 7 days   Lab Units 01/09/24  1049   WBC 10*3/mm3 8.00   HEMOGLOBIN g/dL 13.5   PLATELETS 10*3/mm3 310     BMP   Results from last 7 days   Lab Units 01/09/24  1049   SODIUM mmol/L 139   POTASSIUM mmol/L 4.5   CHLORIDE mmol/L 101   CO2 mmol/L 29.0   BUN mg/dL 9   CREATININE mg/dL 0.75*   GLUCOSE mg/dL 85     CMP   Results from last 7 days   Lab Units 01/09/24  1049   SODIUM mmol/L 139   POTASSIUM mmol/L 4.5   CHLORIDE mmol/L 101   CO2 mmol/L 29.0   BUN mg/dL 9   CREATININE mg/dL 0.75*   GLUCOSE mg/dL 85     Medication Review  Scheduled Meds:  Continuous Infusions:No current facility-administered medications for this encounter.    PRN Meds:.    Assessment:      * No active hospital problems. *        Plan:    MDM:    1.  Aortic stenosis:    Patient is noted to have severe aortic stenosis.  Proceed with JOSE ANTONIO.  Patient may need cardiac  catheterization    2.  CAD/CABG:    Patient has previous history of CABG.  Currently patient is relatively asymptomatic    3.  History of LV aneurysm s/p aneurysmectomy.    Recent echocardiogram showed EF of 50%.  Recommend observation    I discussed the patient's findings and my recommendations with patient    Froy Mclain MD  01/09/24  12:48 EST

## 2024-01-09 NOTE — PROGRESS NOTES
CARDIOLOGY   TELE-VISIT        You have chosen to receive care through a telephone visit today. Do you consent to use a telephone visit for your medical care today? Yes        Primary Care Provider:   Alex South Jr., MD        Reason for follow up:    Discuss ECHO results      Subjective       CC:    discuss ECHO Results    History of Present Illness    Juan Miguel Chairez is a 63 y.o. male who is a patient of Dr. Mclain. Pmh includes hypertension, hyperlipidemia, diabetes mellitus, coronary artery disease, status post coronary artery bypass graft, cardiomyopathy.    Mr. Chairez has been followed for aortic stenosis. He most recently underwent ECHO 12/2023 showing Fibrocalcific mitral and aortic valves. Left ventricular hypertrophy with normal left ventricular systolic function. Severe aortic stenosis with a mean gradient of 20 mm.  Probably underestimated.  Previous mean gradient was high at 30 mm. Mild AI, mild MR, mild MS, mild TR with pulmonary HTN.     We discussed results of ECHO today. 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 He does occasionally experience dyspnea with exertion. He denies syncope.       Past Medical History:   Diagnosis Date    Benign essential HTN     CAD (coronary artery disease) of artery bypass graft     Cardiomyopathy     Coronary artery disease     DM2 (diabetes mellitus, type 2)     Hodgkin's lymphoma 1984    Malignant    Hyperlipidemia, mixed     Hypothyroidism     Type 2 diabetes mellitus 10/15/2020       Past Surgical History:   Procedure Laterality Date    ABDOMINAL AORTIC ANEURYSM REPAIR  2011    CARDIAC CATHETERIZATION      COLONOSCOPY  2013    [facility]    CORONARY ARTERY BYPASS GRAFT  2010    SPLENECTOMY  1983         Current Outpatient Medications:     Aspirin Low Dose 81 MG EC tablet, TAKE ONE TABLET BY MOUTH DAILY, Disp: 90 tablet, Rfl: 3    levothyroxine (SYNTHROID, LEVOTHROID) 150 MCG tablet, Take 1 tablet by mouth Daily., Disp: 90 tablet, Rfl: 3     "losartan (COZAAR) 100 MG tablet, Take 1 tablet by mouth Daily., Disp: 90 tablet, Rfl: 3    metoprolol tartrate (LOPRESSOR) 25 MG tablet, Take 1 tablet by mouth 2 (Two) Times a Day., Disp: 180 tablet, Rfl: 3    simvastatin (ZOCOR) 40 MG tablet, Take 1 tablet by mouth Daily., Disp: 90 tablet, Rfl: 3    triamcinolone (KENALOG) 0.1 % cream, , Disp: , Rfl:   No current facility-administered medications for this visit.    Social History     Socioeconomic History    Marital status:    Tobacco Use    Smoking status: Former     Types: Cigarettes     Quit date:      Years since quittin.0    Smokeless tobacco: Former    Tobacco comments:     less than 1ppd   Vaping Use    Vaping Use: Never used   Substance and Sexual Activity    Alcohol use: Yes     Comment: current some day drinks rarely    Drug use: Never    Sexual activity: Defer       Family History   Problem Relation Age of Onset    Stroke Father        The following portions of the patient's history were reviewed and updated as appropriate:  allergies, current medications, family history, social history, surgical history and problem list.     Review of Systems   Constitutional: Negative for chills, diaphoresis and malaise/fatigue.   Cardiovascular:  Positive for dyspnea on exertion. Negative for chest pain, irregular heartbeat, leg swelling, near-syncope, orthopnea, palpitations, paroxysmal nocturnal dyspnea and syncope.   Respiratory:  Negative for cough, shortness of breath, sleep disturbances due to breathing and sputum production.    Gastrointestinal:  Negative for change in bowel habit.   Genitourinary:  Negative for urgency.   Neurological:  Negative for dizziness and headaches.   Psychiatric/Behavioral:  Negative for altered mental status.        Ht 172.7 cm (68\")   Wt 79.4 kg (175 lb)   BMI 26.61 kg/m²     Objective         Diagnoses and all orders for this visit:    1. Encounter to discuss test results (Primary)    2. Coronary artery disease " involving native coronary artery of native heart without angina pectoris    3. Mixed hyperlipidemia    4. Nonrheumatic aortic valve stenosis  -     Adult Transesophageal Echo (JOSE ANTONIO) W/ Cont if Necessary Per Protocol; Future    We will arrange for a JOSE ANTONIO with Dr. Mclain to further assess aortic valve stenosis. Case discused with Dr. Mclain.       This visit has been rescheduled as a phone visit to comply with patient safety concerns in accordance with CDC recommendations. Total time of discussion was 30 minutes.

## 2024-01-10 ENCOUNTER — TELEPHONE (OUTPATIENT)
Dept: INTERNAL MEDICINE | Facility: CLINIC | Age: 64
End: 2024-01-10

## 2024-01-10 NOTE — TELEPHONE ENCOUNTER
Provider: INDIA JEFFRIES    Caller: MAXIM WILBURN    Relationship to Patient: SELF    Pharmacy:     Phone Number: [phone]     Reason for Call: WOULD LIKE TO TALK TO DR. JEFFRIES ABOUT A DIAGNOSIS THAT ANOTHER DOCTOR HAS DIAGNOSED HIM WITH, ABOUT A RECENT JOSE ANTONIO    PLEASE CALL ASAP

## 2024-01-10 NOTE — TELEPHONE ENCOUNTER
"    Caller: Juan Miguel Chairez \"Harjinder\"    Relationship to patient: Self    Best call back number: [phone]    Patient is needing: PATIENT CALLED WITH [employer] INSURANCE ON THE LINE TO SPEAK WITH CLINICAL STAFF REGARDING AN ORDER THAT HAD BEEN PLACED. PATIENT STATES HE WAS SEEN AT A [facility] OUT OF STATE YESTERDAY AND WAS ORDERED A CT SCAN WITHOUT CONTRAST BUT [employer] IS STATING THE ONLY ORDER THEY RECEIVED IS FROM DOCTOR JEFFRIES AND THAT IT WAS PLACED YESTERDAY 1.9.24 FOR A CT OF THE CHEST WITH CONTRAST. INSURANCE STATED MD JEFFRIES MIGHT BE IN CONTACT WITH THE OTHER PROVIDER THAT PLACED THE ORDER BUT PATIENT WAS CONCERNED BECAUSE HE HAS NOT SEEN MD JEFFRIES AND DID NOT UNDERSTAND WHY THE INSURANCE TOLD HIM IT WAS ORDERED FROM THIS OFFICE. PATIENT IS WANTING TO MAKE SURE HE IS GETTING THE CORRECT CT SCAN FROM THE HOSPITAL VISIT HE HAD YESTERDAY 1.9.24. PLEASE CALL PATIENT TO CLARIFY IF MD JEFFRIES HAD PLACED THIS ORDER.         "

## 2024-01-11 ENCOUNTER — TELEPHONE (OUTPATIENT)
Dept: CARDIAC SURGERY | Facility: CLINIC | Age: 64
End: 2024-01-11
Payer: COMMERCIAL

## 2024-01-11 LAB
BH CV ECHO MEAS - AO MAX PG: 66.3 MMHG
BH CV ECHO MEAS - AO MEAN PG: 38 MMHG
BH CV ECHO MEAS - AO V2 MAX: 407 CM/SEC
BH CV ECHO MEAS - AO V2 VTI: 94.5 CM
BH CV ECHO MEAS - AVA(I,D): 1.49 CM2
BH CV ECHO MEAS - LV MAX PG: 12.5 MMHG
BH CV ECHO MEAS - LV MEAN PG: 8 MMHG
BH CV ECHO MEAS - LV V1 MAX: 177 CM/SEC
BH CV ECHO MEAS - LV V1 VTI: 40.6 CM
BH CV ECHO MEAS - LVOT AREA: 3.5 CM2
BH CV ECHO MEAS - LVOT DIAM: 2.1 CM
BH CV ECHO MEAS - MV DEC SLOPE: 521 CM/SEC2
BH CV ECHO MEAS - MV MAX PG: 21.9 MMHG
BH CV ECHO MEAS - MV MEAN PG: 9 MMHG
BH CV ECHO MEAS - MV P1/2T: 131 MSEC
BH CV ECHO MEAS - MV V2 VTI: 72.4 CM
BH CV ECHO MEAS - MVA(P1/2T): 1.68 CM2
BH CV ECHO MEAS - MVA(VTI): 1.94 CM2
BH CV ECHO MEAS - SV(LVOT): 140.6 ML
BH CV ECHO SHUNT ASSESSMENT PERFORMED (HIDDEN SCRIPTING): 1
SINUS: 4 CM

## 2024-01-11 NOTE — TELEPHONE ENCOUNTER
Spoke with dustin NICKERSON regarding op note for 11/17/2010 from Dr. Moise they will call me back.

## 2024-01-11 NOTE — TELEPHONE ENCOUNTER
Called and spoke to patient about CT chest needed prior to appointment with Dr. Donaldson on 1/18. Patient states he is having a CT chest at [facility] on 1/12. Will get report and images for Dr. Donaldson to review on 1/18.

## 2024-01-15 RX ORDER — ASPIRIN 81 MG/1
TABLET, COATED ORAL
Qty: 90 TABLET | Refills: 3 | Status: SHIPPED | OUTPATIENT
Start: 2024-01-15

## 2024-01-16 ENCOUNTER — TELEPHONE (OUTPATIENT)
Dept: CARDIAC SURGERY | Facility: CLINIC | Age: 64
End: 2024-01-16
Payer: COMMERCIAL

## 2024-01-17 ENCOUNTER — TELEPHONE (OUTPATIENT)
Dept: CARDIAC SURGERY | Facility: CLINIC | Age: 64
End: 2024-01-17
Payer: COMMERCIAL

## 2024-01-17 ENCOUNTER — TELEPHONE (OUTPATIENT)
Dept: INTERNAL MEDICINE | Facility: CLINIC | Age: 64
End: 2024-01-17
Payer: COMMERCIAL

## 2024-01-17 NOTE — TELEPHONE ENCOUNTER
----- Message from Alex South Jr., MD sent at 1/15/2024  3:22 PM EST -----  Pulmonary nodules appear stable. No further workup required.

## 2024-02-27 ENCOUNTER — READMISSION MANAGEMENT (OUTPATIENT)
Dept: CALL CENTER | Facility: HOSPITAL | Age: 64
End: 2024-02-27
Payer: COMMERCIAL

## 2024-02-27 NOTE — OUTREACH NOTE
Prep Survey      Flowsheet Row Responses   [facility] facility patient discharged from? Non-BH   Is LACE score < 7 ? Non-BH Discharge   Eligibility Not Eligible   What are the reasons patient is not eligible? Other  [f/u after sx]   Does the patient have one of the following disease processes/diagnoses(primary or secondary)? Other   Prep survey completed? Yes            HARDEEP ANDERSON - Registered Nurse

## 2024-03-04 ENCOUNTER — OFFICE VISIT (OUTPATIENT)
Dept: INTERNAL MEDICINE | Facility: CLINIC | Age: 64
End: 2024-03-04
Payer: COMMERCIAL

## 2024-03-04 VITALS
OXYGEN SATURATION: 98 % | HEART RATE: 82 BPM | TEMPERATURE: 98 F | WEIGHT: 183.8 LBS | DIASTOLIC BLOOD PRESSURE: 66 MMHG | SYSTOLIC BLOOD PRESSURE: 98 MMHG | HEIGHT: 66 IN | BODY MASS INDEX: 29.54 KG/M2

## 2024-03-04 DIAGNOSIS — I35.0 NONRHEUMATIC AORTIC VALVE STENOSIS: Primary | ICD-10-CM

## 2024-03-04 DIAGNOSIS — I48.92 ATRIAL FLUTTER, UNSPECIFIED TYPE: ICD-10-CM

## 2024-03-04 DIAGNOSIS — R91.1 NODULE OF UPPER LOBE OF RIGHT LUNG: ICD-10-CM

## 2024-03-04 DIAGNOSIS — I10 ESSENTIAL HYPERTENSION: ICD-10-CM

## 2024-03-04 LAB
ALBUMIN SERPL-MCNC: 3.3 G/DL (ref 3.5–5.2)
ALBUMIN/GLOB SERPL: 1 G/DL
ALP SERPL-CCNC: 152 U/L (ref 39–117)
ALT SERPL W P-5'-P-CCNC: 26 U/L (ref 1–41)
ANION GAP SERPL CALCULATED.3IONS-SCNC: 10.7 MMOL/L (ref 5–15)
AST SERPL-CCNC: 32 U/L (ref 1–40)
BASOPHILS # BLD AUTO: 0.16 10*3/MM3 (ref 0–0.2)
BASOPHILS NFR BLD AUTO: 1.7 % (ref 0–1.5)
BILIRUB SERPL-MCNC: 0.5 MG/DL (ref 0–1.2)
BUN SERPL-MCNC: 16 MG/DL (ref 8–23)
BUN/CREAT SERPL: 19.3 (ref 7–25)
CALCIUM SPEC-SCNC: 8.6 MG/DL (ref 8.6–10.5)
CHLORIDE SERPL-SCNC: 97 MMOL/L (ref 98–107)
CO2 SERPL-SCNC: 23.3 MMOL/L (ref 22–29)
CREAT SERPL-MCNC: 0.83 MG/DL (ref 0.76–1.27)
DEPRECATED RDW RBC AUTO: 50.2 FL (ref 37–54)
EGFRCR SERPLBLD CKD-EPI 2021: 99.6 ML/MIN/1.73
EOSINOPHIL # BLD AUTO: 0.37 10*3/MM3 (ref 0–0.4)
EOSINOPHIL NFR BLD AUTO: 3.9 % (ref 0.3–6.2)
ERYTHROCYTE [DISTWIDTH] IN BLOOD BY AUTOMATED COUNT: 15.5 % (ref 12.3–15.4)
GLOBULIN UR ELPH-MCNC: 3.3 GM/DL
GLUCOSE SERPL-MCNC: 79 MG/DL (ref 65–99)
HCT VFR BLD AUTO: 31.6 % (ref 37.5–51)
HGB BLD-MCNC: 10 G/DL (ref 13–17.7)
IMM GRANULOCYTES # BLD AUTO: 0.05 10*3/MM3 (ref 0–0.05)
IMM GRANULOCYTES NFR BLD AUTO: 0.5 % (ref 0–0.5)
LYMPHOCYTES # BLD AUTO: 1.3 10*3/MM3 (ref 0.7–3.1)
LYMPHOCYTES NFR BLD AUTO: 13.7 % (ref 19.6–45.3)
MCH RBC QN AUTO: 28.6 PG (ref 26.6–33)
MCHC RBC AUTO-ENTMCNC: 31.6 G/DL (ref 31.5–35.7)
MCV RBC AUTO: 90.3 FL (ref 79–97)
MONOCYTES # BLD AUTO: 0.96 10*3/MM3 (ref 0.1–0.9)
MONOCYTES NFR BLD AUTO: 10.1 % (ref 5–12)
NEUTROPHILS NFR BLD AUTO: 6.67 10*3/MM3 (ref 1.7–7)
NEUTROPHILS NFR BLD AUTO: 70.1 % (ref 42.7–76)
NRBC BLD AUTO-RTO: 0.2 /100 WBC (ref 0–0.2)
PLATELET # BLD AUTO: 728 10*3/MM3 (ref 140–450)
PMV BLD AUTO: 10 FL (ref 6–12)
POTASSIUM SERPL-SCNC: 4.9 MMOL/L (ref 3.5–5.2)
PROT SERPL-MCNC: 6.6 G/DL (ref 6–8.5)
RBC # BLD AUTO: 3.5 10*6/MM3 (ref 4.14–5.8)
SODIUM SERPL-SCNC: 131 MMOL/L (ref 136–145)
WBC NRBC COR # BLD AUTO: 9.51 10*3/MM3 (ref 3.4–10.8)

## 2024-03-04 PROCEDURE — 80053 COMPREHEN METABOLIC PANEL: CPT | Performed by: INTERNAL MEDICINE

## 2024-03-04 PROCEDURE — 85025 COMPLETE CBC W/AUTO DIFF WBC: CPT | Performed by: INTERNAL MEDICINE

## 2024-03-04 RX ORDER — OXYCODONE HYDROCHLORIDE 5 MG/1
5 TABLET ORAL EVERY 6 HOURS PRN
COMMUNITY
Start: 2024-02-25 | End: 2024-03-04

## 2024-03-04 RX ORDER — POTASSIUM CHLORIDE 20 MEQ/1
20 TABLET, EXTENDED RELEASE ORAL 2 TIMES DAILY
Start: 2024-03-04

## 2024-03-04 RX ORDER — FUROSEMIDE 40 MG/1
40 TABLET ORAL DAILY
COMMUNITY
Start: 2024-02-25

## 2024-03-04 RX ORDER — HYDROXYZINE HYDROCHLORIDE 10 MG/1
10 TABLET, FILM COATED ORAL EVERY 8 HOURS PRN
COMMUNITY
Start: 2024-02-25 | End: 2024-03-04

## 2024-03-04 RX ORDER — METOPROLOL TARTRATE 100 MG/1
1 TABLET ORAL EVERY 12 HOURS
COMMUNITY
Start: 2024-02-25

## 2024-03-04 NOTE — PROGRESS NOTES
Transitional Care Follow Up Visit  Subjective     Juan Miguel Chairez is a 63 y.o. male who presents for a transitional care management visit.    Within 48 business hours after discharge our office contacted him via telephone to coordinate his care and needs.      I reviewed and discussed the details of that call along with the discharge summary, hospital problems, inpatient lab results, inpatient diagnostic studies, and consultation reports with Juan Miguel.     Current outpatient and discharge medications have been reconciled for the patient.  Reviewed by: Alex South Jr., MD    Hospital: [facility]  Date of Admission: 2/16/24  Date of Discharge: 2/24/24      History of Present Illness     Course During Hospital Stay:    Patient admitted for AVS now status post valve aortic and mitral replacement via sternotomy. Patient reports he has a poor appetite. He has been trying to make himself eat 3 small meals per day. Patient has been conscious of heart healthy diet. Patient reports pain is controlled with oxycodone prn. His Bms have returned to normal. He is on higher dose of metoprolol. He is no longer taking losartan. Concern for elevated LFTs after starting amiodarone, so med was stopped as well as his statin.      The following portions of the patient's history were reviewed and updated as appropriate: allergies, current medications, past family history, past medical history, past social history, past surgical history, and problem list.      Objective   Vitals:    03/04/24 1316   BP: 98/66   Pulse: 82   Temp: 98 °F (36.7 °C)   SpO2: 98%       Appearance: No acute distress, well-nourished  Head: normocephalic, atraumatic  Eyes: extraocular movements intact, no scleral icterus, no conjunctival injection  Ears, Nose, and Throat: external ears normal, nares patent, moist mucous membranes  Cardiovascular: regular rate and rhythm. no murmurs, rales, or rhonchi. no edema  Respiratory: breathing comfortably,  symmetric chest rise, clear to auscultation bilaterally. No wheezes, rales, or rhonchi.  Neuro: alert and oriented to time, place, and person. Normal gait  Psych: normal mood and affect     Result Review :   The following data was reviewed by: Alex South Jr, MD on 03/04/2024:  Common labs          10/24/2023    10:19 1/9/2024    10:49 3/4/2024    14:50   Common Labs   Glucose 62  85  79    BUN 11  9  16    Creatinine 0.74  0.75  0.83    Sodium 138  139  131    Potassium 5.2  4.5  4.9    Chloride 103  101  97    Calcium 9.0  8.7  8.6    Albumin 3.9   3.3    Total Bilirubin 0.3   0.5    Alkaline Phosphatase 144   152    AST (SGOT) 31   32    ALT (SGPT) 27   26    WBC 8.08  8.00  9.51    Hemoglobin 13.2  13.5  10.0    Hematocrit 38.8  40.7  31.6    Platelets 403  310  728    Total Cholesterol 158      Triglycerides 95      HDL Cholesterol 49      LDL Cholesterol  91      Hemoglobin A1C 5.50      PSA 2.660          No orders to display         Lab Results   Component Value Date    SARSANTIGEN Detected (A) 07/18/2022    COVID19 NOT DETECTED 11/17/2020    FLUAAG Not Detected 07/18/2022    FLUBAG Not Detected 07/18/2022    INR 0.99 01/09/2024    BILIRUBINUR Negative 05/24/2021       ECG 12 Lead    Date/Time: 3/4/2024 3:43 PM  Performed by: Alex South Jr., MD    Authorized by: Alex South Jr., MD  Comparison: not compared with previous ECG   Rhythm: atrial flutter  Rate: normal  BPM: 86  ST Segments: ST segments normal  T Waves: T waves normal  QRS axis: normal    Clinical impression: abnormal EKG            Assessment & Plan     Diagnoses and all orders for this visit:    1. Nonrheumatic aortic valve stenosis (Primary)  Comments:  s/p repair. obtain labs.  Orders:  -     XR Chest PA & Lateral; Future    2. Nodule of upper lobe of right lung  Comments:  repeat CXR as recommended by surgeons.  Orders:  -     XR Chest PA & Lateral; Future    3. Essential hypertension  Comments:  well  controlled, possibly slightly low.  Orders:  -     Comprehensive Metabolic Panel  -     CBC & Differential    4. Atrial flutter, unspecified type  Comments:  noted on EKG today. rate controlled. cont eliquis. will fax EKG to cardiology.  Orders:  -     ECG 12 Lead    Other orders  -     potassium chloride (K-DUR,KLOR-CON) 20 MEQ CR tablet; Take 1 tablet by mouth 2 (Two) Times a Day.        Medications Discontinued During This Encounter   Medication Reason    triamcinolone (KENALOG) 0.1 % cream *Therapy completed    losartan (COZAAR) 100 MG tablet *Therapy completed    simvastatin (ZOCOR) 40 MG tablet *Therapy completed    oxyCODONE (ROXICODONE) 5 MG immediate release tablet *Therapy completed    metoprolol tartrate (LOPRESSOR) 25 MG tablet *Therapy completed       Return in about 4 weeks (around 4/1/2024) for Recheck.           Alex South Jr, MD  03/08/24  12:52 EST

## 2024-03-05 ENCOUNTER — TELEPHONE (OUTPATIENT)
Dept: INTERNAL MEDICINE | Facility: CLINIC | Age: 64
End: 2024-03-05
Payer: COMMERCIAL

## 2024-03-05 ENCOUNTER — TELEPHONE (OUTPATIENT)
Dept: CARDIOLOGY | Facility: CLINIC | Age: 64
End: 2024-03-05
Payer: COMMERCIAL

## 2024-03-05 DIAGNOSIS — E87.1 HYPONATREMIA: Primary | ICD-10-CM

## 2024-03-05 DIAGNOSIS — E78.2 MIXED HYPERLIPIDEMIA: ICD-10-CM

## 2024-03-05 RX ORDER — SIMVASTATIN 40 MG
40 TABLET ORAL DAILY
Qty: 90 TABLET | Refills: 3 | Status: SHIPPED | OUTPATIENT
Start: 2024-03-05

## 2024-03-05 NOTE — TELEPHONE ENCOUNTER
Patient needs an appt with Dr Mclain due to his EKG being abnormal please add on before his surgery with us or dr valles

## 2024-03-05 NOTE — TELEPHONE ENCOUNTER
Patient stated he is no longer taking the Lasix as he was not sent home with any, should he be taking?

## 2024-03-05 NOTE — TELEPHONE ENCOUNTER
----- Message from Alex South Jr., MD sent at 3/5/2024  7:51 AM EST -----  Sodium is low. Kidney function appears normal. Would recommend reduce lasix to every other day dosing.     Hgb is low but likely related to being post-op. Liver enzymes and function appear to have normalized. Patient can restart his cholesterol medication.

## 2024-03-06 ENCOUNTER — HOSPITAL ENCOUNTER (OUTPATIENT)
Dept: GENERAL RADIOLOGY | Facility: HOSPITAL | Age: 64
Discharge: HOME | End: 2024-03-06
Admitting: INTERNAL MEDICINE
Payer: COMMERCIAL

## 2024-03-06 DIAGNOSIS — R91.1 NODULE OF UPPER LOBE OF RIGHT LUNG: ICD-10-CM

## 2024-03-06 DIAGNOSIS — I35.0 NONRHEUMATIC AORTIC VALVE STENOSIS: ICD-10-CM

## 2024-03-06 PROCEDURE — 71046 X-RAY EXAM CHEST 2 VIEWS: CPT

## 2024-03-11 ENCOUNTER — HOSPITAL ENCOUNTER (EMERGENCY)
Facility: HOSPITAL | Age: 64
Discharge: HOME | End: 2024-03-12
Attending: EMERGENCY MEDICINE
Payer: COMMERCIAL

## 2024-03-11 DIAGNOSIS — R04.0 ACUTE ANTERIOR EPISTAXIS: Primary | ICD-10-CM

## 2024-03-11 DIAGNOSIS — D64.9 CHRONIC ANEMIA: ICD-10-CM

## 2024-03-11 LAB
ALBUMIN SERPL-MCNC: 3.4 G/DL (ref 3.5–5.2)
ALBUMIN/GLOB SERPL: 0.9 G/DL
ALP SERPL-CCNC: 159 U/L (ref 39–117)
ALT SERPL W P-5'-P-CCNC: 19 U/L (ref 1–41)
ANION GAP SERPL CALCULATED.3IONS-SCNC: 11.3 MMOL/L (ref 5–15)
AST SERPL-CCNC: 26 U/L (ref 1–40)
BASOPHILS # BLD AUTO: 0.16 10*3/MM3 (ref 0–0.2)
BASOPHILS NFR BLD AUTO: 1.6 % (ref 0–1.5)
BILIRUB SERPL-MCNC: 0.6 MG/DL (ref 0–1.2)
BUN SERPL-MCNC: 17 MG/DL (ref 8–23)
BUN/CREAT SERPL: 18.7 (ref 7–25)
CALCIUM SPEC-SCNC: 8.8 MG/DL (ref 8.6–10.5)
CHLORIDE SERPL-SCNC: 98 MMOL/L (ref 98–107)
CO2 SERPL-SCNC: 24.7 MMOL/L (ref 22–29)
CREAT SERPL-MCNC: 0.91 MG/DL (ref 0.76–1.27)
DEPRECATED RDW RBC AUTO: 52.4 FL (ref 37–54)
EGFRCR SERPLBLD CKD-EPI 2021: 95.9 ML/MIN/1.73
EOSINOPHIL # BLD AUTO: 0.5 10*3/MM3 (ref 0–0.4)
EOSINOPHIL NFR BLD AUTO: 5 % (ref 0.3–6.2)
ERYTHROCYTE [DISTWIDTH] IN BLOOD BY AUTOMATED COUNT: 16.2 % (ref 12.3–15.4)
GLOBULIN UR ELPH-MCNC: 3.7 GM/DL
GLUCOSE SERPL-MCNC: 143 MG/DL (ref 65–99)
HCT VFR BLD AUTO: 31.6 % (ref 37.5–51)
HGB BLD-MCNC: 9.7 G/DL (ref 13–17.7)
IMM GRANULOCYTES # BLD AUTO: 0.02 10*3/MM3 (ref 0–0.05)
IMM GRANULOCYTES NFR BLD AUTO: 0.2 % (ref 0–0.5)
INR PPP: 1.46 (ref 0.86–1.15)
LYMPHOCYTES # BLD AUTO: 1.39 10*3/MM3 (ref 0.7–3.1)
LYMPHOCYTES NFR BLD AUTO: 14 % (ref 19.6–45.3)
MCH RBC QN AUTO: 27.1 PG (ref 26.6–33)
MCHC RBC AUTO-ENTMCNC: 30.7 G/DL (ref 31.5–35.7)
MCV RBC AUTO: 88.3 FL (ref 79–97)
MONOCYTES # BLD AUTO: 0.86 10*3/MM3 (ref 0.1–0.9)
MONOCYTES NFR BLD AUTO: 8.7 % (ref 5–12)
NEUTROPHILS NFR BLD AUTO: 7.01 10*3/MM3 (ref 1.7–7)
NEUTROPHILS NFR BLD AUTO: 70.5 % (ref 42.7–76)
NRBC BLD AUTO-RTO: 0 /100 WBC (ref 0–0.2)
PLATELET # BLD AUTO: 444 10*3/MM3 (ref 140–450)
PMV BLD AUTO: 9.5 FL (ref 6–12)
POTASSIUM SERPL-SCNC: 4.8 MMOL/L (ref 3.5–5.2)
PROT SERPL-MCNC: 7.1 G/DL (ref 6–8.5)
PROTHROMBIN TIME: 18 SECONDS (ref 11.8–14.9)
RBC # BLD AUTO: 3.58 10*6/MM3 (ref 4.14–5.8)
SODIUM SERPL-SCNC: 134 MMOL/L (ref 136–145)
WBC NRBC COR # BLD AUTO: 9.94 10*3/MM3 (ref 3.4–10.8)

## 2024-03-11 PROCEDURE — 36415 COLL VENOUS BLD VENIPUNCTURE: CPT

## 2024-03-11 PROCEDURE — 99283 EMERGENCY DEPT VISIT LOW MDM: CPT

## 2024-03-11 PROCEDURE — 85025 COMPLETE CBC W/AUTO DIFF WBC: CPT

## 2024-03-11 PROCEDURE — 80053 COMPREHEN METABOLIC PANEL: CPT

## 2024-03-11 PROCEDURE — 85610 PROTHROMBIN TIME: CPT

## 2024-03-12 ENCOUNTER — OFFICE VISIT (OUTPATIENT)
Dept: INTERNAL MEDICINE | Facility: CLINIC | Age: 64
End: 2024-03-12
Payer: COMMERCIAL

## 2024-03-12 VITALS
WEIGHT: 175.04 LBS | HEIGHT: 67 IN | RESPIRATION RATE: 17 BRPM | HEART RATE: 82 BPM | SYSTOLIC BLOOD PRESSURE: 132 MMHG | DIASTOLIC BLOOD PRESSURE: 96 MMHG | TEMPERATURE: 98.3 F | OXYGEN SATURATION: 100 % | BODY MASS INDEX: 27.47 KG/M2

## 2024-03-12 VITALS
TEMPERATURE: 96.7 F | OXYGEN SATURATION: 97 % | WEIGHT: 178.6 LBS | DIASTOLIC BLOOD PRESSURE: 81 MMHG | HEIGHT: 67 IN | BODY MASS INDEX: 28.03 KG/M2 | HEART RATE: 98 BPM | SYSTOLIC BLOOD PRESSURE: 129 MMHG

## 2024-03-12 DIAGNOSIS — M54.6 ACUTE MIDLINE THORACIC BACK PAIN: ICD-10-CM

## 2024-03-12 DIAGNOSIS — I48.92 ATRIAL FLUTTER, UNSPECIFIED TYPE: ICD-10-CM

## 2024-03-12 DIAGNOSIS — R04.0 EPISTAXIS: Primary | ICD-10-CM

## 2024-03-12 PROCEDURE — 99214 OFFICE O/P EST MOD 30 MIN: CPT | Performed by: INTERNAL MEDICINE

## 2024-03-12 RX ORDER — OXYMETAZOLINE HYDROCHLORIDE 0.05 G/100ML
2 SPRAY NASAL 2 TIMES DAILY PRN
Qty: 15 ML | Refills: 0 | Status: SHIPPED | OUTPATIENT
Start: 2024-03-12

## 2024-03-12 RX ORDER — TRANEXAMIC ACID 100 MG/ML
500 INJECTION, SOLUTION INTRAVENOUS ONCE
Status: COMPLETED | OUTPATIENT
Start: 2024-03-12 | End: 2024-03-12

## 2024-03-12 RX ORDER — LIDOCAINE 50 MG/G
1 PATCH TOPICAL EVERY 24 HOURS
Qty: 30 EACH | Refills: 1 | Status: SHIPPED | OUTPATIENT
Start: 2024-03-12

## 2024-03-12 RX ORDER — OXYCODONE HYDROCHLORIDE 5 MG/1
5 TABLET ORAL EVERY 8 HOURS PRN
Qty: 20 TABLET | Refills: 0 | Status: SHIPPED | OUTPATIENT
Start: 2024-03-12

## 2024-03-12 RX ORDER — METOPROLOL TARTRATE 50 MG/1
100 TABLET, FILM COATED ORAL ONCE
Status: COMPLETED | OUTPATIENT
Start: 2024-03-12 | End: 2024-03-12

## 2024-03-12 RX ADMIN — METOPROLOL TARTRATE 100 MG: 50 TABLET ORAL at 00:54

## 2024-03-12 RX ADMIN — TRANEXAMIC ACID 500 MG: 100 INJECTION, SOLUTION INTRAVENOUS at 00:54

## 2024-03-12 NOTE — DISCHARGE INSTRUCTIONS
Please moisten the nasal packing with saline nasal spray and remove in the morning    Please hold your Eliquis in the morning and resume at night    Please continue all of your other medications    Please return to the emergency room immediately for recurrent nosebleed, coughing up blood, shortness of breath, unusual fatigue, near passing out, passing out, vomiting or any new symptoms you are concerned with

## 2024-03-12 NOTE — PROGRESS NOTES
"Chief Complaint  Nose Bleed (Last night he couldn't get it too stop /It went on for about an hour /Went to the ER over it- he has been on Eliquis for about a month )    Subjective          Juan Miguel Chairez presents to [facility]  History of Present Illness  Patient reports going to dinner last night and having episode of epistaxis. Patient could not get it to quit bleeding prior to ER visit. He had nasal packing in ER. Patient has been holding his eliquis. Patient denies hematuria, melena, hematochezia. Patient has   Thoracic back pain - thought related to surgery. Patient reports difficulty sleeping unless he takes an oxycodone to help. Patient has not tried lidocaine patches yet.     Current Outpatient Medications   Medication Instructions    apixaban (ELIQUIS) 5 MG tablet tablet 1 tablet, Oral, 2 Times Daily    Aspirin Low Dose 81 MG EC tablet TAKE ONE TABLET BY MOUTH DAILY    furosemide (LASIX) 40 mg, Daily    levothyroxine (SYNTHROID, LEVOTHROID) 150 mcg, Oral, Daily    lidocaine (LIDODERM) 5 % 1 patch, Transdermal, Every 24 Hours, Remove & Discard patch within 12 hours or as directed by MD    metoprolol tartrate (LOPRESSOR) 100 MG tablet 1 tablet, Oral, Every 12 Hours    oxyCODONE (ROXICODONE) 5 mg, Oral, Every 8 Hours PRN    oxymetazoline (Afrin Nasal Spray) 0.05 % nasal spray 2 sprays, Nasal, 2 Times Daily PRN    potassium chloride (K-DUR,KLOR-CON) 20 MEQ CR tablet 20 mEq, Oral, 2 Times Daily    simvastatin (ZOCOR) 40 mg, Oral, Daily       The following portions of the patient's history were reviewed and updated as appropriate: allergies, current medications, past family history, past medical history, past social history, past surgical history, and problem list.    Objective   Vital Signs:   /81 (BP Location: Left arm)   Pulse 98   Temp 96.7 °F (35.9 °C) (Temporal)   Ht 170.2 cm (67\")   Wt 81 kg (178 lb 9.6 oz)   SpO2 97%   BMI 27.97 kg/m²     BP " Readings from Last 3 Encounters:   03/12/24 129/81   03/12/24 132/96   03/04/24 98/66     Wt Readings from Last 3 Encounters:   03/12/24 81 kg (178 lb 9.6 oz)   03/11/24 79.4 kg (175 lb 0.7 oz)   03/04/24 83.4 kg (183 lb 12.8 oz)        Physical Exam   Appearance: No acute distress, well-nourished  Head: normocephalic, atraumatic  Eyes: extraocular movements intact, no scleral icterus, no conjunctival injection  Ears, Nose, and Throat: external ears normal, nares patent, moist mucous membranes  Cardiovascular: regular rate and rhythm. no murmurs, rubs, or gallops. no edema  Respiratory: breathing comfortably, symmetric chest rise, clear to auscultation bilaterally. No wheezes, rales, or rhonchi.  Neuro: alert and oriented to time, place, and person. Normal gait  Psych: normal mood and affect       Result Review :   The following data was reviewed by: Alex South Jr, MD on 03/12/2024:  Common labs          1/9/2024    10:49 3/4/2024    14:50 3/11/2024    22:59   Common Labs   Glucose 85  79  143    BUN 9  16  17    Creatinine 0.75  0.83  0.91    Sodium 139  131  134    Potassium 4.5  4.9  4.8    Chloride 101  97  98    Calcium 8.7  8.6  8.8    Albumin  3.3  3.4    Total Bilirubin  0.5  0.6    Alkaline Phosphatase  152  159    AST (SGOT)  32  26    ALT (SGPT)  26  19    WBC 8.00  9.51  9.94    Hemoglobin 13.5  10.0  9.7    Hematocrit 40.7  31.6  31.6    Platelets 310  728  444        Lab Results   Component Value Date    SARSANTIGEN Detected (A) 07/18/2022    COVID19 NOT DETECTED 11/17/2020    FLUAAG Not Detected 07/18/2022    FLUBAG Not Detected 07/18/2022    INR 1.46 (H) 03/11/2024    BILIRUBINUR Negative 05/24/2021        Assessment and Plan    Diagnoses and all orders for this visit:    1. Epistaxis (Primary)  Comments:  improved at this time. use packing and afrin prn.  Orders:  -     oxymetazoline (Afrin Nasal Spray) 0.05 % nasal spray; 2 sprays into the nostril(s) as directed by provider 2 (Two)  Times a Day As Needed (epistaxis).  Dispense: 15 mL; Refill: 0    2. Atrial flutter, unspecified type  Comments:  cardiology notified. cont eliquis and BB. HR controlled.    3. Acute midline thoracic back pain  Comments:  thought related to surgery. will Rx short course of oxycodone as well as lidocaine patches prn.  Orders:  -     lidocaine (LIDODERM) 5 %; Place 1 patch on the skin as directed by provider Daily. Remove & Discard patch within 12 hours or as directed by MD  Dispense: 30 each; Refill: 1  -     oxyCODONE (Roxicodone) 5 MG immediate release tablet; Take 1 tablet by mouth Every 8 (Eight) Hours As Needed for Moderate Pain or Severe Pain.  Dispense: 20 tablet; Refill: 0      There are no discontinued medications.     Follow Up   Return for Next scheduled follow up.  Patient was given instructions and counseling regarding his condition or for health maintenance advice. Please see specific information pulled into the AVS if appropriate.       Alex South Jr, MD  03/12/24  16:35 EDT

## 2024-03-12 NOTE — ED PROVIDER NOTES
Time: 10:50 PM EDT  Date of encounter:  3/11/2024  Independent Historian/Clinical History and Information was obtained by:   Patient    History is limited by: N/A    Chief Complaint   Patient presents with    Nose Bleed         History of Present Illness:  Patient is a 63 y.o. year old male who presents to the emergency department for evaluation of nosebleed for 2 hours.  Patient notes that he has had intermittent epistaxis for 2 days.  The patient has been applying saline nasal spray but he continues to bleed.  It has stopped the last several days with pressure after 20 minutes.  However tonight it has not stopped after pressure at 20 minutes.  The patient is status post valve replacement at [facility] in February.  During that surgery the patient did develop atrial fibrillation was placed on Eliquis.  The patient has been taking Eliquis as prescribed.  Patient denies a headache.  The patient has no chest pain or shortness of breath.  The patient has no near-syncope or syncope.  Patient does feel that the bleeding is coming from the left nares only      Patient Care Team  Primary Care Provider: Alex South Jr., MD    Past Medical History:     No Known Allergies  Past Medical History:   Diagnosis Date    Benign essential HTN     CAD (coronary artery disease) of artery bypass graft     Cardiomyopathy     Coronary artery disease     DM2 (diabetes mellitus, type 2)     Hodgkin's lymphoma 1984    Malignant    Hyperlipidemia, mixed     Hypothyroidism     Type 2 diabetes mellitus 10/15/2020     Past Surgical History:   Procedure Laterality Date    ABDOMINAL AORTIC ANEURYSM REPAIR  2011    CARDIAC CATHETERIZATION      COLONOSCOPY  2013    [facility]    CORONARY ARTERY BYPASS GRAFT  2010    MITRAL VALVE REPLACEMENT  2024    SPLENECTOMY  1983     Family History   Problem Relation Age of Onset    Stroke Father        Home Medications:  Prior to Admission medications    Medication Sig Start Date End Date Taking?  Authorizing Provider   apixaban (ELIQUIS) 5 MG tablet tablet Take 1 tablet by mouth 2 (Two) Times a Day. 2/25/24 3/27/24  Alexia Xavier MD   Aspirin Low Dose 81 MG EC tablet TAKE ONE TABLET BY MOUTH DAILY 1/15/24   Alex South Jr., MD   furosemide (LASIX) 40 MG tablet Take 1 tablet by mouth Daily. 24   Alexia Xavier MD   levothyroxine (SYNTHROID, LEVOTHROID) 150 MCG tablet Take 1 tablet by mouth Daily. 23   Alex South Jr., MD   metoprolol tartrate (LOPRESSOR) 100 MG tablet Take 1 tablet by mouth Every 12 (Twelve) Hours. 24   Alexia Xavier MD   potassium chloride (K-DUR,KLOR-CON) 20 MEQ CR tablet Take 1 tablet by mouth 2 (Two) Times a Day. 3/4/24   Alex South Jr., MD   simvastatin (ZOCOR) 40 MG tablet Take 1 tablet by mouth Daily. 3/5/24   Alex South Jr., MD        Social History:   Social History     Tobacco Use    Smoking status: Former     Current packs/day: 0.00     Types: Cigarettes     Quit date:      Years since quittin.2    Smokeless tobacco: Former    Tobacco comments:     less than 1ppd   Vaping Use    Vaping status: Never Used   Substance Use Topics    Alcohol use: Yes     Comment: current some day drinks rarely    Drug use: Never         Review of Systems:  Review of Systems   Constitutional:  Negative for chills, diaphoresis and fever.   HENT:  Positive for nosebleeds. Negative for congestion, postnasal drip, rhinorrhea and sore throat.    Eyes:  Negative for photophobia.   Respiratory:  Negative for cough, chest tightness and shortness of breath.    Cardiovascular:  Negative for chest pain, palpitations and leg swelling.   Gastrointestinal:  Negative for abdominal pain, diarrhea, nausea and vomiting.   Genitourinary:  Negative for difficulty urinating, dysuria, flank pain, frequency, hematuria and urgency.   Musculoskeletal:  Negative for neck pain and neck stiffness.   Skin:  Negative for pallor and  "rash.   Neurological:  Negative for dizziness, syncope, weakness, numbness and headaches.   Hematological:  Negative for adenopathy. Does not bruise/bleed easily.   Psychiatric/Behavioral: Negative.          Physical Exam:  /96   Pulse 82   Temp 98.3 °F (36.8 °C) (Oral)   Resp 17   Ht 170.2 cm (67\")   Wt 79.4 kg (175 lb 0.7 oz)   SpO2 100%   BMI 27.42 kg/m²         Physical Exam  Vitals and nursing note reviewed.   Constitutional:       General: He is not in acute distress.     Appearance: Normal appearance. He is not ill-appearing, toxic-appearing or diaphoretic.   HENT:      Head: Normocephalic and atraumatic.      Nose: No nasal deformity, signs of injury, nasal tenderness or congestion.      Right Nostril: No epistaxis, septal hematoma or occlusion.      Left Nostril: Epistaxis present.      Mouth/Throat:      Mouth: Mucous membranes are dry.      Comments: Patient appeared to have a clot within the oropharynx  Eyes:      Extraocular Movements: Extraocular movements intact.      Conjunctiva/sclera: Conjunctivae normal.      Pupils: Pupils are equal, round, and reactive to light.   Cardiovascular:      Rate and Rhythm: Tachycardia present. Rhythm irregular.      Pulses: Normal pulses.           Carotid pulses are 2+ on the right side and 2+ on the left side.       Radial pulses are 2+ on the right side and 2+ on the left side.        Femoral pulses are 2+ on the right side and 2+ on the left side.       Popliteal pulses are 2+ on the right side and 2+ on the left side.        Dorsalis pedis pulses are 2+ on the right side and 2+ on the left side.        Posterior tibial pulses are 2+ on the right side and 2+ on the left side.      Heart sounds: Normal heart sounds. No murmur heard.  Pulmonary:      Effort: Pulmonary effort is normal. No accessory muscle usage, respiratory distress or retractions.      Breath sounds: Normal breath sounds. No wheezing, rhonchi or rales.   Abdominal:      General: " Abdomen is flat. There is no distension.      Palpations: Abdomen is soft. There is no mass or pulsatile mass.      Tenderness: There is no abdominal tenderness. There is no right CVA tenderness, left CVA tenderness, guarding or rebound.      Comments: No rigidity   Musculoskeletal:         General: No swelling, tenderness or deformity.      Cervical back: Neck supple. No tenderness.      Right lower leg: No edema.      Left lower leg: No edema.   Skin:     General: Skin is warm and dry.      Capillary Refill: Capillary refill takes less than 2 seconds.      Coloration: Skin is not cyanotic, jaundiced or pale.      Findings: No erythema.   Neurological:      General: No focal deficit present.      Mental Status: He is alert and oriented to person, place, and time. Mental status is at baseline.      Cranial Nerves: Cranial nerves 2-12 are intact. No cranial nerve deficit.      Sensory: Sensation is intact. No sensory deficit.      Motor: Motor function is intact. No weakness or pronator drift.      Coordination: Coordination is intact. Coordination normal.   Psychiatric:         Attention and Perception: Attention and perception normal.         Mood and Affect: Mood normal.         Behavior: Behavior normal.                  Procedures:  Epistaxis Management    Date/Time: 3/12/2024 3:04 AM    Performed by: Lalito Mcdonough DO  Authorized by: Lalito Mcdonough DO    Consent:     Consent obtained:  Verbal    Consent given by:  Patient    Risks, benefits, and alternatives were discussed: yes      Risks discussed:  Bleeding, infection, nasal injury and pain    Alternatives discussed:  No treatment  Universal protocol:     Patient identity confirmed:  Verbally with patient  Anesthesia:     Anesthesia method:  None  Procedure details:     Treatment site:  R anterior    Treatment method:  Thrombin and anterior pack    Treatment complexity:  Limited    Treatment episode: initial    Post-procedure details:     Assessment:   Bleeding stopped    Procedure completion:  Tolerated  Comments:      The patient's left nare was packed with TXA.  The patient was reassessed.  The patient had no active bleeding.  There is no bleeding within the oropharynx.  I discussed pulling the packing at this time versus the patient pulling the packing in the morning.  The patient states that he would prefer to pull the packing in the morning as the bleeding has now stopped.        Medical Decision Making:      Comorbidities that affect care:    Coronary disease, hyperlipidemia, hypothyroidism, diabetes, cardiomyopathy, Hodgkin's lymphoma, recent valve replacement, aortic aneurysm repair, atrial fibrillation    External Notes reviewed:    None      The following orders were placed and all results were independently analyzed by me:  Orders Placed This Encounter   Procedures    Epistaxis Management    Comprehensive Metabolic Panel    Protime-INR    CBC Auto Differential    Nasal tray to bedside Please hookup suction  Nursing Communication    CBC & Differential       Medications Given in the Emergency Department:  Medications   tranexamic acid 500 MG/5ML nasal (ED/Crit Care for epistaxis) - VIAL DISPENSE 500 mg (500 mg Nasal Given 3/12/24 0054)   metoprolol tartrate (LOPRESSOR) tablet 100 mg (100 mg Oral Given 3/12/24 0054)        ED Course:    The patient was initially evaluated in the triage area where orders were placed. The patient was later dispositioned by Lalito Mcdonough DO.      The patient was advised to stay for completion of workup which includes but is not limited to communication of labs and radiological results, reassessment and plan. The patient was advised that leaving prior to disposition by a provider could result in critical findings that are not communicated to the patient.     ED Course as of 03/13/24 0343   Mon Mar 11, 2024   9925 PROVIDER IN TRIAGE  Patient was evaluated by me in triage, Sunshine WADE.  Orders were placed and patient is  currently awaiting final results and disposition.  [CT]      ED Course User Index  [CT] Sunshine Amaro, SHERI       Labs:    Lab Results (last 24 hours)       ** No results found for the last 24 hours. **             Imaging:    No Radiology Exams Resulted Within Past 24 Hours      Differential Diagnosis and Discussion:      Epistaxis: Differential diagnosis includes but is not limited to trauma, environmental exposure, coagulopathy, allergic, infectious, hypertension, foreign bodies, hormonal, and tumors.    All labs were reviewed and interpreted by me.    MDM  Number of Diagnoses or Management Options  Acute anterior epistaxis  Chronic anemia  Diagnosis management comments: The patient's vital signs were stable in the emergency room.    The patient's CMP was reviewed and shows no abnormalities of critical concern.  Of note, the patient's sodium and potassium are acceptable.  The patient's liver enzymes are unremarkable.  The patient's renal function including creatinine is preserved.  The patient has a normal anion gap.    The patient's CBC was reviewed and shows no abnormalities of critical concern.  Of note, there is no anemia requiring a blood transfusion and the platelet count is acceptable    At the time of discharge, the patient appeared well, no distress and nontoxic.  The patient had no epistaxis.  The patient had no active bleeding in the oropharynx.  The patient is requesting to pull the packing tomorrow.  The patient will follow-up with her doctor tomorrow for reevaluation.  The patient will be referred to ENT.    The patient was given very specific instructions on when and why to return to the emergency room.  The patient voiced understanding and felt comfortable with the discharge instructions.  They would return to the emergency room if necessary.  The patient appears appropriate for discharge and outpatient follow-up.         Amount and/or Complexity of Data Reviewed  Clinical lab tests:  reviewed  Decide to obtain previous medical records or to obtain history from someone other than the patient: yes         Social Determinants of Health:    Patient is independent, reliable, and has access to care.       Disposition and Care Coordination:    Discharged: The patient is suitable and stable for discharge with no need for consideration of admission.    I have explained discharge medications and the need for follow up with the patient/caretakers. This was also printed in the discharge instructions. Patient was discharged with the following medications and follow up:      Medication List      No changes were made to your prescriptions during this visit.      Alex South Jr., MD  [address]  [address]  [facility] [address]  [phone]    Call on 3/13/2024      Colby Almanza MD  [address] [facility]  [address]  [facility] [address]  [phone]    On 3/13/2024  Anterior epistaxis, call for appointment       Final diagnoses:   Acute anterior epistaxis   Chronic anemia        ED Disposition       ED Disposition   Discharge    Condition   Stable    Comment   --               This medical record created using voice recognition software.             Lalito Mcdonough DO  03/13/24 0343

## 2024-03-13 ENCOUNTER — PRIOR AUTHORIZATION (OUTPATIENT)
Dept: INTERNAL MEDICINE | Facility: CLINIC | Age: 64
End: 2024-03-13
Payer: COMMERCIAL

## 2024-03-13 NOTE — TELEPHONE ENCOUNTER
PA REQUIRED FOR FOLLOWING MEDICATION:    lidocaine (LIDODERM) 5 % (03/12/2024)     INDEXED VIA ONBASE

## 2024-03-15 ENCOUNTER — TELEPHONE (OUTPATIENT)
Dept: CARDIAC SURGERY | Facility: CLINIC | Age: 64
End: 2024-03-15
Payer: COMMERCIAL

## 2024-03-19 ENCOUNTER — OFFICE VISIT (OUTPATIENT)
Dept: INTERNAL MEDICINE | Facility: CLINIC | Age: 64
End: 2024-03-19
Payer: COMMERCIAL

## 2024-03-19 VITALS
HEIGHT: 67 IN | WEIGHT: 175.2 LBS | TEMPERATURE: 97.8 F | SYSTOLIC BLOOD PRESSURE: 107 MMHG | OXYGEN SATURATION: 98 % | BODY MASS INDEX: 27.5 KG/M2 | HEART RATE: 68 BPM | DIASTOLIC BLOOD PRESSURE: 71 MMHG

## 2024-03-19 DIAGNOSIS — I35.0 NONRHEUMATIC AORTIC VALVE STENOSIS: Primary | ICD-10-CM

## 2024-03-19 DIAGNOSIS — I10 ESSENTIAL HYPERTENSION: ICD-10-CM

## 2024-03-19 DIAGNOSIS — I25.10 CORONARY ARTERY DISEASE INVOLVING NATIVE CORONARY ARTERY OF NATIVE HEART WITHOUT ANGINA PECTORIS: ICD-10-CM

## 2024-03-19 DIAGNOSIS — Z23 NEED FOR COVID-19 VACCINE: ICD-10-CM

## 2024-03-19 DIAGNOSIS — R04.0 EPISTAXIS: ICD-10-CM

## 2024-03-19 DIAGNOSIS — E78.2 MIXED HYPERLIPIDEMIA: ICD-10-CM

## 2024-03-19 DIAGNOSIS — R60.1 GENERALIZED EDEMA: ICD-10-CM

## 2024-03-19 PROCEDURE — 99214 OFFICE O/P EST MOD 30 MIN: CPT | Performed by: INTERNAL MEDICINE

## 2024-03-19 RX ORDER — FUROSEMIDE 40 MG/1
40 TABLET ORAL
Qty: 5 TABLET | Refills: 0 | Status: SHIPPED | OUTPATIENT
Start: 2024-03-19

## 2024-03-19 NOTE — PROGRESS NOTES
"Chief Complaint  Follow-up (2 week follow up on surgery )    Subjective          Juan Miguel Chairez presents to [facility]  History of Present Illness  Patient reports having difficulty getting his sleep on track. He is napping during the day more. He is doing more stretching.   Patient reports ongoing back pain, mostly in his upper back. He is doing stretches. Patient would like to walk more, but the weather has not been cooperative.   Patient reports being in normal sinus rhythm for last 4 days per home reading device.   Patient has not had any more nosebleeding.   Patient has follow-up with cardiology in approx 3 weeks. He has follow-up wit CTS in approx 2 months.   Patient is not taking oxycodone very often. Patient is eating better.       Current Outpatient Medications   Medication Instructions    apixaban (ELIQUIS) 5 MG tablet tablet 1 tablet, Oral, 2 Times Daily    Aspirin Low Dose 81 MG EC tablet TAKE ONE TABLET BY MOUTH DAILY    furosemide (LASIX) 40 mg, Oral, Every 3 Days    levothyroxine (SYNTHROID, LEVOTHROID) 150 mcg, Oral, Daily    lidocaine (LIDODERM) 5 % 1 patch, Transdermal, Every 24 Hours, Remove & Discard patch within 12 hours or as directed by MD    metoprolol tartrate (LOPRESSOR) 100 MG tablet 1 tablet, Oral, Every 12 Hours    oxyCODONE (ROXICODONE) 5 mg, Oral, Every 8 Hours PRN    oxymetazoline (Afrin Nasal Spray) 0.05 % nasal spray 2 sprays, Nasal, 2 Times Daily PRN    simvastatin (ZOCOR) 40 mg, Oral, Daily       The following portions of the patient's history were reviewed and updated as appropriate: allergies, current medications, past family history, past medical history, past social history, past surgical history, and problem list.    Objective   Vital Signs:   /71 (BP Location: Left arm, Patient Position: Sitting)   Pulse 68   Temp 97.8 °F (36.6 °C) (Temporal)   Ht 170.2 cm (67\")   Wt 79.5 kg (175 lb 3.2 oz)   SpO2 98%   BMI 27.44 " kg/m²     BP Readings from Last 3 Encounters:   03/19/24 107/71   03/12/24 129/81   03/12/24 132/96     Wt Readings from Last 3 Encounters:   03/19/24 79.5 kg (175 lb 3.2 oz)   03/12/24 81 kg (178 lb 9.6 oz)   03/11/24 79.4 kg (175 lb 0.7 oz)        Physical Exam   Appearance: No acute distress, well-nourished  Head: normocephalic, atraumatic  Eyes: extraocular movements intact, no scleral icterus, no conjunctival injection  Ears, Nose, and Throat: external ears normal, nares patent, moist mucous membranes  Cardiovascular: regular rate and rhythm. no murmurs, rubs, or gallops. 1+ pitting edema BLE.   Respiratory: breathing comfortably, symmetric chest rise, bibasilar rales. No wheezes or rhonchi.   Neuro: alert and oriented to time, place, and person. Normal gait  Psych: normal mood and affect       Result Review :   The following data was reviewed by: Alex South Jr, MD on 03/19/2024:  Common labs          1/9/2024    10:49 3/4/2024    14:50 3/11/2024    22:59   Common Labs   Glucose 85  79  143    BUN 9  16  17    Creatinine 0.75  0.83  0.91    Sodium 139  131  134    Potassium 4.5  4.9  4.8    Chloride 101  97  98    Calcium 8.7  8.6  8.8    Albumin  3.3  3.4    Total Bilirubin  0.5  0.6    Alkaline Phosphatase  152  159    AST (SGOT)  32  26    ALT (SGPT)  26  19    WBC 8.00  9.51  9.94    Hemoglobin 13.5  10.0  9.7    Hematocrit 40.7  31.6  31.6    Platelets 310  728  444        Lab Results   Component Value Date    SARSANTIGEN Detected (A) 07/18/2022    COVID19 NOT DETECTED 11/17/2020    FLUAAG Not Detected 07/18/2022    FLUBAG Not Detected 07/18/2022    INR 1.46 (H) 03/11/2024    BILIRUBINUR Negative 05/24/2021            Assessment and Plan    Diagnoses and all orders for this visit:    1. Nonrheumatic aortic valve stenosis (Primary)  Comments:  s/p AVR. pt reports getting stronger and appetite improving. ongoing back pain that pt is stretching and taking oxycodone prn    2. Coronary artery  disease involving native coronary artery of native heart without angina pectoris  Comments:  cont statin and ASA. defer BB to cardiology    3. Essential hypertension  Comments:  well controlled.    4. Mixed hyperlipidemia  Comments:  cont statin    5. Need for COVID-19 vaccine    6. Epistaxis  Comments:  improved at this time. afrin prn    7. Generalized edema  Comments:  restart lasix for 2 weeks.  Orders:  -     furosemide (LASIX) 40 MG tablet; Take 1 tablet by mouth Every 3 (Three) Days.  Dispense: 5 tablet; Refill: 0        Medications Discontinued During This Encounter   Medication Reason    furosemide (LASIX) 40 MG tablet *Therapy completed    potassium chloride (K-DUR,KLOR-CON) 20 MEQ CR tablet *Therapy completed        Follow Up   Return in about 4 weeks (around 4/16/2024).  Patient was given instructions and counseling regarding his condition or for health maintenance advice. Please see specific information pulled into the AVS if appropriate.       Alex South Jr, MD  03/19/24  14:55 EDT

## 2024-04-08 NOTE — PROGRESS NOTES
Date of Office Visit: 04/10/2024  Encounter Provider: Dr. Froy Mclain  Place of Service: [facility]ARDIOLOG[facility] [place]  Patient Name: Juan Miguel Chairez  :1960  Alex South Jr., MD    Chief Complaint   Patient presents with    Atrial Fibrillation    Coronary Artery Disease    Diabetes    Hypertension    Hyperlipidemia    Follow-up     History of Present Illness:    I am pleased to see Mr. Chairez in my office today for a follow-up     As you know, patient is 63 years old white gentleman whose past medical history is significant for hypertension, hyperlipidemia, diabetes mellitus, coronary artery disease, status post coronary artery bypass graft, cardiomyopathy, who came today for yearly  followup.     In , patient had cardiac workup for his symptom of chest pain.  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  Patient underwent cardiac catheterization which showed 100% lad stenosis, 25% LCX stenosis, RCA had 90% stenosis.  Patient underwent  left ventricle aneurysmectomy and CABG. In 2017, patient had echocardiogram which showed EF of 55% and akinetic distal anterior wall.  Mild aortic stenosis noted.     In 2021, patient underwent echocardiogram which showed EF of 50 to 55%.  Severe aortic stenosis noted with aortic valve area of 0.83 cm² and peak velocity of 3.5 m/s noted.  Peak gradient was 50 mmHg and mean gradient was 30.4 mmHg.  Patient was asymptomatic and recommended to have observation.    In 2024, patient underwent JOSE ANTONIO which showed normal left ventricular size and function with a EF of 55 to 60%.  Moderate to severe left atrial enlargement was noted.  Severe aortic stenosis was noted with peak velocity of 4.0 cm² with peak gradient of 66 mmHg and mean gradient of 38 mmHg.  Patient was noted to have moderate to severe mitral regurgitation and also moderate mitral stenosis with peak gradient of 22 mmHg and mean gradient of 9 mmHg.  Patient  was recommended to proceed with cardiac catheterization.  Which showed left main was normal, proximal LAD has 70% stenosis followed by  occlusion.  LIMA to LAD is patent.  LCx has 80% stenosis RCA was 100% occluded SVG graft to right coronary artery patent.  LCx has 70% stenosis which was not grafted.    In February 2024, patient underwent mitral and aortic valve replacement.  Patient did well postoperatively.  Patient had postoperative atrial fibrillation and subsequently sinus rhythm was achieved.  Patient is off Eliquis.    Patient came today and doing well.  Patient has started exercising.  Patient is walking 30 to 40 minutes on treadmill at a pace of 2.3 mph.  Patient denies any shortness of breath and chest pain.  Patient is feeling improved.  No dizziness or lightheadedness no syncope or presyncope.  No leg edema noted.    Patient is doing very well.  Cardiac rehab would be started.  Patient is advised to follow-up with me in 6 months.  Blood pressure is very well-controlled continue simvastatin repeat lipid panel.  Consider echocardiogram in 6 months      Past Medical History:   Diagnosis Date    Benign essential HTN     CAD (coronary artery disease) of artery bypass graft     Cardiomyopathy     Coronary artery disease     DM2 (diabetes mellitus, type 2)     Hodgkin's lymphoma 1984    Malignant    Hyperlipidemia, mixed     Hypothyroidism     Type 2 diabetes mellitus 10/15/2020         Past Surgical History:   Procedure Laterality Date    ABDOMINAL AORTIC ANEURYSM REPAIR  2011    CARDIAC CATHETERIZATION      COLONOSCOPY  2013    [facility]    CORONARY ARTERY BYPASS GRAFT  2010    MITRAL VALVE REPLACEMENT  2024    SPLENECTOMY  1983           Current Outpatient Medications:     Aspirin Low Dose 81 MG EC tablet, TAKE ONE TABLET BY MOUTH DAILY, Disp: 90 tablet, Rfl: 3    levothyroxine (SYNTHROID, LEVOTHROID) 150 MCG tablet, Take 1 tablet by mouth Daily., Disp: 90 tablet, Rfl: 3    lidocaine (LIDODERM) 5 %, Place  1 patch on the skin as directed by provider Daily. Remove & Discard patch within 12 hours or as directed by MD, Disp: 30 each, Rfl: 1    metoprolol tartrate (LOPRESSOR) 100 MG tablet, Take 1 tablet by mouth Every 12 (Twelve) Hours., Disp: , Rfl:     oxymetazoline (Afrin Nasal Spray) 0.05 % nasal spray, 2 sprays into the nostril(s) as directed by provider 2 (Two) Times a Day As Needed (epistaxis)., Disp: 15 mL, Rfl: 0    simvastatin (ZOCOR) 40 MG tablet, Take 1 tablet by mouth Daily., Disp: 90 tablet, Rfl: 3      Social History     Socioeconomic History    Marital status:    Tobacco Use    Smoking status: Former     Current packs/day: 0.00     Types: Cigarettes     Quit date:      Years since quittin.2    Smokeless tobacco: Former    Tobacco comments:     less than 1ppd   Vaping Use    Vaping status: Never Used   Substance and Sexual Activity    Alcohol use: Yes     Comment: current some day drinks rarely    Drug use: Never    Sexual activity: Defer         Review of Systems   Constitutional: Negative for chills and fever.   HENT:  Negative for ear discharge and nosebleeds.    Eyes:  Negative for discharge and redness.   Cardiovascular:  Negative for chest pain, orthopnea, palpitations, paroxysmal nocturnal dyspnea and syncope.   Respiratory:  Negative for cough, shortness of breath and wheezing.    Endocrine: Negative for heat intolerance.   Skin:  Negative for rash.   Musculoskeletal:  Negative for arthritis and myalgias.   Gastrointestinal:  Negative for abdominal pain, melena, nausea and vomiting.   Genitourinary:  Negative for dysuria and hematuria.   Neurological:  Negative for dizziness, light-headedness, numbness and tremors.   Psychiatric/Behavioral:  Negative for depression. The patient is not nervous/anxious.        Procedures    Procedures    No orders to display           Objective:    /71 (BP Location: Right arm, Patient Position: Sitting, Cuff Size: Adult)   Pulse 76   Resp 16   " Ht 170.2 cm (67.01\")   Wt 74.8 kg (165 lb)   SpO2 100%   BMI 25.84 kg/m²         Constitutional:       Appearance: Well-developed.   Eyes:      General: No scleral icterus.        Right eye: No discharge.   HENT:      Head: Normocephalic and atraumatic.   Neck:      Thyroid: No thyromegaly.      Lymphadenopathy: No cervical adenopathy.   Pulmonary:      Effort: Pulmonary effort is normal. No respiratory distress.      Breath sounds: Normal breath sounds. No wheezing. No rales.   Cardiovascular:      Normal rate. Regular rhythm.      No gallop.    Edema:     Peripheral edema absent.   Abdominal:      Tenderness: There is no abdominal tenderness.   Skin:     Findings: No erythema or rash.   Neurological:      Mental Status: Alert and oriented to person, place, and time.             Assessment:       Diagnosis Plan   1. Coronary artery disease involving native coronary artery of native heart without angina pectoris        2. Mixed hyperlipidemia        3. Essential hypertension        4. Ischemic cardiomyopathy        5. S/P AVR        6. S/P MVR (mitral valve replacement)        7. Nonrheumatic mitral valve regurgitation                 Plan:       MDM:    1.  CAD/CAB/2 bypasses were patent at perioperative cardiac catheterization.  Continue current treatment.    2.  Hypertension:    Blood pressure is controlled continue Toprol-XL    3.  Paroxysmal atrial fibrillation:    Patient had postoperative atrial fibrillation and it has recovered.  Patient is off Eliquis continue to observe    4.  Severe aortic stenosis s/p AVR:    Patient underwent bioprosthetic aortic valve.  I will recommend observation    5.  S/p MVR:    Patient had severe mitral regurgitation and mitral valve was replaced patient did well continue to observe.  Repeat echocardiogram in 6 months    6.  Cardiomyopathy:    Recent echocardiogram showed improvement in LV function continue to observe  "

## 2024-04-10 ENCOUNTER — OFFICE VISIT (OUTPATIENT)
Dept: CARDIOLOGY | Facility: CLINIC | Age: 64
End: 2024-04-10
Payer: COMMERCIAL

## 2024-04-10 VITALS
BODY MASS INDEX: 25.9 KG/M2 | WEIGHT: 165 LBS | OXYGEN SATURATION: 100 % | RESPIRATION RATE: 16 BRPM | HEIGHT: 67 IN | SYSTOLIC BLOOD PRESSURE: 115 MMHG | HEART RATE: 76 BPM | DIASTOLIC BLOOD PRESSURE: 71 MMHG

## 2024-04-10 DIAGNOSIS — I25.5 ISCHEMIC CARDIOMYOPATHY: ICD-10-CM

## 2024-04-10 DIAGNOSIS — I25.10 CORONARY ARTERY DISEASE INVOLVING NATIVE CORONARY ARTERY OF NATIVE HEART WITHOUT ANGINA PECTORIS: Primary | ICD-10-CM

## 2024-04-10 DIAGNOSIS — E78.2 MIXED HYPERLIPIDEMIA: ICD-10-CM

## 2024-04-10 DIAGNOSIS — Z95.2 S/P AVR: ICD-10-CM

## 2024-04-10 DIAGNOSIS — I34.0 NONRHEUMATIC MITRAL VALVE REGURGITATION: ICD-10-CM

## 2024-04-10 DIAGNOSIS — I10 ESSENTIAL HYPERTENSION: ICD-10-CM

## 2024-04-10 DIAGNOSIS — Z95.2 S/P MVR (MITRAL VALVE REPLACEMENT): ICD-10-CM

## 2024-04-10 PROCEDURE — 99214 OFFICE O/P EST MOD 30 MIN: CPT | Performed by: INTERNAL MEDICINE

## 2024-04-17 ENCOUNTER — TELEPHONE (OUTPATIENT)
Dept: CARDIOLOGY | Facility: CLINIC | Age: 64
End: 2024-04-17
Payer: COMMERCIAL

## 2024-04-17 NOTE — TELEPHONE ENCOUNTER
"  Caller: Juan Miguel Chairez \"Harjinder\"    Relationship: Self    Best call back number: [phone]      PATIENT STATES THAT CARDIAC REHAB WAS DISCUSSED AT LAST OFFICE VISIT, HE IS WANTING TO DO IT AT [facility] IN [place].    "

## 2024-04-19 ENCOUNTER — TELEPHONE (OUTPATIENT)
Dept: CARDIAC SURGERY | Facility: CLINIC | Age: 64
End: 2024-04-19
Payer: COMMERCIAL

## 2024-04-19 RX ORDER — METOPROLOL TARTRATE 100 MG/1
100 TABLET ORAL EVERY 12 HOURS
Qty: 180 TABLET | Refills: 1 | Status: SHIPPED | OUTPATIENT
Start: 2024-04-19

## 2024-04-19 NOTE — TELEPHONE ENCOUNTER
PT WENT TO [facility] AND HAD MITRAL AND AORTIC VALVES REPLACED ON 2/16/2024. PT DOESN'T NEED OUR SERVICES AS OF NOW.

## 2024-04-23 NOTE — PROGRESS NOTES
"Chief Complaint  Hypertension (Follow up ) and Nonrheumatic aortic valve stenosis (Follow up )    Subjective          Juan Miguel Chairez presents to [facility]  History of Present Illness  Hypertension- patient denies headaches, dizziness, chest pain.  Atrial flutter- patient reports being in NSR on multiple checks on home device. Hyperlipidemia- Patient is taking statin and aspirin.       Current Outpatient Medications   Medication Instructions    Aspirin Low Dose 81 MG EC tablet TAKE ONE TABLET BY MOUTH DAILY    levothyroxine (SYNTHROID, LEVOTHROID) 150 mcg, Oral, Daily    metoprolol tartrate (LOPRESSOR) 100 mg, Oral, Every 12 Hours    simvastatin (ZOCOR) 40 mg, Oral, Daily       The following portions of the patient's history were reviewed and updated as appropriate: allergies, current medications, past family history, past medical history, past social history, past surgical history, and problem list.    Objective   Vital Signs:   /86 (BP Location: Left arm, Patient Position: Sitting, Cuff Size: Adult)   Pulse 84   Temp 98.4 °F (36.9 °C) (Temporal)   Ht 170.2 cm (67\")   Wt 74.8 kg (165 lb)   SpO2 99%   BMI 25.84 kg/m²     BP Readings from Last 3 Encounters:   04/25/24 146/86   04/10/24 115/71   03/19/24 107/71     Wt Readings from Last 3 Encounters:   04/25/24 74.8 kg (165 lb)   04/10/24 74.8 kg (165 lb)   03/19/24 79.5 kg (175 lb 3.2 oz)         Physical Exam   Appearance: No acute distress, well-nourished  Head: normocephalic, atraumatic  Eyes: extraocular movements intact, no scleral icterus, no conjunctival injection  Ears, Nose, and Throat: external ears normal, nares patent, moist mucous membranes  Cardiovascular: regular rate and rhythm. no murmurs, rubs, or gallops. no edema  Respiratory: breathing comfortably, symmetric chest rise, clear to auscultation bilaterally. No wheezes, rales, or rhonchi.  Neuro: alert and oriented to time, place, and " person. Normal gait  Psych: normal mood and affect     Result Review :   The following data was reviewed by: Alex South Jr, MD on 04/25/2024:  Common labs          1/9/2024    10:49 3/4/2024    14:50 3/11/2024    22:59   Common Labs   Glucose 85  79  143    BUN 9  16  17    Creatinine 0.75  0.83  0.91    Sodium 139  131  134    Potassium 4.5  4.9  4.8    Chloride 101  97  98    Calcium 8.7  8.6  8.8    Albumin  3.3  3.4    Total Bilirubin  0.5  0.6    Alkaline Phosphatase  152  159    AST (SGOT)  32  26    ALT (SGPT)  26  19    WBC 8.00  9.51  9.94    Hemoglobin 13.5  10.0  9.7    Hematocrit 40.7  31.6  31.6    Platelets 310  728  444             Lab Results   Component Value Date    SARSANTIGEN Detected (A) 07/18/2022    COVID19 NOT DETECTED 11/17/2020    FLUAAG Not Detected 07/18/2022    FLUBAG Not Detected 07/18/2022    INR 1.46 (H) 03/11/2024    BILIRUBINUR Negative 05/24/2021            Assessment and Plan    Diagnoses and all orders for this visit:    1. Essential hypertension (Primary)  Comments:  slighlty elevated today, but previously well controlled. cont current med regimen. pt has close f/u with cardiology    2. Mixed hyperlipidemia  Comments:  cont statin and ASA. labs at next appt    3. Nonrheumatic aortic valve stenosis  Comments:  s/p repair. cont f/u with CTS. doing well at this time being back to work and exercising again    4. Colon cancer screening  -     Ambulatory Referral For Screening Colonoscopy          Medications Discontinued During This Encounter   Medication Reason    oxymetazoline (Afrin Nasal Spray) 0.05 % nasal spray *Therapy completed    lidocaine (LIDODERM) 5 % *Therapy completed        Patient has been erroneously marked as diabetic. Based on the available clinical information, he does not have diabetes and should therefore be excluded from diabetic health maintenance and quality measures for the remainder of the reporting period.        Follow Up   Return in about 3  months (around 7/25/2024) for Annual physical.  Patient was given instructions and counseling regarding his condition or for health maintenance advice. Please see specific information pulled into the AVS if appropriate.       Alex South Jr, MD  04/25/24  10:43 EDT

## 2024-04-25 ENCOUNTER — OFFICE VISIT (OUTPATIENT)
Dept: INTERNAL MEDICINE | Facility: CLINIC | Age: 64
End: 2024-04-25
Payer: COMMERCIAL

## 2024-04-25 VITALS
SYSTOLIC BLOOD PRESSURE: 146 MMHG | BODY MASS INDEX: 25.9 KG/M2 | HEIGHT: 67 IN | OXYGEN SATURATION: 99 % | TEMPERATURE: 98.4 F | WEIGHT: 165 LBS | HEART RATE: 84 BPM | DIASTOLIC BLOOD PRESSURE: 86 MMHG

## 2024-04-25 DIAGNOSIS — Z12.11 COLON CANCER SCREENING: ICD-10-CM

## 2024-04-25 DIAGNOSIS — I35.0 NONRHEUMATIC AORTIC VALVE STENOSIS: ICD-10-CM

## 2024-04-25 DIAGNOSIS — E78.2 MIXED HYPERLIPIDEMIA: ICD-10-CM

## 2024-04-25 DIAGNOSIS — I10 ESSENTIAL HYPERTENSION: Primary | ICD-10-CM

## 2024-04-25 PROCEDURE — 99214 OFFICE O/P EST MOD 30 MIN: CPT | Performed by: INTERNAL MEDICINE

## 2024-05-03 ENCOUNTER — TELEPHONE (OUTPATIENT)
Dept: GASTROENTEROLOGY | Facility: CLINIC | Age: 64
End: 2024-05-03
Payer: COMMERCIAL

## 2024-06-03 ENCOUNTER — OFFICE VISIT (OUTPATIENT)
Dept: INTERNAL MEDICINE | Facility: CLINIC | Age: 64
End: 2024-06-03
Payer: COMMERCIAL

## 2024-06-03 VITALS
TEMPERATURE: 97.8 F | SYSTOLIC BLOOD PRESSURE: 153 MMHG | WEIGHT: 164.4 LBS | OXYGEN SATURATION: 94 % | BODY MASS INDEX: 25.8 KG/M2 | HEIGHT: 67 IN | HEART RATE: 80 BPM | DIASTOLIC BLOOD PRESSURE: 82 MMHG

## 2024-06-03 DIAGNOSIS — J06.9 ACUTE URI: Primary | ICD-10-CM

## 2024-06-03 PROCEDURE — 99214 OFFICE O/P EST MOD 30 MIN: CPT | Performed by: INTERNAL MEDICINE

## 2024-06-03 RX ORDER — AZITHROMYCIN 250 MG/1
TABLET, FILM COATED ORAL
Qty: 6 TABLET | Refills: 0 | Status: SHIPPED | OUTPATIENT
Start: 2024-06-03

## 2024-06-03 RX ORDER — ATORVASTATIN CALCIUM 40 MG/1
40 TABLET, FILM COATED ORAL DAILY
COMMUNITY
Start: 2024-05-20

## 2024-06-03 NOTE — PROGRESS NOTES
"Chief Complaint  Nasal Congestion (Running like water out his nose/Started Friday night ) and Cough (Come and goes /Can't get hardly any rest )    Subjective          Juan Miguel Chairez presents to [facility]  History of Present Illness  Patient reports having cough, rhinorrhea, and congestion for 4 days. Patient reports unable to sleep. Patient denies shortness of breath, chest pain. Patient has not taken any medications at this time. Patient denies vomiting or diarrhea. Patient unaware of sick contacts. Patient has lost about 10 lbs in last 3 months.     Current Outpatient Medications   Medication Instructions    Aspirin Low Dose 81 MG EC tablet TAKE ONE TABLET BY MOUTH DAILY    atorvastatin (LIPITOR) 40 mg, Oral, Daily    azithromycin (Zithromax Z-Ajay) 250 MG tablet Take 2 tablets by mouth on day 1, then 1 tablet daily on days 2-5    levothyroxine (SYNTHROID, LEVOTHROID) 150 mcg, Oral, Daily    metoprolol tartrate (LOPRESSOR) 100 mg, Oral, Every 12 Hours    simvastatin (ZOCOR) 40 mg, Oral, Daily       The following portions of the patient's history were reviewed and updated as appropriate: allergies, current medications, past family history, past medical history, past social history, past surgical history, and problem list.    Objective   Vital Signs:   /82 (BP Location: Left arm)   Pulse 80   Temp 97.8 °F (36.6 °C) (Temporal)   Ht 170.2 cm (67\")   Wt 74.6 kg (164 lb 6.4 oz)   SpO2 94%   BMI 25.75 kg/m²     Wt Readings from Last 3 Encounters:   06/03/24 74.6 kg (164 lb 6.4 oz)   04/25/24 74.8 kg (165 lb)   04/10/24 74.8 kg (165 lb)     BP Readings from Last 3 Encounters:   06/03/24 153/82   04/25/24 146/86   04/10/24 115/71       Physical Exam   Appearance: No acute distress, well-nourished  Head: normocephalic, atraumatic  Eyes: extraocular movements intact, no scleral icterus, no conjunctival injection  Ears, Nose, and Throat: external ears normal, nares " patent, moist mucous membranes, tympanic membranes clear bilaterally. Tonsils within normal limits. Oropharynx clear.   Cardiovascular: regular rate and rhythm. no murmurs, rubs, or gallops. no edema  Respiratory: breathing comfortably, symmetric chest rise, clear to auscultation bilaterally. No wheezes, rales, or rhonchi.  Neuro: alert and moves all extremities equally  Lymph: no occipital, cervical, submandibular,or supraclavicular lymphadenopathy.      Result Review :   The following data was reviewed by: Alex South Jr, MD on 06/03/2024:  Common labs          1/9/2024    10:49 3/4/2024    14:50 3/11/2024    22:59   Common Labs   Glucose 85  79  143    BUN 9  16  17    Creatinine 0.75  0.83  0.91    Sodium 139  131  134    Potassium 4.5  4.9  4.8    Chloride 101  97  98    Calcium 8.7  8.6  8.8    Albumin  3.3  3.4    Total Bilirubin  0.5  0.6    Alkaline Phosphatase  152  159    AST (SGOT)  32  26    ALT (SGPT)  26  19    WBC 8.00  9.51  9.94    Hemoglobin 13.5  10.0  9.7    Hematocrit 40.7  31.6  31.6    Platelets 310  728  444        Lab Results   Component Value Date    SARSANTIGEN Detected (A) 07/18/2022    COVID19 NOT DETECTED 11/17/2020    FLUAAG Not Detected 07/18/2022    FLUBAG Not Detected 07/18/2022    INR 1.46 (H) 03/11/2024    BILIRUBINUR Negative 05/24/2021          Assessment and Plan    Diagnoses and all orders for this visit:    1. Acute URI (Primary)  Comments:  given duration of symptoms, will Rx azithromycin. call or RTC with concerns.  Orders:  -     azithromycin (Zithromax Z-Ajay) 250 MG tablet; Take 2 tablets by mouth on day 1, then 1 tablet daily on days 2-5  Dispense: 6 tablet; Refill: 0        There are no discontinued medications.     Follow Up   Return if symptoms worsen or fail to improve.  Patient was given instructions and counseling regarding his condition or for health maintenance advice. Please see specific information pulled into the AVS if appropriate.       Alex  Christ South Jr, MD  06/03/24  16:57 EDT

## 2024-06-04 ENCOUNTER — TELEPHONE (OUTPATIENT)
Dept: INTERNAL MEDICINE | Facility: CLINIC | Age: 64
End: 2024-06-04
Payer: COMMERCIAL

## 2024-06-04 RX ORDER — PREDNISONE 20 MG/1
40 TABLET ORAL DAILY
Qty: 10 TABLET | Refills: 0 | Status: SHIPPED | OUTPATIENT
Start: 2024-06-04 | End: 2024-06-09

## 2024-06-04 NOTE — TELEPHONE ENCOUNTER
"    Caller: Juan Miguel Chairez \"Harjinder\"    Relationship: Self    Best call back number: [phone]     What medication are you requesting: SOMETHING FOR COUGH    What are your current symptoms: COUGHING NON STOP    How long have you been experiencing symptoms: ON AND OFF FOR A WHILE    Have you had these symptoms before:    [x] Yes  [] No    Have you been treated for these symptoms before:   [x] Yes  [] No    If a prescription is needed, what is your preferred pharmacy and phone number: [facility] [id number]  [place], [place] - [address] AT [facility] [address] - [phone] [facility] [phone] FX     Additional notes: PATIENT STATES THAT FOR A HALF HOUR, HE WAS COUGHING CONSTANTLY WHILE DRIVING BETWEEN [place] AND [place], THEN HE STARTED COUGHING ON HIS WAY FROM [place] TO [place]. PATIENT STATES THAT IT SCARED HIM TO CONTINUE DRIVING. PATIENT WAS IN THE OFFICE ON 06.03.2024 AND WAS PRESCRIBED MEDICATIONS BUT IS CONCERNED THAT HIS COUGH HAS GOTTEN WORSE. PATIENT STATES HE IS WILLING TO COME BACK INTO THE OFFICE IF NECESSARY. PATIENT WANTING TO SPEAK WITH CLINICAL STAFF AS SOON AS POSSIBLE. PATIENT STATES THAT HE HAS ALSO ALMOST LOST HIS VOICE. PLEASE ADVISE.             "

## 2024-06-04 NOTE — TELEPHONE ENCOUNTER
"Caller: Juan Miguel Chairez \"Harjinder\"    Relationship: Self    Best call back number: [phone]     What is the best time to reach you: ANY    Who are you requesting to speak with (clinical staff, provider,  specific staff member): CLINICAL STAFF    What was the call regarding: PATIENT CALLED STATING THAT HE IS NOT FEELING BETTER AFTER TAKING HIS MEDICATION YESTERDAY. HE WOULD LIKE TO SEE WHAT ELSE DR JEFFRIES CAN PRESCRIBE OR RECOMMEND FOR HIM TO DO  "

## 2024-06-28 ENCOUNTER — TELEPHONE (OUTPATIENT)
Dept: GASTROENTEROLOGY | Facility: CLINIC | Age: 64
End: 2024-06-28
Payer: COMMERCIAL

## 2024-06-28 NOTE — TELEPHONE ENCOUNTER
Attempted to contact the patient for his telephone appointment. LVM for the patient to contact our office.

## 2024-07-25 ENCOUNTER — OFFICE VISIT (OUTPATIENT)
Dept: INTERNAL MEDICINE | Facility: CLINIC | Age: 64
End: 2024-07-25
Payer: COMMERCIAL

## 2024-07-25 VITALS
OXYGEN SATURATION: 98 % | SYSTOLIC BLOOD PRESSURE: 145 MMHG | DIASTOLIC BLOOD PRESSURE: 81 MMHG | HEART RATE: 75 BPM | WEIGHT: 164.5 LBS | BODY MASS INDEX: 25.82 KG/M2 | HEIGHT: 67 IN | TEMPERATURE: 98 F

## 2024-07-25 DIAGNOSIS — I10 ESSENTIAL HYPERTENSION: Primary | ICD-10-CM

## 2024-07-25 DIAGNOSIS — I35.0 NONRHEUMATIC AORTIC VALVE STENOSIS: ICD-10-CM

## 2024-07-25 PROCEDURE — 99214 OFFICE O/P EST MOD 30 MIN: CPT | Performed by: INTERNAL MEDICINE

## 2024-07-25 RX ORDER — METOPROLOL TARTRATE 50 MG/1
50 TABLET, FILM COATED ORAL EVERY 12 HOURS
Qty: 180 TABLET | Refills: 1 | Status: SHIPPED | OUTPATIENT
Start: 2024-07-25

## 2024-07-25 NOTE — PROGRESS NOTES
"Chief Complaint  Hypertension (Follow up ) and bp meds (Wants to talk about his BP medication /He is thinking he might be getting too much of the medication. )    Subjective          Juan Miguel Chairez presents to [facility]  History of Present Illness  Hypertension- patient reports having intermittent dizziness while walking. Patient does check Blood Pressure at home intermittently.   AS status post repair - patient reports having dizziness with exertion. He called the cardiology office about symptoms. Patient was on higher dose of toprol since his procedure.     Current Outpatient Medications   Medication Instructions    Aspirin Low Dose 81 MG EC tablet TAKE ONE TABLET BY MOUTH DAILY    atorvastatin (LIPITOR) 40 mg, Oral, Daily    levothyroxine (SYNTHROID, LEVOTHROID) 150 mcg, Oral, Daily    metoprolol tartrate (LOPRESSOR) 50 mg, Oral, Every 12 Hours    simvastatin (ZOCOR) 40 mg, Oral, Daily       The following portions of the patient's history were reviewed and updated as appropriate: allergies, current medications, past family history, past medical history, past social history, past surgical history, and problem list.    Objective   Vital Signs:   /81   Pulse 75   Temp 98 °F (36.7 °C)   Ht 170.2 cm (67\")   Wt 74.6 kg (164 lb 8 oz)   SpO2 98%   BMI 25.76 kg/m²     BP Readings from Last 3 Encounters:   07/25/24 145/81   06/03/24 153/82   04/25/24 146/86     Wt Readings from Last 3 Encounters:   07/25/24 74.6 kg (164 lb 8 oz)   06/03/24 74.6 kg (164 lb 6.4 oz)   04/25/24 74.8 kg (165 lb)         Physical Exam   Appearance: No acute distress, well-nourished  Head: normocephalic, atraumatic  Eyes: extraocular movements intact, no scleral icterus, no conjunctival injection  Ears, Nose, and Throat: external ears normal, nares patent, moist mucous membranes  Cardiovascular: regular rate and rhythm. no murmurs, rubs, or gallops. no edema  Respiratory: breathing " comfortably, symmetric chest rise, clear to auscultation bilaterally. No wheezes, rales, or rhonchi.  Neuro: alert and oriented to time, place, and person. Normal gait  Psych: normal mood and affect       Result Review :   The following data was reviewed by: Alex South Jr, MD on 07/25/2024:  Common labs          1/9/2024    10:49 3/4/2024    14:50 3/11/2024    22:59   Common Labs   Glucose 85  79  143    BUN 9  16  17    Creatinine 0.75  0.83  0.91    Sodium 139  131  134    Potassium 4.5  4.9  4.8    Chloride 101  97  98    Calcium 8.7  8.6  8.8    Albumin  3.3  3.4    Total Bilirubin  0.5  0.6    Alkaline Phosphatase  152  159    AST (SGOT)  32  26    ALT (SGPT)  26  19    WBC 8.00  9.51  9.94    Hemoglobin 13.5  10.0  9.7    Hematocrit 40.7  31.6  31.6    Platelets 310  728  444        Lab Results   Component Value Date    SARSANTIGEN Detected (A) 07/18/2022    COVID19 NOT DETECTED 11/17/2020    FLUAAG Not Detected 07/18/2022    FLUBAG Not Detected 07/18/2022    INR 1.46 (H) 03/11/2024    BILIRUBINUR Negative 05/24/2021          Assessment and Plan    Diagnoses and all orders for this visit:    1. Essential hypertension (Primary)  Comments:  due to dizziness, reduce metoprol to 50mg BID. may restart losartan for BP control in future if needed. f/u in 2 weeks. encouraged home BP readings.  Orders:  -     metoprolol tartrate (LOPRESSOR) 50 MG tablet; Take 1 tablet by mouth Every 12 (Twelve) Hours.  Dispense: 180 tablet; Refill: 1    2. Nonrheumatic aortic valve stenosis  Comments:  s/p repair. may consider repeat echo if no improvement.        Medications Discontinued During This Encounter   Medication Reason    azithromycin (Zithromax Z-Ajay) 250 MG tablet *Therapy completed    metoprolol tartrate (LOPRESSOR) 100 MG tablet         Follow Up   Return in about 2 weeks (around 8/8/2024) for HTN.  Patient was given instructions and counseling regarding his condition or for health maintenance advice. Please  see specific information pulled into the AVS if appropriate.       Alex South Jr, MD  07/25/24  10:27 EDT

## 2024-08-07 NOTE — PROGRESS NOTES
"Chief Complaint  Hypertension (Follow up )    Subjective          Juan Miguel Chairez presents to [facility]  History of Present Illness  Hypertension- patient reports feeling better since cutting back on metoprolol. He has follow-up with cardiology in 10/2024. Patient reports feeling stronger. He denies dizziness and chest pain. Patient reports he has felt well enough to start regular exercise again.     Current Outpatient Medications   Medication Instructions    Aspirin Low Dose 81 MG EC tablet TAKE ONE TABLET BY MOUTH DAILY    atorvastatin (LIPITOR) 40 mg, Oral, Daily    levothyroxine (SYNTHROID, LEVOTHROID) 150 mcg, Oral, Daily    metoprolol tartrate (LOPRESSOR) 50 mg, Oral, Every 12 Hours    simvastatin (ZOCOR) 40 mg, Oral, Daily       The following portions of the patient's history were reviewed and updated as appropriate: allergies, current medications, past family history, past medical history, past social history, past surgical history, and problem list.    Objective   Vital Signs:   /80 (BP Location: Left arm)   Pulse 86   Temp 97.3 °F (36.3 °C) (Temporal)   Ht 170.2 cm (67\")   Wt 74.6 kg (164 lb 8 oz)   SpO2 97%   BMI 25.76 kg/m²     BP Readings from Last 3 Encounters:   08/09/24 116/80   07/25/24 145/81   06/03/24 153/82     Wt Readings from Last 3 Encounters:   08/09/24 74.6 kg (164 lb 8 oz)   07/25/24 74.6 kg (164 lb 8 oz)   06/03/24 74.6 kg (164 lb 6.4 oz)         Physical Exam   Appearance: No acute distress, well-nourished  Head: normocephalic, atraumatic  Eyes: extraocular movements intact, no scleral icterus, no conjunctival injection  Ears, Nose, and Throat: external ears normal, nares patent, moist mucous membranes  Cardiovascular: regular rate. no edema  Respiratory: breathing comfortably, symmetric chest rise  Neuro: alert and oriented to time, place, and person. Normal gait  Psych: normal mood and affect       Result Review :   The " following data was reviewed by: Alex South Jr, MD on 08/09/2024:  Common labs          1/9/2024    10:49 3/4/2024    14:50 3/11/2024    22:59   Common Labs   Glucose 85  79  143    BUN 9  16  17    Creatinine 0.75  0.83  0.91    Sodium 139  131  134    Potassium 4.5  4.9  4.8    Chloride 101  97  98    Calcium 8.7  8.6  8.8    Albumin  3.3  3.4    Total Bilirubin  0.5  0.6    Alkaline Phosphatase  152  159    AST (SGOT)  32  26    ALT (SGPT)  26  19    WBC 8.00  9.51  9.94    Hemoglobin 13.5  10.0  9.7    Hematocrit 40.7  31.6  31.6    Platelets 310  728  444        Lab Results   Component Value Date    SARSANTIGEN Detected (A) 07/18/2022    COVID19 NOT DETECTED 11/17/2020    FLUAAG Not Detected 07/18/2022    FLUBAG Not Detected 07/18/2022    INR 1.46 (H) 03/11/2024    BILIRUBINUR Negative 05/24/2021          Assessment and Plan    Diagnoses and all orders for this visit:    1. Essential hypertension (Primary)  Comments:  still well controlled. pt feels better on lower dose of metoprolol. f/u with cards in 2 month.s  Orders:  -     CBC & Differential; Future  -     Comprehensive Metabolic Panel; Future    2. Acquired hypothyroidism  -     TSH Rfx On Abnormal To Free T4; Future    3. Mixed hyperlipidemia  -     Lipid Panel; Future          There are no discontinued medications.       Follow Up   Return in about 6 months (around 2/9/2025) for HTN.  Patient was given instructions and counseling regarding his condition or for health maintenance advice. Please see specific information pulled into the AVS if appropriate.       Alex South Jr, MD  08/09/24  10:21 EDT

## 2024-08-09 ENCOUNTER — OFFICE VISIT (OUTPATIENT)
Dept: INTERNAL MEDICINE | Facility: CLINIC | Age: 64
End: 2024-08-09
Payer: COMMERCIAL

## 2024-08-09 VITALS
WEIGHT: 164.5 LBS | TEMPERATURE: 97.3 F | BODY MASS INDEX: 25.82 KG/M2 | DIASTOLIC BLOOD PRESSURE: 80 MMHG | OXYGEN SATURATION: 97 % | HEIGHT: 67 IN | SYSTOLIC BLOOD PRESSURE: 116 MMHG | HEART RATE: 86 BPM

## 2024-08-09 DIAGNOSIS — E78.2 MIXED HYPERLIPIDEMIA: ICD-10-CM

## 2024-08-09 DIAGNOSIS — I10 ESSENTIAL HYPERTENSION: Primary | ICD-10-CM

## 2024-08-09 DIAGNOSIS — E03.9 ACQUIRED HYPOTHYROIDISM: ICD-10-CM

## 2024-08-09 PROCEDURE — 99213 OFFICE O/P EST LOW 20 MIN: CPT | Performed by: INTERNAL MEDICINE

## 2024-10-23 ENCOUNTER — LAB (OUTPATIENT)
Dept: LAB | Facility: HOSPITAL | Age: 64
End: 2024-10-23
Payer: COMMERCIAL

## 2024-10-23 DIAGNOSIS — I10 ESSENTIAL HYPERTENSION: ICD-10-CM

## 2024-10-23 DIAGNOSIS — E78.2 MIXED HYPERLIPIDEMIA: ICD-10-CM

## 2024-10-23 DIAGNOSIS — E03.9 ACQUIRED HYPOTHYROIDISM: ICD-10-CM

## 2024-10-23 LAB
BASOPHILS # BLD AUTO: 0.09 10*3/MM3 (ref 0–0.2)
BASOPHILS NFR BLD AUTO: 1 % (ref 0–1.5)
DEPRECATED RDW RBC AUTO: 49.1 FL (ref 37–54)
EOSINOPHIL # BLD AUTO: 0.09 10*3/MM3 (ref 0–0.4)
EOSINOPHIL NFR BLD AUTO: 1 % (ref 0.3–6.2)
ERYTHROCYTE [DISTWIDTH] IN BLOOD BY AUTOMATED COUNT: 17.6 % (ref 12.3–15.4)
HCT VFR BLD AUTO: 37.8 % (ref 37.5–51)
HGB BLD-MCNC: 10.8 G/DL (ref 13–17.7)
IMM GRANULOCYTES # BLD AUTO: 0.02 10*3/MM3 (ref 0–0.05)
IMM GRANULOCYTES NFR BLD AUTO: 0.2 % (ref 0–0.5)
LYMPHOCYTES # BLD AUTO: 2.62 10*3/MM3 (ref 0.7–3.1)
LYMPHOCYTES NFR BLD AUTO: 28.5 % (ref 19.6–45.3)
MCH RBC QN AUTO: 22.2 PG (ref 26.6–33)
MCHC RBC AUTO-ENTMCNC: 28.6 G/DL (ref 31.5–35.7)
MCV RBC AUTO: 77.8 FL (ref 79–97)
MONOCYTES # BLD AUTO: 1.2 10*3/MM3 (ref 0.1–0.9)
MONOCYTES NFR BLD AUTO: 13.1 % (ref 5–12)
NEUTROPHILS NFR BLD AUTO: 5.17 10*3/MM3 (ref 1.7–7)
NEUTROPHILS NFR BLD AUTO: 56.2 % (ref 42.7–76)
NRBC BLD AUTO-RTO: 0 /100 WBC (ref 0–0.2)
PLATELET # BLD AUTO: 406 10*3/MM3 (ref 140–450)
PMV BLD AUTO: 10.6 FL (ref 6–12)
RBC # BLD AUTO: 4.86 10*6/MM3 (ref 4.14–5.8)
WBC NRBC COR # BLD AUTO: 9.19 10*3/MM3 (ref 3.4–10.8)

## 2024-10-23 PROCEDURE — 80061 LIPID PANEL: CPT

## 2024-10-23 PROCEDURE — 80050 GENERAL HEALTH PANEL: CPT

## 2024-10-23 PROCEDURE — 36415 COLL VENOUS BLD VENIPUNCTURE: CPT

## 2024-10-24 ENCOUNTER — TELEPHONE (OUTPATIENT)
Dept: GASTROENTEROLOGY | Facility: CLINIC | Age: 64
End: 2024-10-24
Payer: COMMERCIAL

## 2024-10-24 LAB
ALBUMIN SERPL-MCNC: 4.1 G/DL (ref 3.5–5.2)
ALBUMIN/GLOB SERPL: 1.3 G/DL
ALP SERPL-CCNC: 119 U/L (ref 39–117)
ALT SERPL W P-5'-P-CCNC: 24 U/L (ref 1–41)
ANION GAP SERPL CALCULATED.3IONS-SCNC: 10.3 MMOL/L (ref 5–15)
AST SERPL-CCNC: 30 U/L (ref 1–40)
BILIRUB SERPL-MCNC: 0.6 MG/DL (ref 0–1.2)
BUN SERPL-MCNC: 11 MG/DL (ref 8–23)
BUN/CREAT SERPL: 10.3 (ref 7–25)
CALCIUM SPEC-SCNC: 9.5 MG/DL (ref 8.6–10.5)
CHLORIDE SERPL-SCNC: 100 MMOL/L (ref 98–107)
CHOLEST SERPL-MCNC: 123 MG/DL (ref 0–200)
CO2 SERPL-SCNC: 25.7 MMOL/L (ref 22–29)
CREAT SERPL-MCNC: 1.07 MG/DL (ref 0.76–1.27)
EGFRCR SERPLBLD CKD-EPI 2021: 78.5 ML/MIN/1.73
GLOBULIN UR ELPH-MCNC: 3.2 GM/DL
GLUCOSE SERPL-MCNC: 90 MG/DL (ref 65–99)
HDLC SERPL-MCNC: 42 MG/DL (ref 40–60)
LDLC SERPL CALC-MCNC: 67 MG/DL (ref 0–100)
LDLC/HDLC SERPL: 1.6 {RATIO}
POTASSIUM SERPL-SCNC: 4.9 MMOL/L (ref 3.5–5.2)
PROT SERPL-MCNC: 7.3 G/DL (ref 6–8.5)
SODIUM SERPL-SCNC: 136 MMOL/L (ref 136–145)
TRIGL SERPL-MCNC: 69 MG/DL (ref 0–150)
TSH SERPL DL<=0.05 MIU/L-ACNC: 0.69 UIU/ML (ref 0.27–4.2)
VLDLC SERPL-MCNC: 14 MG/DL (ref 5–40)

## 2024-10-24 NOTE — TELEPHONE ENCOUNTER
Call placed to patient in regard to 11/25/24 Nurse/MA screening.   Sooner appointments are available-Left VM for call back if he wishes to move appt up.

## 2024-10-28 NOTE — PROGRESS NOTES
Date of Office Visit: 10/29/2024  Encounter Provider: Dr. Froy Mclain  Place of Service: [facility] [place]  Patient Name: Juan Miguel Chairez  :1960  Alex South Jr., MD    Chief Complaint   Patient presents with    Coronary Artery Disease    Hypertension    Hyperlipidemia    Follow-up     History of Present Illness    I am pleased to see Mr. Chairez in my office today for a follow-up     As you know, patient is 64 years old white gentleman whose past medical history is significant for hypertension, hyperlipidemia, diabetes mellitus, coronary artery disease, status post coronary artery bypass graft, cardiomyopathy, who came today for yearly  followup.     In , patient had cardiac workup for his symptom of chest pain.  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  Patient underwent cardiac catheterization which showed 100% lad stenosis, 25% LCX stenosis, RCA had 90% stenosis.  Patient underwent  left ventricle aneurysmectomy and CABG. In 2017, patient had echocardiogram which showed EF of 55% and akinetic distal anterior wall.  Mild aortic stenosis noted.     In 2021, patient underwent echocardiogram which showed EF of 50 to 55%.  Severe aortic stenosis noted with aortic valve area of 0.83 cm² and peak velocity of 3.5 m/s noted.  Peak gradient was 50 mmHg and mean gradient was 30.4 mmHg.  Patient was asymptomatic and recommended to have observation.    In 2024, patient underwent JOSE ANTONIO which showed normal left ventricular size and function with a EF of 55 to 60%.  Moderate to severe left atrial enlargement was noted.  Severe aortic stenosis was noted with peak velocity of 4.0 cm² with peak gradient of 66 mmHg and mean gradient of 38 mmHg.  Patient was noted to have moderate to severe mitral regurgitation and also moderate mitral stenosis with peak gradient of 22 mmHg and mean gradient of 9 mmHg.  Patient was recommended to proceed with  cardiac catheterization.  Which showed left main was normal, proximal LAD has 70% stenosis followed by  occlusion.  LIMA to LAD is patent.  LCx has 80% stenosis RCA was 100% occluded SVG graft to right coronary artery patent.  LCx has 70% stenosis which was not grafted.    In February 2024, patient underwent mitral and aortic valve replacement at [facility].  Patient did well postoperatively.  Patient had postoperative atrial fibrillation and subsequently sinus rhythm was achieved.  Patient is off Eliquis.    Patient came today for follow-up.  Patient is doing well.  He has started walking with his dog without any trouble.  Patient is not doing treadmill for some reason.  I encouraged him to start treadmill again.  Patient denies any chest pain or tightness or heaviness.  No shortness of breath.  No orthopnea PND no syncope or presyncope.    At this stage, I would continue current treatment.  Blood pressure is high today but all blood pressure reading at home are within desirable range patient had recent echocardiogram at [facility] and ejection fraction was 55%.  Both prosthetic valve mitral and aortic valves were functioning appropriately.      Past Medical History:   Diagnosis Date    Benign essential HTN     CAD (coronary artery disease) of artery bypass graft     Cardiomyopathy     Coronary artery disease     DM2 (diabetes mellitus, type 2)     Hodgkin's lymphoma 1984    Malignant    Hyperlipidemia, mixed     Hypothyroidism     Type 2 diabetes mellitus 10/15/2020         Past Surgical History:   Procedure Laterality Date    ABDOMINAL AORTIC ANEURYSM REPAIR  2011    APPENDECTOMY      CARDIAC CATHETERIZATION      CARDIAC SURGERY      CARDIAC VALVE REPLACEMENT      COLONOSCOPY  2013    [facility]    CORONARY ARTERY BYPASS GRAFT  2010    MITRAL VALVE REPLACEMENT  2024    SPLENECTOMY  1983           Current Outpatient Medications:     Aspirin Low Dose 81 MG EC tablet, TAKE ONE TABLET BY MOUTH DAILY,  Disp: 90 tablet, Rfl: 3    atorvastatin (LIPITOR) 40 MG tablet, Take 1 tablet by mouth Daily., Disp: , Rfl:     levothyroxine (SYNTHROID, LEVOTHROID) 150 MCG tablet, Take 1 tablet by mouth Daily., Disp: 90 tablet, Rfl: 3    metoprolol tartrate (LOPRESSOR) 50 MG tablet, Take 1 tablet by mouth Every 12 (Twelve) Hours., Disp: 180 tablet, Rfl: 1    simvastatin (ZOCOR) 40 MG tablet, Take 1 tablet by mouth Daily., Disp: 90 tablet, Rfl: 3      Social History     Socioeconomic History    Marital status:    Tobacco Use    Smoking status: Former     Current packs/day: 0.00     Types: Cigarettes     Quit date:      Years since quittin.8     Passive exposure: Past    Smokeless tobacco: Former    Tobacco comments:     less than 1ppd   Vaping Use    Vaping status: Never Used   Substance and Sexual Activity    Alcohol use: Yes     Comment: Drink two (2) to three (3) times a year    Drug use: Never    Sexual activity: Defer         Review of Systems   Constitutional: Negative for chills and fever.   HENT:  Negative for ear discharge and nosebleeds.    Eyes:  Negative for discharge and redness.   Cardiovascular:  Negative for chest pain, orthopnea, palpitations, paroxysmal nocturnal dyspnea and syncope.   Respiratory:  Negative for cough, shortness of breath and wheezing.    Endocrine: Negative for heat intolerance.   Skin:  Negative for rash.   Musculoskeletal:  Negative for arthritis and myalgias.   Gastrointestinal:  Negative for abdominal pain, melena, nausea and vomiting.   Genitourinary:  Negative for dysuria and hematuria.   Neurological:  Negative for dizziness, light-headedness, numbness and tremors.   Psychiatric/Behavioral:  Negative for depression. The patient is not nervous/anxious.        Procedures      ECG 12 Lead    Date/Time: 10/29/2024 1:32 PM  Performed by: Froy Mclain MD    Authorized by: Froy Mclain MD  Comparison: compared with previous ECG   Similar to previous ECG  Rhythm: sinus  "rhythm  Other findings: non-specific ST-T wave changes, T wave abnormality and left ventricular hypertrophy    Clinical impression: abnormal EKG          ECG 12 Lead    (Results Pending)           Objective:    /82 (BP Location: Right arm, Patient Position: Sitting, Cuff Size: Large Adult)   Pulse 70   Resp 16   Ht 170 cm (66.93\")   Wt 74.4 kg (164 lb)   SpO2 100%   BMI 25.74 kg/m²         Constitutional:       Appearance: Well-developed.   Eyes:      General: No scleral icterus.        Right eye: No discharge.   HENT:      Head: Normocephalic and atraumatic.   Neck:      Thyroid: No thyromegaly.      Lymphadenopathy: No cervical adenopathy.   Pulmonary:      Effort: Pulmonary effort is normal. No respiratory distress.      Breath sounds: Normal breath sounds. No wheezing. No rales.   Cardiovascular:      Normal rate. Regular rhythm.      No gallop.    Edema:     Peripheral edema absent.   Abdominal:      Tenderness: There is no abdominal tenderness.   Skin:     Findings: No erythema or rash.   Neurological:      Mental Status: Alert and oriented to person, place, and time.             Assessment:       Diagnosis Plan   1. Coronary artery disease involving native coronary artery of native heart without angina pectoris  ECG 12 Lead      2. Essential hypertension  ECG 12 Lead      3. Mixed hyperlipidemia  ECG 12 Lead      4. Nonrheumatic aortic valve stenosis                 Plan:       MDM:    1.  CAD/coronary artery bypass grafting:    Patient is clinically doing well.  At this stage current treatment would be continued    2.  Aortic stenosis status post AVR:    Recent echocardiogram showed it is functioning appropriately.    3.  Mitral regurgitation/stenosis:    At this stage, patient is clinically doing well.  Recent echocardiogram showed bioprosthetic valve is functioning appropriately recommend observation    4.  Hypertension:    Blood pressure is controlled at home.  I suspect whitecoat hypertension " continue to observe

## 2024-10-29 ENCOUNTER — OFFICE VISIT (OUTPATIENT)
Dept: CARDIOLOGY | Facility: CLINIC | Age: 64
End: 2024-10-29
Payer: COMMERCIAL

## 2024-10-29 VITALS
DIASTOLIC BLOOD PRESSURE: 80 MMHG | HEART RATE: 70 BPM | SYSTOLIC BLOOD PRESSURE: 128 MMHG | HEIGHT: 67 IN | RESPIRATION RATE: 16 BRPM | OXYGEN SATURATION: 100 % | WEIGHT: 164 LBS | BODY MASS INDEX: 25.74 KG/M2

## 2024-10-29 DIAGNOSIS — I10 ESSENTIAL HYPERTENSION: ICD-10-CM

## 2024-10-29 DIAGNOSIS — I25.10 CORONARY ARTERY DISEASE INVOLVING NATIVE CORONARY ARTERY OF NATIVE HEART WITHOUT ANGINA PECTORIS: Primary | ICD-10-CM

## 2024-10-29 DIAGNOSIS — I35.0 NONRHEUMATIC AORTIC VALVE STENOSIS: ICD-10-CM

## 2024-10-29 DIAGNOSIS — E78.2 MIXED HYPERLIPIDEMIA: ICD-10-CM

## 2024-10-29 PROCEDURE — 93000 ELECTROCARDIOGRAM COMPLETE: CPT | Performed by: INTERNAL MEDICINE

## 2024-10-29 PROCEDURE — 99214 OFFICE O/P EST MOD 30 MIN: CPT | Performed by: INTERNAL MEDICINE

## 2024-11-05 ENCOUNTER — CLINICAL SUPPORT (OUTPATIENT)
Dept: GASTROENTEROLOGY | Facility: CLINIC | Age: 64
End: 2024-11-05
Payer: COMMERCIAL

## 2024-11-05 ENCOUNTER — TELEPHONE (OUTPATIENT)
Dept: GASTROENTEROLOGY | Facility: CLINIC | Age: 64
End: 2024-11-05
Payer: COMMERCIAL

## 2024-11-05 ENCOUNTER — PREP FOR SURGERY (OUTPATIENT)
Dept: OTHER | Facility: HOSPITAL | Age: 64
End: 2024-11-05
Payer: COMMERCIAL

## 2024-11-05 DIAGNOSIS — Z12.11 COLON CANCER SCREENING: Primary | ICD-10-CM

## 2024-11-05 DIAGNOSIS — Z86.0100 HISTORY OF COLON POLYPS: ICD-10-CM

## 2024-11-05 RX ORDER — SODIUM, POTASSIUM,MAG SULFATES 17.5-3.13G
1 SOLUTION, RECONSTITUTED, ORAL ORAL EVERY 12 HOURS
Qty: 354 ML | Refills: 0 | Status: SHIPPED | OUTPATIENT
Start: 2024-11-05

## 2024-11-05 RX ORDER — LOSARTAN POTASSIUM 100 MG/1
100 TABLET ORAL DAILY
COMMUNITY
Start: 2024-07-17

## 2024-11-05 NOTE — PROGRESS NOTES
Juan Miguel LOYA Contreras  1960  64 y.o.    Reason for call: 5 year recall - LAST COLON 5/2019 KM, HX OF COLON POLYPS  Prep prescribed: Suprep  Prep instructions reviewed with patient and sent to patient via [employer]  Is the patient currently on any injectable or oral medications for weight loss or diabetes? No  Clearance needed? Yes  If yes, what clearance is needed? Cardiology  Clearance has been requested from DR. LEWIS  The patient has been scheduled for: Colonoscopy    Juan Miguel Chairez is aware they have been scheduled for a screening colonoscopy. Patient has expressed they are not having any symptoms at all.     After your procedure, you will be contacted with results. Please confirm the best phone # to reach the patient: [phone]  Family history of colon cancer? No  If yes, indicate relative: N/A  Tentative Procedure Date: 12/18/2024    Date/Place of last Scope: 5/2019, [facility]  Able to obtain report? yes    Family History   Problem Relation Age of Onset    Stroke Father     Colon cancer Neg Hx      Past Medical History:   Diagnosis Date    Benign essential HTN     CAD (coronary artery disease) of artery bypass graft     Cardiomyopathy     Colon polyp     Last colonoscopy    Coronary artery disease     DM2 (diabetes mellitus, type 2)     Hodgkin's lymphoma 1984    Malignant    Hyperlipidemia, mixed     Hypothyroidism     Type 2 diabetes mellitus 10/15/2020     No Known Allergies  Past Surgical History:   Procedure Laterality Date    ABDOMINAL AORTIC ANEURYSM REPAIR  2011    APPENDECTOMY      BARIATRIC SURGERY  2005    Lap band    CARDIAC CATHETERIZATION      CARDIAC SURGERY      CARDIAC VALVE REPLACEMENT      COLONOSCOPY  2013    [facility]    CORONARY ARTERY BYPASS GRAFT  2010    MITRAL VALVE REPLACEMENT  2024    SPLENECTOMY  1983     Social History     Socioeconomic History    Marital status:    Tobacco Use    Smoking status: Former     Current packs/day: 0.00     Average packs/day: 0.5 packs/day for 15.0 years  (7.5 ttl pk-yrs)     Types: Cigarettes     Quit date:      Years since quittin.8     Passive exposure: Past    Smokeless tobacco: Former    Tobacco comments:     Smoked cigarettes but didn’t inhale   Vaping Use    Vaping status: Never Used   Substance and Sexual Activity    Alcohol use: Not Currently     Comment: rarely    Drug use: Never    Sexual activity: Yes     Partners: Female       Current Outpatient Medications:     Aspirin Low Dose 81 MG EC tablet, TAKE ONE TABLET BY MOUTH DAILY, Disp: 90 tablet, Rfl: 3    atorvastatin (LIPITOR) 40 MG tablet, Take 1 tablet by mouth Daily., Disp: , Rfl:     levothyroxine (SYNTHROID, LEVOTHROID) 150 MCG tablet, Take 1 tablet by mouth Daily., Disp: 90 tablet, Rfl: 3    losartan (COZAAR) 100 MG tablet, Take 1 tablet by mouth Daily., Disp: , Rfl:     metoprolol tartrate (LOPRESSOR) 50 MG tablet, Take 1 tablet by mouth Every 12 (Twelve) Hours., Disp: 180 tablet, Rfl: 1    simvastatin (ZOCOR) 40 MG tablet, Take 1 tablet by mouth Daily., Disp: 90 tablet, Rfl: 3

## 2024-11-05 NOTE — TELEPHONE ENCOUNTER
11/5/2024    Dear Dr Mclain,     Patient: Juan Miguel Chairez   YOB: 1960        This patient is scheduled to have a Colonoscopy which I will perform at [facility] on 12.18.24.     Please respond to this request noting your recommendations. You may contact our office at [phone] Option 1 with any questions. I appreciate your prompt response in this matter.     Please return this form to our office no later than two weeks prior to the procedure date listed above. Please return form to [phone].     ____ I approve my patient from a Cardiac  standpoint    ____ I do NOT approve my patient from a Cardiac  standpoint at this time      Please specify clearance expiration date:____________________________________      Approving physician name (please print): _____________________________________________      Approving physician signature: ________________________________ Date:________________    Sincerely,  [facility] - Gastroenterology   Dr. Radhames Garcia      Please fax approval or denial to our office as soon as possible.

## 2024-11-14 RX ORDER — LEVOTHYROXINE SODIUM 150 UG/1
150 TABLET ORAL DAILY
Qty: 90 TABLET | Refills: 3 | Status: SHIPPED | OUTPATIENT
Start: 2024-11-14

## 2024-11-18 NOTE — PROGRESS NOTES
"Chief Complaint  Neck Pain (For a few months )    Subjective          Juan Miguel Chairez presents to [facility]  Today's concern : Neck pain.   Symptoms are recurrent.   Onset was 6 to 12 months.   Symptoms occur daily.   Symptoms include neck pain.  Pertinent negative symptoms include no abdominal pain, no anorexia, no joint pain, no change in stool, no chest pain, no chills, no congestion, no cough, no diaphoresis, no fatigue, no fever, no headaches, no joint swelling, no myalgias, no nausea, no numbness, no rash, no sore throat, no swollen glands, no dysuria, no vertigo, no visual change, no vomiting and no weakness.   Treatment and/or Medications comments include: None       History of Present Illness    Had valve surgery in February of 2024  Had JOSE ANTONIO and wanted them to replace valve   [facility] in [facility]   Radiation heart disease     Current Outpatient Medications   Medication Instructions    Aspirin Low Dose 81 MG EC tablet TAKE ONE TABLET BY MOUTH DAILY    atorvastatin (LIPITOR) 40 mg, Daily    Diclofenac Sodium (VOLTAREN) 4 g, Topical, 4 Times Daily PRN    levothyroxine (SYNTHROID, LEVOTHROID) 150 mcg, Oral, Daily    losartan (COZAAR) 100 MG tablet Take 1 tablet by mouth Daily.    metoprolol tartrate (LOPRESSOR) 50 mg, Oral, Every 12 Hours    simvastatin (ZOCOR) 40 mg, Oral, Daily    sodium-potassium-magnesium sulfates (Suprep Bowel Prep Kit) 17.5-3.13-1.6 GM/177ML solution oral solution 1 bottle, Oral, Every 12 Hours       The following portions of the patient's history were reviewed and updated as appropriate: allergies, current medications, past family history, past medical history, past social history, past surgical history, and problem list.    Objective   Vital Signs:   /75   Pulse 74   Ht 170.2 cm (67\")   Wt 74.4 kg (164 lb)   SpO2 100%   BMI 25.69 kg/m²     Wt Readings from Last 3 Encounters:   11/21/24 74.4 kg (164 lb)   10/29/24 " 74.4 kg (164 lb)   08/09/24 74.6 kg (164 lb 8 oz)     BP Readings from Last 3 Encounters:   11/21/24 122/75   10/29/24 128/80   08/09/24 116/80     Physical Exam  Neck:     Musculoskeletal:      Cervical back: Decreased range of motion.        Appearance: No acute distress, well-nourished  Head: normocephalic, atraumatic  Eyes: extraocular movements intact, no scleral icterus, no conjunctival injection  Ears, Nose, and Throat: external ears normal, nares patent, moist mucous membranes, tympanic membranes clear bilaterally. Tonsils within normal limits. Oropharynx clear.   Cardiovascular: regular rate and rhythm. no murmurs, rubs, or gallops. no edema  Respiratory: breathing comfortably, symmetric chest rise, clear to auscultation bilaterally. No wheezes, rales, or rhonchi.  Neuro: alert and moves all extremities equally  Lymph: no occipital, cervical, submandibular,or supraclavicular lymphadenopathy.      Result Review :   The following data was reviewed by: SHERI Bhat on 11/21/2024:  Common labs          3/4/2024    14:50 3/11/2024    22:59 10/23/2024    14:02   Common Labs   Glucose 79  143  90    BUN 16  17  11    Creatinine 0.83  0.91  1.07    Sodium 131  134  136    Potassium 4.9  4.8  4.9    Chloride 97  98  100    Calcium 8.6  8.8  9.5    Albumin 3.3  3.4  4.1    Total Bilirubin 0.5  0.6  0.6    Alkaline Phosphatase 152  159  119    AST (SGOT) 32  26  30    ALT (SGPT) 26  19  24    WBC 9.51  9.94  9.19    Hemoglobin 10.0  9.7  10.8    Hematocrit 31.6  31.6  37.8    Platelets 728  444  406    Total Cholesterol   123    Triglycerides   69    HDL Cholesterol   42    LDL Cholesterol    67             Lab Results   Component Value Date    SARSANTIGEN Detected (A) 07/18/2022    COVID19 NOT DETECTED 11/17/2020    FLUAAG Not Detected 07/18/2022    FLUBAG Not Detected 07/18/2022    INR 1.46 (H) 03/11/2024    BILIRUBINUR Negative 05/24/2021          Assessment and Plan    Diagnoses and all orders for  this visit:    1. Neck pain (Primary)  -     MRI Cervical Spine Without Contrast; Future  -     Ambulatory Referral to Physical Therapy for Evaluation & Treatment  -     Diclofenac Sodium (VOLTAREN) 1 % gel gel; Apply 4 g topically to the appropriate area as directed 4 (Four) Times a Day As Needed (pain).  Dispense: 350 g; Refill: 0    2. Cervical spine instability  -     Ambulatory Referral to Physical Therapy for Evaluation & Treatment        Assessment & Plan      There are no discontinued medications.       Follow Up   Return if symptoms worsen or fail to improve.  Patient was given instructions and counseling regarding his condition or for health maintenance advice. Please see specific information pulled into the AVS if appropriate.     Patient or patient representative verbalized consent for the use of Ambient Listening during the visit with  SHERI Bhat for chart documentation. 11/21/2024  14:14 SHERI Salazar  11/21/24  14:14 EST

## 2024-11-21 ENCOUNTER — OFFICE VISIT (OUTPATIENT)
Dept: INTERNAL MEDICINE | Facility: CLINIC | Age: 64
End: 2024-11-21
Payer: COMMERCIAL

## 2024-11-21 VITALS
WEIGHT: 164 LBS | OXYGEN SATURATION: 100 % | DIASTOLIC BLOOD PRESSURE: 75 MMHG | HEART RATE: 74 BPM | SYSTOLIC BLOOD PRESSURE: 122 MMHG | BODY MASS INDEX: 25.74 KG/M2 | HEIGHT: 67 IN

## 2024-11-21 DIAGNOSIS — M54.2 NECK PAIN: Primary | ICD-10-CM

## 2024-11-21 DIAGNOSIS — M53.2X2 CERVICAL SPINE INSTABILITY: ICD-10-CM

## 2024-11-21 PROCEDURE — 99213 OFFICE O/P EST LOW 20 MIN: CPT | Performed by: NURSE PRACTITIONER

## 2024-12-12 NOTE — PRE-PROCEDURE INSTRUCTIONS
"Instructed on date and arrival time of 0900. Instructed that arrival time is not their procedure time but allows time to prepare for procedure.  Come to entrance \"C\". Must have  over age 18 to drive home.  May have two visitors; however, children under 12 must stay in waiting room.  Discussed clear liquid diet (no red or purple), bowel prep, and NPO.  May take medications as usual except for blood thinners, diabetic medications, and weight loss medications.  Verbalized understanding of instructions given.  Instructed to call for questions or concerns.  Cardiac clearance noted in chart.  "

## 2024-12-17 ENCOUNTER — TELEPHONE (OUTPATIENT)
Dept: INTERNAL MEDICINE | Facility: CLINIC | Age: 64
End: 2024-12-17
Payer: COMMERCIAL

## 2024-12-17 ENCOUNTER — ANESTHESIA EVENT (OUTPATIENT)
Dept: GASTROENTEROLOGY | Facility: HOSPITAL | Age: 64
End: 2024-12-17
Payer: COMMERCIAL

## 2024-12-17 NOTE — TELEPHONE ENCOUNTER
----- Message from Maude Tang sent at 12/17/2024 10:50 AM EST -----  Mild disc bulge C6-C7  ----- Message -----  From: Jefferson Falk  Sent: 12/16/2024  12:35 PM EST  To: Maude Tang, APRN

## 2024-12-17 NOTE — TELEPHONE ENCOUNTER
Spoke with patient. Verified .   Made aware of results.  Verbalized understanding.  Patient reports he is scheduled to begin PT.   Patient is aware to contact us if he is not getting relief from PT in around 6 weeks.   Patient reports he is scheduled to see Dr. South in February so if he is not well he can always talk with him then

## 2024-12-17 NOTE — ANESTHESIA PREPROCEDURE EVALUATION
Anesthesia Evaluation     Patient summary reviewed and Nursing notes reviewed   NPO Solid Status: > 8 hours  NPO Liquid Status: > 2 hours           Airway   Mallampati: I  TM distance: >3 FB  Neck ROM: full  No difficulty expected  Dental - normal exam     Pulmonary - negative pulmonary ROS and normal exam   Cardiovascular - normal exam  Exercise tolerance: good (4-7 METS)    Patient on routine beta blocker    (+) hypertension, valvular problems/murmurs AS, past MI , CAD, CABG >6 Months, hyperlipidemia      Neuro/Psych- negative ROS  GI/Hepatic/Renal/Endo    (+) diabetes mellitus well controlled, thyroid problem hypothyroidism    Musculoskeletal (-) negative ROS    Abdominal  - normal exam    Bowel sounds: normal.   Substance History - negative use     OB/GYN negative ob/gyn ROS         Other      history of cancer    ROS/Med Hx Other: ECHO 1/24: ·  Left ventricular systolic function is normal. Left ventricular ejection fraction appears to be 56 - 60%.  ·  Left ventricular wall thickness is consistent with mild concentric hypertrophy.  ·  The left atrial cavity is moderate to severely dilated.  ·  Saline test results are negative.  ·  Severe aortic valve stenosis is present.    AV/ MV replacement on 2/16/24    Cardiac clearance 11/5/24                Anesthesia Plan    ASA 4     general   total IV anesthesia  (Total IV Anesthesia    Patient understands anesthesia not responsible for dental damage.  )  intravenous induction     Anesthetic plan, risks, benefits, and alternatives have been provided, discussed and informed consent has been obtained with: patient.    Plan discussed with CRNA.    CODE STATUS:

## 2024-12-18 ENCOUNTER — ANESTHESIA (OUTPATIENT)
Dept: GASTROENTEROLOGY | Facility: HOSPITAL | Age: 64
End: 2024-12-18
Payer: COMMERCIAL

## 2024-12-18 ENCOUNTER — HOSPITAL ENCOUNTER (OUTPATIENT)
Facility: HOSPITAL | Age: 64
Setting detail: HOSPITAL OUTPATIENT SURGERY
Discharge: HOME | End: 2024-12-18
Attending: INTERNAL MEDICINE | Admitting: INTERNAL MEDICINE
Payer: COMMERCIAL

## 2024-12-18 VITALS
RESPIRATION RATE: 18 BRPM | OXYGEN SATURATION: 100 % | HEART RATE: 70 BPM | DIASTOLIC BLOOD PRESSURE: 72 MMHG | TEMPERATURE: 97.5 F | BODY MASS INDEX: 26.59 KG/M2 | WEIGHT: 169.75 LBS | SYSTOLIC BLOOD PRESSURE: 121 MMHG

## 2024-12-18 LAB — GLUCOSE BLDC GLUCOMTR-MCNC: 81 MG/DL (ref 70–99)

## 2024-12-18 PROCEDURE — 82948 REAGENT STRIP/BLOOD GLUCOSE: CPT

## 2024-12-18 PROCEDURE — 25010000002 PROPOFOL 10 MG/ML EMULSION: Performed by: NURSE ANESTHETIST, CERTIFIED REGISTERED

## 2024-12-18 PROCEDURE — 25010000002 LIDOCAINE PF 2% 2 % SOLUTION: Performed by: NURSE ANESTHETIST, CERTIFIED REGISTERED

## 2024-12-18 RX ORDER — LIDOCAINE HYDROCHLORIDE 20 MG/ML
INJECTION, SOLUTION EPIDURAL; INFILTRATION; INTRACAUDAL; PERINEURAL AS NEEDED
Status: DISCONTINUED | OUTPATIENT
Start: 2024-12-18 | End: 2024-12-18 | Stop reason: SURG

## 2024-12-18 RX ORDER — PROPOFOL 10 MG/ML
VIAL (ML) INTRAVENOUS AS NEEDED
Status: DISCONTINUED | OUTPATIENT
Start: 2024-12-18 | End: 2024-12-18 | Stop reason: SURG

## 2024-12-18 RX ADMIN — PROPOFOL 150 MCG/KG/MIN: 10 INJECTION, EMULSION INTRAVENOUS at 10:18

## 2024-12-18 RX ADMIN — LIDOCAINE HYDROCHLORIDE 100 MG: 20 INJECTION, SOLUTION EPIDURAL; INFILTRATION; INTRACAUDAL; PERINEURAL at 10:18

## 2024-12-18 RX ADMIN — PROPOFOL 50 MG: 10 INJECTION, EMULSION INTRAVENOUS at 10:18

## 2024-12-18 NOTE — ANESTHESIA POSTPROCEDURE EVALUATION
Patient: Juan Miguel Chairez    Procedure Summary       Date: 12/18/24 Room / Location: [facility] ENDOSCOPY 2 / [facility] ENDOSCOPY    Anesthesia Start: 1015 Anesthesia Stop: 1034    Procedure: COLONOSCOPY Diagnosis:       Colon cancer screening      History of colon polyps      (Colon cancer screening [Z12.11])      (History of colon polyps [Z86.0100])    Surgeons: Radhames Garcia MD Provider: Yoel Ventura CRNA    Anesthesia Type: general ASA Status: 4            Anesthesia Type: general    Vitals  Vitals Value Taken Time   /72 12/18/24 1048   Temp 36.4 °C (97.5 °F) 12/18/24 1048   Pulse 70 12/18/24 1048   Resp 18 12/18/24 1048   SpO2 100 % 12/18/24 1048           Post Anesthesia Care and Evaluation    Post-procedure mental status: acceptable.  Pain management: satisfactory to patient    Airway patency: patent  Anesthetic complications: No anesthetic complications    Cardiovascular status: acceptable  Respiratory status: acceptable    Comments: Per chart review

## 2024-12-18 NOTE — H&P
ScreeningPre Procedure History & Physical    Chief Complaint:   Screening     Subjective     HPI:   Screening     Past Medical History:   Past Medical History:   Diagnosis Date    Benign essential HTN     CAD (coronary artery disease) of artery bypass graft     Cardiomyopathy     Colon polyp     Last colonoscopy    Coronary artery disease     DM2 (diabetes mellitus, type 2)     Heart murmur 2010    Hodgkin's lymphoma 1984    Malignant    Hyperlipidemia, mixed     Hypothyroidism     Type 2 diabetes mellitus 10/15/2020       Past Surgical History:  Past Surgical History:   Procedure Laterality Date    ABDOMINAL AORTIC ANEURYSM REPAIR  2011    APPENDECTOMY      BARIATRIC SURGERY  2005    Lap band    CARDIAC CATHETERIZATION      CARDIAC SURGERY      CARDIAC VALVE REPLACEMENT      COL[facility]COPY  2013    [facility]    CORONARY ARTERY BYPASS GRAFT  2010    MITRAL VALVE REPLACEMENT  2024    SPLENECTOMY  1983       Family History:  Family History   Problem Relation Age of Onset    Stroke Father     Hypertension Mother     Stroke Mother     Colon cancer Neg Hx        Social History:   reports that he quit smoking about 13 years ago. His smoking use included cigarettes. He has a 7.5 pack-year smoking history. He has been exposed to tobacco smoke. He has quit using smokeless tobacco. He reports current alcohol use. He reports that he does not use drugs.    Medications:   Medications Prior to Admission   Medication Sig Dispense Refill Last Dose/Taking    Aspirin Low Dose 81 MG EC tablet TAKE ONE TABLET BY MOUTH DAILY 90 tablet 3     atorvastatin (LIPITOR) 40 MG tablet Take 1 tablet by mouth Daily.       Diclofenac Sodium (VOLTAREN) 1 % gel gel Apply 4 g topically to the appropriate area as directed 4 (Four) Times a Day As Needed (pain). 350 g 0     levothyroxine (SYNTHROID, LEVOTHROID) 150 MCG tablet Take 1 tablet by mouth Daily. 90 tablet 3     losartan (COZAAR) 100 MG tablet Take 1 tablet by mouth Daily.       metoprolol tartrate  (LOPRESSOR) 50 MG tablet Take 1 tablet by mouth Every 12 (Twelve) Hours. 180 tablet 1     simvastatin (ZOCOR) 40 MG tablet Take 1 tablet by mouth Daily. 90 tablet 3     sodium-potassium-magnesium sulfates (Suprep Bowel Prep Kit) 17.5-3.13-1.6 GM/177ML solution oral solution Take 1 bottle by mouth Every 12 (Twelve) Hours. (Patient not taking: Reported on 11/21/2024) 354 mL 0        Allergies:  Patient has no known allergies.        Objective     Weight 77 kg (169 lb 12.1 oz).    Physical Exam   Constitutional: Pt is oriented to person, place, and time and well-developed, well-nourished, and in no distress.   Mouth/Throat: Oropharynx is clear and moist.   Neck: Normal range of motion.   Cardiovascular: Normal rate, regular rhythm and normal heart sounds.    Pulmonary/Chest: Effort normal and breath sounds normal.   Abdominal: Soft. Nontender  Skin: Skin is warm and dry.   Psychiatric: Mood, memory, affect and judgment normal.     Assessment & Plan     Diagnosis:  Screening colonoscopy  H/o colon polyps     Anticipated Surgical Procedure:  colonoscopy    The risks, benefits, and alternatives of this procedure have been discussed with the patient or the responsible party- the patient understands and agrees to proceed.

## 2024-12-19 ENCOUNTER — TREATMENT (OUTPATIENT)
Dept: PHYSICAL THERAPY | Facility: CLINIC | Age: 64
End: 2024-12-19
Payer: COMMERCIAL

## 2024-12-19 DIAGNOSIS — M54.2 PAIN, NECK: Primary | ICD-10-CM

## 2024-12-19 DIAGNOSIS — M62.838 CERVICAL PARASPINAL MUSCLE SPASM: ICD-10-CM

## 2024-12-19 DIAGNOSIS — M54.2 PAINFUL CERVICAL ROM: ICD-10-CM

## 2024-12-19 DIAGNOSIS — M53.2X2 CERVICAL SPINE INSTABILITY: ICD-10-CM

## 2024-12-19 NOTE — PROGRESS NOTES
Physical Therapy Initial Evaluation and Plan of Care     [address] [place] [id number]    Patient: Juan Miguel Chairez   : 1960  Diagnosis/ICD-10 Code:  Pain, neck [M54.2]  Referring practitioner: ANNIE Bhat  Date of Initial Visit: 2024  Today's Date: 2024  Patient seen for 1 sessions           Subjective Questionnaire: NDI:30%       Subjective  : Pt presents to physical therapy neck pain worsening since having a valve replacement surgery earlier this year. Had radiation treatments over 40 years ago for Hodgkin's disease targeting the thyroid. Also had damage to his neck and heart problems. He feels like his neck is smaller as the collar of his shirt. Has tried Diclofenac, but concerned with the side effects. Worse with in the mornings and then later when he is tired. MRI showing degenerative disease at C6-C7.    Pt occupation/Living environment: [employer].    Patient Goals: Improve his neck pain and posture.    Current Pain 2/10  Best Pain: 2/10  Worst Pain: 6/10  Quality of pain: aching        Past Medical Hx: cancer - radiation to the thyroid, heart valve replacement     Past Medical History:   Diagnosis Date    Benign essential HTN     CAD (coronary artery disease) of artery bypass graft     Cardiomyopathy     Colon polyp     Last colonoscopy    Coronary artery disease     DM2 (diabetes mellitus, type 2)     Heart murmur     Hodgkin's lymphoma 1984    Malignant    Hyperlipidemia, mixed     Hypothyroidism     Type 2 diabetes mellitus 10/15/2020      Past Surgical History:   Procedure Laterality Date    ABDOMINAL AORTIC ANEURYSM REPAIR      APPENDECTOMY      BARIATRIC SURGERY      Lap band    CARDIAC CATHETERIZATION      CARDIAC SURGERY      CARDIAC VALVE REPLACEMENT      COLONOSCOPY      Jose    COLONOSCOPY N/A 2024    Procedure: COLONOSCOPY;  Surgeon: Radhames Garcia MD;  Location: [facility] ENDOSCOPY;  Service: Gastroenterology;   Laterality: N/A;  MELANOSIS, DIVERTICULOSIS    CORONARY ARTERY BYPASS GRAFT  2010    MITRAL VALVE REPLACEMENT  2024    SPLENECTOMY  1983             Objective          Static Posture     Head  Forward, rotated right and tilted left.    Shoulders  Rounded.    Thoracic Spine  Hyperkyphosis.    Postural Observations    Additional Postural Observation Details  Noted cervical muscle atrophy anterior and posterior just lateral to midline     Palpation   Left   Muscle spasm in the cervical paraspinals and sternocleidomastoid.   Tenderness of the cervical paraspinals and suboccipitals.     Right   Muscle spasm in the cervical paraspinals and sternocleidomastoid. Tenderness of the cervical paraspinals and suboccipitals.     Additional Palpation Details  Hypomobile CPA's cervical spine.    Neurological Testing     Sensation   Cervical/Thoracic   Left   Intact: light touch    Right   Intact: light touch    Active Range of Motion   Cervical/Thoracic Spine   Cervical    Flexion: 34 degrees   Extension: 45 degrees   Left lateral flexion: 25 degrees   Right lateral flexion: 20 degrees   Left rotation: 54 degrees   Right rotation: 62 degrees     Strength/Myotome Testing   Cervical Spine   Neck extension: 4-  Neck flexion: 4-    Left   Neck lateral flexion (C3): 4-    Right   Neck lateral flexion (C3): 4-    Left Shoulder     Planes of Motion   Flexion: 4+   Extension: 4     Isolated Muscles   Middle trapezius: 4-     Right Shoulder     Planes of Motion   Flexion: 4+   Extension: 4     Isolated Muscles   Middle trapezius: 4-       See Exercise, Manual, and Modality Logs for complete treatment.     Assessment & Plan       Assessment  Impairments: abnormal muscle firing, abnormal or restricted ROM, activity intolerance, impaired physical strength, lacks appropriate home exercise program and pain with function   Functional limitations: carrying objects, lifting, sleeping, uncomfortable because of pain, sitting, standing, reaching  overhead and unable to perform repetitive tasks   Assessment details: The patient presents to physical therapy with complaints of neck pain with muscle weakness and atrophy leading to altered posturing. He did have radiation treatments years ago likely contributing to muscle atrophy. The patient presents with associated upper extremity weakness, postural deficits, cervical stiffness, and functional deficits (NDI). The patient would benefit from skilled PT intervention to address the above mentioned functional limitations.     Prognosis: good    Goals  Plan Goals: CERVICAL PROBLEMS    1. The patient has limited ROM.    LTG 1: 12 weeks:  The patient will demonstrate 70° of B cervical spine rotation in order to adequately monitor blind spots while driving and improve ability to perform activities of daily living.    STATUS:  New  STG 1a: 6 weeks:  The patient will demonstrate 65° of B cervical spine rotation in order to adequately monitor blind spots while driving and improve ability to perform activities of daily living.       STATUS:  New        2. The patient has complaints of pain.   LTG 2: 12 weeks:  The patient will report 2/10 pain in order to more easily tolerate activities of daily living and improve sleep quality.    STATUS:  New   STG 2a: 6 weeks:  The patient will report 4/10 pain.    STATUS:  New          3. Carrying, Moving, and Handling Objects Functional Limitation     LTG 3: 12 weeks:  The patient will demonstrate 10% limitation by achieving a score of 5 on the Neck Disability Index.    STATUS:  New   STG 3a: 6 weeks:  The patient will demonstrate 14% limitation by achieving a score of 7 on the Neck Disability Index.      STATUS:  New        Plan  Therapy options: will be seen for skilled therapy services  Planned modality interventions: TENS, cryotherapy, thermotherapy (hydrocollator packs), traction and dry needling  Planned therapy interventions: manual therapy, stretching, strengthening,  neuromuscular re-education, home exercise program, joint mobilization, functional ROM exercises, soft tissue mobilization and flexibility  Frequency: 3x week  Duration in weeks: 12  Treatment plan discussed with: patient        Visit Diagnoses:    ICD-10-CM ICD-9-CM   1. Pain, neck  M54.2 723.1   2. Cervical paraspinal muscle spasm  M62.838 728.85   3. Cervical spine instability  M53.2X2 723.9       History # of Personal Factors and/or Comorbidities: HIGH (3+)  Examination of Body System(s): # of elements: MODERATE (3)  Clinical Presentation: EVOLVING  Clinical Decision Making: MODERATE      Timed:         Manual Therapy:    12     mins  59415;     Therapeutic Exercise:    10     mins  83960;     Neuromuscular Brittany:    0    mins  18457;    Therapeutic Activity:     0     mins  31008;     Gait Trainin     mins  68246;     Ultrasound:     0     mins  64754;    Ionto                               0    mins   42027  Self Care                       10     mins   27685        Un-Timed:  Electrical Stimulation:    0     mins  69235 (MC );  Dry Needling     0     mins self-pay  Canalith Repos    0     mins 83331  Traction     0     mins 01873      Timed Treatment:   32   mins   Total Treatment:     58   mins    PT SIGNATURE: Osman Vazquez PT     Electronically signed 2024    [place] License: PT - [id number]     Initial Certification  Certification Period: 2024 thru 3/18/2025  I certify that the therapy services are furnished while this patient is under my care.  The services outlined above are required by this patient, and will be reviewed every 90 days.     PHYSICIAN: Maude Tang APRN   NPI: [id number]                                        DATE:     Please sign and return via fax to [phone]. Thank you, [facility].

## 2024-12-30 ENCOUNTER — TREATMENT (OUTPATIENT)
Dept: PHYSICAL THERAPY | Facility: CLINIC | Age: 64
End: 2024-12-30
Payer: COMMERCIAL

## 2024-12-30 DIAGNOSIS — M54.2 PAIN, NECK: Primary | ICD-10-CM

## 2024-12-30 DIAGNOSIS — M53.2X2 CERVICAL SPINE INSTABILITY: ICD-10-CM

## 2024-12-30 DIAGNOSIS — M62.838 CERVICAL PARASPINAL MUSCLE SPASM: ICD-10-CM

## 2024-12-30 DIAGNOSIS — M54.2 PAINFUL CERVICAL ROM: ICD-10-CM

## 2024-12-30 PROCEDURE — 97110 THERAPEUTIC EXERCISES: CPT | Performed by: PHYSICAL THERAPIST

## 2024-12-30 PROCEDURE — 97140 MANUAL THERAPY 1/> REGIONS: CPT | Performed by: PHYSICAL THERAPIST

## 2024-12-30 NOTE — PROGRESS NOTES
Physical Therapy Daily Treatment Note  [facility] [address] [place] [address]    Patient: Juan Miguel Chairez   : 1960  Referring practitioner: MICHAELA Bhat*  Date of Initial Visit: Type: THERAPY  Noted: 2024  Today's Date: 2024  Patient seen for 2 sessions           Subjective  Juan Miguel Chairez reports: history of current deficits to PTA, answering multiple questions as this is our first intervention together.      Objective   See Exercise, Manual, and Modality Logs for complete treatment.     Assessment/Plan  Harjinder noted improved cervical AROM following session. Reviewed cervical retraction, performing this during session. Will utilize mirror for postural awareness next treatment. Harjinder is just beginning care to attend to deficits outlined in IE.     Visit Diagnoses:    ICD-10-CM ICD-9-CM   1. Pain, neck  M54.2 723.1   2. Cervical paraspinal muscle spasm  M62.838 728.85   3. Cervical spine instability  M53.2X2 723.9   4. Painful cervical ROM  M54.2 723.1       Progress per Plan of Care and Progress strengthening /stabilization /functional activity         Timed:  Manual Therapy:    30     mins  72275;  Therapeutic Exercise:    8     mins  50404;     Neuromuscular Brittany:        mins  74445;    Therapeutic Activity:          mins  66118;     Gait Training:           mins  04922;     Ultrasound:          mins  28854;    Electrical Stimulation:         mins  48085 ( );  Aquatics  __   mins   95391    Untimed:  Electrical Stimulation:         mins  68835 ( );  Mechanical Traction:         mins  43065;     Timed Treatment:   38   mins   Total Treatment:     38   mins    Electronically Signed:  Rosanna Yarbrough PTA  Physical Therapist Assistant    [place] [facility] license [id number]

## 2025-01-06 RX ORDER — ASPIRIN 81 MG/1
81 TABLET, COATED ORAL DAILY
Qty: 90 TABLET | Refills: 3 | Status: SHIPPED | OUTPATIENT
Start: 2025-01-06

## 2025-01-08 ENCOUNTER — TREATMENT (OUTPATIENT)
Dept: PHYSICAL THERAPY | Facility: CLINIC | Age: 65
End: 2025-01-08
Payer: COMMERCIAL

## 2025-01-08 DIAGNOSIS — M62.838 CERVICAL PARASPINAL MUSCLE SPASM: Primary | ICD-10-CM

## 2025-01-08 DIAGNOSIS — M53.2X2 CERVICAL SPINE INSTABILITY: ICD-10-CM

## 2025-01-08 DIAGNOSIS — M54.2 PAINFUL CERVICAL ROM: ICD-10-CM

## 2025-01-08 DIAGNOSIS — M54.2 PAIN, NECK: ICD-10-CM

## 2025-01-08 PROCEDURE — 97530 THERAPEUTIC ACTIVITIES: CPT | Performed by: PHYSICAL THERAPIST

## 2025-01-08 PROCEDURE — 97112 NEUROMUSCULAR REEDUCATION: CPT | Performed by: PHYSICAL THERAPIST

## 2025-01-08 PROCEDURE — 97110 THERAPEUTIC EXERCISES: CPT | Performed by: PHYSICAL THERAPIST

## 2025-01-08 NOTE — PROGRESS NOTES
[facility]                              Physical Therapy Daily Treatment Note    Patient: Juan Miguel Chairez   : 1960  Diagnosis/ICD-10 Code:  Cervical paraspinal muscle spasm [M62.838]  Referring practitioner: MICHAELA Bhat*  Date of Initial Visit: Type: THERAPY  Noted: 2024  Today's Date: 2025  Patient seen for 3 sessions           Subjective   Juan Miguel Chairez reports: he feels like he is able to turn his head side to side better since starting therapy and doesn't have to move his body when turing his head. Pt denies any neck pain at time of arrival.      Objective     See Exercise, Manual, and Modality Logs for complete treatment.     Assessment/Plan  Juan Miguel tolerated today's session well with continued strengthening of cervical and postural muscles.  Patient will continue to benefit from skilled physical therapy services to further address pain management,  their deficits in strength, and range of motion in order to improve upon their functional mobility for any ADL concerns.        Progress per Plan of Care         Timed:  Manual Therapy:         mins  77257;  Therapeutic Exercise:    10     mins  77921;     Neuromuscular Brittany:    8    mins  28673;    Therapeutic Activity:     8     mins  49092;     Gait Training:           mins  42777;        Untimed:  Electrical Stimulation:         mins  63780 ( );  Mechanical Traction:         mins  66474;       Timed Treatment:   26   mins   Total Treatment:     26   mins      Electronically signed:     Annamarie Sanches PTA  Physical Therapist Assistant  [facility] License #: [id number]

## 2025-01-09 ENCOUNTER — TREATMENT (OUTPATIENT)
Dept: PHYSICAL THERAPY | Facility: CLINIC | Age: 65
End: 2025-01-09
Payer: COMMERCIAL

## 2025-01-09 DIAGNOSIS — M53.2X2 CERVICAL SPINE INSTABILITY: ICD-10-CM

## 2025-01-09 DIAGNOSIS — M54.2 PAINFUL CERVICAL ROM: ICD-10-CM

## 2025-01-09 DIAGNOSIS — M54.2 PAIN, NECK: ICD-10-CM

## 2025-01-09 DIAGNOSIS — M62.838 CERVICAL PARASPINAL MUSCLE SPASM: Primary | ICD-10-CM

## 2025-01-09 NOTE — PROGRESS NOTES
Physical Therapy Daily Treatment Note  [address] [place] [id number]    Patient: Juan Miguel Chairez   : 1960  Diagnosis/ICD-10 Code:  Cervical paraspinal muscle spasm [M62.838]  Referring practitioner: MICHAELA Bhat*  Date of Initial Visit: Type: THERAPY  Noted: 2024  Today's Date: 2025  Patient seen for 4 sessions           Subjective : Patient reports he feels like he has improved cervical ROM since starting therapy.    Objective   See Exercise, Manual, and Modality Logs for complete treatment.       Assessment/Plan : Pt tolerated today's session well. Continued with interventions at the cervical spine, noted improved cervical rotation post treatment, however, still limited with left cervical rotation. Pt would benefit from continued skilled therapy to address deficits. Progress per plan of care.             Timed:  Manual Therapy:    24     mins  91782;  Therapeutic Exercise:    8     mins  59272;     Neuromuscular Brittany:    0    mins  99584;    Therapeutic Activity:     0     mins  11735;     Gait Trainin     mins  82934;     Ultrasound:     0     mins  18633;    Aquatic Therapy    0     mins  81520;        Timed Treatment:   32   mins   Total Treatment:     32   mins    Osman Vazquez PT  Physical Therapist    Electronically signed 2025    [place] License: PT - [id number]

## 2025-01-13 ENCOUNTER — TREATMENT (OUTPATIENT)
Dept: PHYSICAL THERAPY | Facility: CLINIC | Age: 65
End: 2025-01-13
Payer: COMMERCIAL

## 2025-01-13 DIAGNOSIS — M62.838 CERVICAL PARASPINAL MUSCLE SPASM: Primary | ICD-10-CM

## 2025-01-13 DIAGNOSIS — M54.2 PAIN, NECK: ICD-10-CM

## 2025-01-13 DIAGNOSIS — M54.2 PAINFUL CERVICAL ROM: ICD-10-CM

## 2025-01-13 DIAGNOSIS — M53.2X2 CERVICAL SPINE INSTABILITY: ICD-10-CM

## 2025-01-13 PROCEDURE — 97140 MANUAL THERAPY 1/> REGIONS: CPT | Performed by: PHYSICAL THERAPIST

## 2025-01-13 PROCEDURE — 97110 THERAPEUTIC EXERCISES: CPT | Performed by: PHYSICAL THERAPIST

## 2025-01-13 PROCEDURE — 97530 THERAPEUTIC ACTIVITIES: CPT | Performed by: PHYSICAL THERAPIST

## 2025-01-13 NOTE — PROGRESS NOTES
Physical Therapy Daily Treatment Note  [address] [place] [address]    Patient: Juan Miguel Chairez   : 1960  Diagnosis/ICD-10 Code:  Cervical paraspinal muscle spasm [M62.838]  Referring practitioner: MICHAELA Bhat*  Date of Initial Visit: Type: THERAPY  Noted: 2024  Today's Date: 2025  Patient seen for 5 sessions           Subjective : Patient reports he is doing well, still feels like he can move better with turning his head.    Objective   See Exercise, Manual, and Modality Logs for complete treatment.       Assessment/Plan : Pt tolerated today's session well. Progressed posture endurance routine and mobility combined with strengthening for the cervical spine. Pt would benefit from continued skilled therapy to address deficits. Progress per plan of care.             Timed:  Manual Therapy:    14     mins  65469;  Therapeutic Exercise:    12     mins  40325;     Neuromuscular Brittany:    0    mins  09289;    Therapeutic Activity:     8     mins  87928;     Gait Trainin     mins  02914;     Ultrasound:     0     mins  20761;    Aquatic Therapy    0     mins  43099;    Untimed:  Electrical Stimulation:    0     mins  16163 ( );  Dry Needling     0     mins self-pay;  Mechanical Traction    0     mins  78314  Moist Heat     0     mins  No charge  Canalith Repos              0     mins 30056    Timed Treatment:   34   mins   Total Treatment:     34   mins    Osman Vazquez PT  Physical Therapist    Electronically signed 2025    [place] License: PT - [id number]

## 2025-01-16 ENCOUNTER — TREATMENT (OUTPATIENT)
Dept: PHYSICAL THERAPY | Facility: CLINIC | Age: 65
End: 2025-01-16
Payer: COMMERCIAL

## 2025-01-16 DIAGNOSIS — M54.2 PAINFUL CERVICAL ROM: ICD-10-CM

## 2025-01-16 DIAGNOSIS — M53.2X2 CERVICAL SPINE INSTABILITY: ICD-10-CM

## 2025-01-16 DIAGNOSIS — M62.838 CERVICAL PARASPINAL MUSCLE SPASM: Primary | ICD-10-CM

## 2025-01-16 DIAGNOSIS — M54.2 PAIN, NECK: ICD-10-CM

## 2025-01-16 NOTE — PROGRESS NOTES
Physical Therapy Daily Treatment Note  [address] [place] [address]    Patient: Juan Miguel Chairez   : 1960  Diagnosis/ICD-10 Code:  Cervical paraspinal muscle spasm [M62.838]  Referring practitioner: MICHAELA Bhat*  Date of Initial Visit: Type: THERAPY  Noted: 2024  Today's Date: 2025  Patient seen for 6 sessions           Subjective : Patient reports he is doing well today, feels like his posture is overall improved.    Objective   See Exercise, Manual, and Modality Logs for complete treatment.       Assessment/Plan : Pt tolerated today's session well. Continued to work on progression of posture exercises and endurance training in addition to mobility work with manual therapy. Pt would benefit from continued skilled therapy to address deficits. Progress per plan of care.             Timed:  Manual Therapy:    14     mins  09446;  Therapeutic Exercise:    10     mins  34726;     Neuromuscular Brittany:    0    mins  11814;    Therapeutic Activity:     8     mins  69457;     Gait Trainin     mins  14222;     Ultrasound:     0     mins  50219;    Aquatic Therapy    0     mins  69536;        Timed Treatment:   32   mins   Total Treatment:     32   mins    Osman Vazquez PT  Physical Therapist    Electronically signed 2025    [place] License: PT - [id number]

## 2025-01-20 ENCOUNTER — TREATMENT (OUTPATIENT)
Dept: PHYSICAL THERAPY | Facility: CLINIC | Age: 65
End: 2025-01-20
Payer: COMMERCIAL

## 2025-01-20 DIAGNOSIS — M53.2X2 CERVICAL SPINE INSTABILITY: ICD-10-CM

## 2025-01-20 DIAGNOSIS — M54.2 PAINFUL CERVICAL ROM: ICD-10-CM

## 2025-01-20 DIAGNOSIS — M62.838 CERVICAL PARASPINAL MUSCLE SPASM: Primary | ICD-10-CM

## 2025-01-20 DIAGNOSIS — M54.2 PAIN, NECK: ICD-10-CM

## 2025-01-20 PROCEDURE — 97530 THERAPEUTIC ACTIVITIES: CPT | Performed by: PHYSICAL THERAPIST

## 2025-01-20 PROCEDURE — 97110 THERAPEUTIC EXERCISES: CPT | Performed by: PHYSICAL THERAPIST

## 2025-01-20 NOTE — PROGRESS NOTES
Progress Assessment  [address] [place] [address]        Patient: Juan Miguel Chairez   : 1960  Diagnosis/ICD-10 Code:  Cervical paraspinal muscle spasm [M62.838]  Referring practitioner: MICHAELA Bhat*  Date of Initial Visit: Type: THERAPY  Noted: 2024  Today's Date: 2025  Patient seen for 7 sessions      Subjective:     Subjective Questionnaire: NDI:3 = 6%  Clinical Progress: improved  Home Program Compliance: Yes  Treatment has included: therapeutic exercise, neuromuscular re-education, manual therapy, and therapeutic activity    Subjective : Juan Miguel Chairez reports: Pt is feeling better since starting therapy. He includes feeling like his posture is better and thus his neck isn't hurting as bad. Whenever he does have pain its in the morning and when he is really tired.    Current Pain 0/10      Objective       Postural Observations        Static Posture      Head  Forward, rotated right and tilted left.     Shoulders  Rounded.     Thoracic Spine  Hyperkyphosis.    Additional Postural Observation Details  Noted cervical muscle atrophy anterior and posterior just lateral to midline      Palpation   Left   Muscle spasm in the cervical paraspinals and sternocleidomastoid.   Tenderness of the cervical paraspinals and suboccipitals.      Right   Muscle spasm in the cervical paraspinals and sternocleidomastoid. Tenderness of the cervical paraspinals and suboccipitals.      Additional Palpation Details  Hypomobile CPA's cervical spine.          Active Range of Motion   Cervical/Thoracic Spine   Cervical     Flexion: 34 degrees   Extension: 45 degrees   Left lateral flexion: 25 degrees   Right lateral flexion: 20 degrees   Left rotation: 56 degrees   Right rotation: 68 degrees      Strength/Myotome Testing   Cervical Spine   Neck extension: 4  Neck flexion: 4     Left   Neck lateral flexion (C3): 4     Right   Neck lateral flexion (C3): 4     Left Shoulder      Planes of Motion   Flexion:  4+   Extension: 4      Isolated Muscles   Middle trapezius: 4-      Right Shoulder      Planes of Motion   Flexion: 4+   Extension: 4      Isolated Muscles   Middle trapezius: 4-       Assessment/Plan      Assessment details: The patient presents to physical therapy with complaints of neck pain with muscle weakness and atrophy leading to altered posturing. He did have radiation treatments years ago likely contributing to muscle atrophy. He has met 3 of his goals set at the evaluation. He is subjectively having less pain, measures show improvement in ROM, although slim improvement. The patient presents with associated upper extremity weakness, postural deficits, cervical stiffness, and functional deficits (NDI). The patient would benefit from ongoing skilled PT intervention to address the above mentioned functional limitations.     Prognosis: good     Goals  Plan Goals: CERVICAL PROBLEMS     1. The patient has limited ROM.                       LTG 1: 12 weeks:  The patient will demonstrate 70° of B cervical spine rotation in order to adequately monitor blind spots while driving and improve ability to perform activities of daily living.                          STATUS:  Not met  STG 1a: 6 weeks:  The patient will demonstrate 65° of B cervical spine rotation in order to adequately monitor blind spots while driving and improve ability to perform activities of daily living.                           STATUS:  Not met                              2. The patient has complaints of pain.              LTG 2: 12 weeks:  The patient will report 2/10 pain in order to more easily tolerate activities of daily living and improve sleep quality.                          STATUS:  MET              STG 2a: 6 weeks:  The patient will report 4/10 pain.                          STATUS:  MET                                 3. Carrying, Moving, and Handling Objects Functional Limitation                               LTG 3: 12 weeks:  The  patient will demonstrate 10% limitation by achieving a score of 5 on the Neck Disability Index.                          STATUS:  Not met              STG 3a: 6 weeks:  The patient will demonstrate 14% limitation by achieving a score of 7 on the Neck Disability Index.                            STATUS:  MET      Progress toward previous goals: Partially Met    See Exercise, Manual, and Modality Logs for complete treatment.         Recommendations: Continue as planned  Timeframe: 2 months  Prognosis to achieve goals: good    PT Signature: Osman Vazquez, PT    Electronically signed 2025    [place] License: PT - [id number]     Based upon review of the patient's progress and continued therapy plan, it is my medical opinion that Juan Miguel Chairez should continue physical therapy treatment at [facility] PH[facility]  [address]   [place] [address] [phone].      Timed:         Manual Therapy:    0     mins  55286;     Therapeutic Exercise:    24     mins  54023;     Neuromuscular Brittany:    0    mins  87994;    Therapeutic Activity:     8     mins  56635;     Gait Trainin     mins  30268;     Ultrasound:     0     mins  31044;    Self Care                       0     mins   51640  Aquatic                          0     mins 42109      Un-Timed:  Electrical Stimulation:    0     mins  94816 ( );  Dry Needling     0     mins self-pay  Canalith Repos    0     mins 11596  Traction     0     mins 30589        Timed Treatment:   32   mins   Total Treatment:     32   mins      I certify that the therapy services are furnished while this patient is under my care.  The services outlined above are required by this patient, and will be reviewed every 90 days.

## 2025-01-22 DIAGNOSIS — I10 ESSENTIAL HYPERTENSION: ICD-10-CM

## 2025-01-22 RX ORDER — METOPROLOL TARTRATE 50 MG
50 TABLET ORAL EVERY 12 HOURS
Qty: 180 TABLET | Refills: 1 | Status: SHIPPED | OUTPATIENT
Start: 2025-01-22

## 2025-01-23 ENCOUNTER — TREATMENT (OUTPATIENT)
Dept: PHYSICAL THERAPY | Facility: CLINIC | Age: 65
End: 2025-01-23
Payer: COMMERCIAL

## 2025-01-23 DIAGNOSIS — M54.2 PAINFUL CERVICAL ROM: ICD-10-CM

## 2025-01-23 DIAGNOSIS — M62.838 CERVICAL PARASPINAL MUSCLE SPASM: Primary | ICD-10-CM

## 2025-01-23 DIAGNOSIS — M54.2 PAIN, NECK: ICD-10-CM

## 2025-01-23 DIAGNOSIS — M53.2X2 CERVICAL SPINE INSTABILITY: ICD-10-CM

## 2025-01-23 NOTE — PROGRESS NOTES
"Physical Therapy Daily Treatment Note  [place] [address] [place] [address]    Patient: Juan Miguel Chairez   : 1960  Referring practitioner: MICHAELA Bhat*  Date of Initial Visit: Type: THERAPY  Noted: 2024  Today's Date: 2025  Patient seen for 8 sessions           Subjective  Juan Miguel Chairez reports: he believes his posture has improved and he is holding his head more upright . \"People around me are saying that they do too.\"    Objective   See Exercise, Manual, and Modality Logs for complete treatment.     Assessment/Plan  Harjinder is showing gains in AROM at his cervical spine, and improved ability to hold his head upright. He is showing progressions in his gross upper quadrant strength, evident with his exercise performance. Continued need for skilled care required to attain best possible outcome.     Visit Diagnoses:    ICD-10-CM ICD-9-CM   1. Cervical paraspinal muscle spasm  M62.838 728.85   2. Pain, neck  M54.2 723.1   3. Cervical spine instability  M53.2X2 723.9   4. Painful cervical ROM  M54.2 723.1       Progress per Plan of Care and Progress strengthening /stabilization /functional activity       Timed:  Manual Therapy:    13     mins  89945;  Therapeutic Exercise:    8     mins  88969;     Neuromuscular Brittany:    10    mins  30703;    Therapeutic Activity:          mins  65433;     Gait Training:           mins  86015;     Ultrasound:          mins  03720;    Electrical Stimulation:         mins  71659 ( );  Aquatics  __   mins   87367    Untimed:  Electrical Stimulation:         mins  06697 ( );  Mechanical Traction:         mins  63635;     Timed Treatment:   31   mins   Total Treatment:     31   mins    Electronically Signed:  Rosanna Yarbrough PTA  Physical Therapist Assistant    [place] PTA license [id number]            "

## 2025-01-29 ENCOUNTER — TREATMENT (OUTPATIENT)
Dept: PHYSICAL THERAPY | Facility: CLINIC | Age: 65
End: 2025-01-29
Payer: COMMERCIAL

## 2025-01-29 DIAGNOSIS — M54.2 PAIN, NECK: ICD-10-CM

## 2025-01-29 DIAGNOSIS — M54.2 PAINFUL CERVICAL ROM: ICD-10-CM

## 2025-01-29 DIAGNOSIS — M53.2X2 CERVICAL SPINE INSTABILITY: ICD-10-CM

## 2025-01-29 DIAGNOSIS — M62.838 CERVICAL PARASPINAL MUSCLE SPASM: Primary | ICD-10-CM

## 2025-01-29 NOTE — PROGRESS NOTES
Physical Therapy Daily Treatment Note  [place] [address] [place] [address]    Patient: Juan Miguel Chairez   : 1960  Referring practitioner: MICHAELA Bhat*  Date of Initial Visit: Type: THERAPY  Noted: 2024  Today's Date: 2025  Patient seen for 9 sessions           Subjective  Juan Miguel Chairez reports: he  was happy to wake this morning without neck pain. He also commented about being out in the cold last night and that not really affecting him.    Objective   PTA took pictures of Harjinder's neck to let him see the muscle mass deficit and tightness.    See Exercise, Manual, and Modality Logs for complete treatment.     Assessment/Plan  Harjinder reported he could feel the difference the manual therapy makes in relaxing the tightened tissues and how much better he moves his head. He will attend a session tomorrow then leave for a meeting in [place] , returning for therapy at his return home.     Visit Diagnoses:    ICD-10-CM ICD-9-CM   1. Cervical paraspinal muscle spasm  M62.838 728.85   2. Pain, neck  M54.2 723.1   3. Cervical spine instability  M53.2X2 723.9   4. Painful cervical ROM  M54.2 723.1       Progress per Plan of Care and Progress strengthening /stabilization /functional activity       Timed:  Manual Therapy:    23     mins  88304;  Therapeutic Exercise:    10     mins  72687;     Neuromuscular Brittany:        mins  41147;    Therapeutic Activity:          mins  69098;     Gait Training:           mins  96374;     Ultrasound:          mins  16891;    Electrical Stimulation:         mins  66069 ( );  Aquatics  __   mins   07543    Untimed:  Electrical Stimulation:         mins  58477 ( );  Mechanical Traction:         mins  81027;     Timed Treatment:   33   mins   Total Treatment:     33   mins    Electronically Signed:  Rosanna Yarbrough PTA  Physical Therapist Assistant    [place] PTA license [id number]

## 2025-01-30 ENCOUNTER — TREATMENT (OUTPATIENT)
Dept: PHYSICAL THERAPY | Facility: CLINIC | Age: 65
End: 2025-01-30
Payer: COMMERCIAL

## 2025-01-30 DIAGNOSIS — M54.2 PAIN, NECK: ICD-10-CM

## 2025-01-30 DIAGNOSIS — M62.838 CERVICAL PARASPINAL MUSCLE SPASM: Primary | ICD-10-CM

## 2025-01-30 DIAGNOSIS — M53.2X2 CERVICAL SPINE INSTABILITY: ICD-10-CM

## 2025-01-30 DIAGNOSIS — M54.2 PAINFUL CERVICAL ROM: ICD-10-CM

## 2025-01-30 NOTE — PROGRESS NOTES
Physical Therapy Daily Treatment Note  [place] [address] [place] [address]    Patient: Juan Miguel Chairez   : 1960  Referring practitioner: MICHAELA Bhat*  Date of Initial Visit: Type: THERAPY  Noted: 2024  Today's Date: 2025  Patient seen for 10 sessions           Subjective  Juan Miguel Chairez reports:  he can tell his posture is better and he has fewer c/o since starting therapy.     Objective   See Exercise, Manual, and Modality Logs for complete treatment.     Assessment/Plan  Harjinder is to use a towel roll for self traction ongoing. He is showing gains in AROM except for R lateral flexion. Anticipate  working into additional cervical strengthening next session.     Visit Diagnoses:    ICD-10-CM ICD-9-CM   1. Cervical paraspinal muscle spasm  M62.838 728.85   2. Pain, neck  M54.2 723.1   3. Cervical spine instability  M53.2X2 723.9   4. Painful cervical ROM  M54.2 723.1       Progress per Plan of Care and Progress strengthening /stabilization /functional activity       Timed:  Manual Therapy:    23     mins  23071;  Therapeutic Exercise:    10     mins  60886;     Neuromuscular Brittany:        mins  72142;    Therapeutic Activity:          mins  59390;     Gait Training:           mins  50096;     Ultrasound:          mins  05232;    Electrical Stimulation:         mins  38748 ( );  Aquatics  __   mins   99969    Untimed:  Electrical Stimulation:         mins  61750 ( );  Mechanical Traction:         mins  83862;     Timed Treatment:   33   mins   Total Treatment:     33   mins    Electronically Signed:  Rosanna Yarbrough PTA  Physical Therapist Assistant    [place] PTA license [id number]

## 2025-02-05 NOTE — PROGRESS NOTES
"Chief Complaint  Hypertension (Follow up )    Subjective          Juan Miguel Chairez presents to [facility]    History of Present Illness  Neck pain - History of radiation treatment several years ago. Currently attending physical therapy twice weekly. [place] and pain has increased. He has not been to physical therapy since he fell, but has an appointment today. Cervical MRI with degenerative changes at C6-7. He does report history of of using NSAIDs and muscle relaxers, but does not wish to use more at this time.     Hypertension - Does not monitor blood pressure at home. Denies chest pain, shortness of breath, dizziness and palpitations. Patient takes daily aspirin. Continues daily Lopressor. Reporting dizziness with higher doses in the past.    Hyperlipidemia - continues statin.    Colonoscopy 12/2024 was reassuring. He will need to repeat in 5 years.    Current Outpatient Medications   Medication Instructions    Aspirin Low Dose 81 mg, Oral, Daily    atorvastatin (LIPITOR) 40 mg, Daily    levothyroxine (SYNTHROID, LEVOTHROID) 150 mcg, Oral, Daily    metoprolol tartrate (LOPRESSOR) 50 mg, Oral, Every 12 Hours       The following portions of the patient's history were reviewed and updated as appropriate: allergies, current medications, past family history, past medical history, past social history, past surgical history, and problem list.    Objective   Vital Signs:   /83 (BP Location: Left arm, Patient Position: Sitting, Cuff Size: Adult)   Pulse 76   Temp 98.2 °F (36.8 °C) (Temporal)   Ht 170.2 cm (67\")   Wt 74.5 kg (164 lb 4.8 oz)   SpO2 98%   BMI 25.73 kg/m²     BP Readings from Last 3 Encounters:   02/10/25 136/83   12/18/24 121/72   11/21/24 122/75     Wt Readings from Last 3 Encounters:   02/10/25 74.5 kg (164 lb 4.8 oz)   12/18/24 77 kg (169 lb 12.1 oz)   11/21/24 74.4 kg (164 lb)         Physical Exam   Appearance: No acute " distress, well-nourished  Head: normocephalic, atraumatic  Eyes: extraocular movements intact, no scleral icterus, no conjunctival injection  Ears, Nose, and Throat: external ears normal, nares patent, moist mucous membranes  Cardiovascular: regular rate and rhythm. no murmurs, rubs, or gallops. no edema  Respiratory: breathing comfortably, symmetric chest rise, clear to auscultation bilaterally. No wheezes, rales, or rhonchi.  Neuro: alert and oriented to time, place, and person. Normal gait  Psych: normal mood and affect       Result Review :   The following data was reviewed by: Alex South Jr, MD on 02/10/2025:  Common labs          3/4/2024    14:50 3/11/2024    22:59 10/23/2024    14:02   Common Labs   Glucose 79  143  90    BUN 16  17  11    Creatinine 0.83  0.91  1.07    Sodium 131  134  136    Potassium 4.9  4.8  4.9    Chloride 97  98  100    Calcium 8.6  8.8  9.5    Albumin 3.3  3.4  4.1    Total Bilirubin 0.5  0.6  0.6    Alkaline Phosphatase 152  159  119    AST (SGOT) 32  26  30    ALT (SGPT) 26  19  24    WBC 9.51  9.94  9.19    Hemoglobin 10.0  9.7  10.8    Hematocrit 31.6  31.6  37.8    Platelets 728  444  406    Total Cholesterol   123    Triglycerides   69    HDL Cholesterol   42    LDL Cholesterol    67        Lab Results   Component Value Date    SARSANTIGEN Detected (A) 07/18/2022    COVID19 NOT DETECTED 11/17/2020    FLUAAG Not Detected 07/18/2022    FLUBAG Not Detected 07/18/2022    INR 1.46 (H) 03/11/2024    BILIRUBINUR Negative 05/24/2021          Assessment and Plan    Diagnoses and all orders for this visit:    1. Annual physical exam (Primary)    2. Essential hypertension  Comments:  Blood pressure goal <130/80. Not due for labs today. Will place order for April.  Orders:  -     CBC & Differential; Future  -     Comprehensive Metabolic Panel; Future    3. Mixed hyperlipidemia  Comments:  Continue statin  Orders:  -     Lipid Panel; Future    4. Acquired  hypothyroidism  Comments:  Continue Levothyroxine. Will place order to recheck TSH in April.  Orders:  -     TSH Rfx On Abnormal To Free T4; Future    5. Neck pain  Comments:  Will continue PT    6. Hyperglycemia  -     Hemoglobin A1c; Future    7. Prostate cancer screening  -     PSA Screen; Future      Advised on diet, physical activity, etc      Medications Discontinued During This Encounter   Medication Reason    Diclofenac Sodium (VOLTAREN) 1 % gel gel *Therapy completed    simvastatin (ZOCOR) 40 MG tablet *Therapy completed    losartan (COZAAR) 100 MG tablet *Therapy completed        Follow Up   Return in about 4 months (around 6/10/2025) for DM, HTN.  Patient was given instructions and counseling regarding his condition or for health maintenance advice. Please see specific information pulled into the AVS if appropriate.     Patient has been erroneously marked as diabetic. Based on the available clinical information, he does not have diabetes and should therefore be excluded from diabetic health maintenance and quality measures for the remainder of the reporting period.        Alex South Jr, MD  02/10/25  09:43 EST

## 2025-02-10 ENCOUNTER — OFFICE VISIT (OUTPATIENT)
Dept: INTERNAL MEDICINE | Facility: CLINIC | Age: 65
End: 2025-02-10
Payer: COMMERCIAL

## 2025-02-10 ENCOUNTER — TREATMENT (OUTPATIENT)
Dept: PHYSICAL THERAPY | Facility: CLINIC | Age: 65
End: 2025-02-10
Payer: COMMERCIAL

## 2025-02-10 VITALS
OXYGEN SATURATION: 98 % | DIASTOLIC BLOOD PRESSURE: 83 MMHG | SYSTOLIC BLOOD PRESSURE: 136 MMHG | WEIGHT: 164.3 LBS | TEMPERATURE: 98.2 F | HEIGHT: 67 IN | BODY MASS INDEX: 25.79 KG/M2 | HEART RATE: 76 BPM

## 2025-02-10 DIAGNOSIS — I10 ESSENTIAL HYPERTENSION: ICD-10-CM

## 2025-02-10 DIAGNOSIS — Z00.00 ANNUAL PHYSICAL EXAM: Primary | ICD-10-CM

## 2025-02-10 DIAGNOSIS — M62.838 CERVICAL PARASPINAL MUSCLE SPASM: Primary | ICD-10-CM

## 2025-02-10 DIAGNOSIS — E03.9 ACQUIRED HYPOTHYROIDISM: ICD-10-CM

## 2025-02-10 DIAGNOSIS — Z12.5 PROSTATE CANCER SCREENING: ICD-10-CM

## 2025-02-10 DIAGNOSIS — R73.9 HYPERGLYCEMIA: ICD-10-CM

## 2025-02-10 DIAGNOSIS — E78.2 MIXED HYPERLIPIDEMIA: ICD-10-CM

## 2025-02-10 DIAGNOSIS — M54.2 PAINFUL CERVICAL ROM: ICD-10-CM

## 2025-02-10 DIAGNOSIS — M54.2 PAIN, NECK: ICD-10-CM

## 2025-02-10 DIAGNOSIS — M54.2 NECK PAIN: ICD-10-CM

## 2025-02-10 DIAGNOSIS — M53.2X2 CERVICAL SPINE INSTABILITY: ICD-10-CM

## 2025-02-10 PROCEDURE — 99396 PREV VISIT EST AGE 40-64: CPT | Performed by: INTERNAL MEDICINE

## 2025-02-10 PROCEDURE — 97140 MANUAL THERAPY 1/> REGIONS: CPT | Performed by: PHYSICAL THERAPIST

## 2025-02-10 NOTE — PROGRESS NOTES
Physical Therapy Daily Treatment Note  [place] [address] [place] [address]    Patient: Juan Miguel Chairez   : 1960  Referring practitioner: MICHAELA Bhat*  Date of Initial Visit: Type: THERAPY  Noted: 2024  Today's Date: 2/10/2025  Patient seen for 11 sessions           Subjective  Juan Miguel Chairez reports:  he slipped while boarding the plane on , landing on his back. Sheriff Chairez reported he had his back pack on and that took a lot of the force of him landing directly on his back. He noted feeling pain in his back all the way up to his neck. He did visit urgent care once he got to [place] but they did little. He reported that after his fall he was having more discomfort sleeping and did not find the towel roll at occipital protuberance has helpful.    Objective   See Exercise, Manual, and Modality Logs for complete treatment.     Assessment/Plan  Sheriff Chairez noted he felt much better leaving session than when he arrived. He demonstrated improved AROM at his neck. Plan on returning to exercise next session if he is feeling better.     Visit Diagnoses:    ICD-10-CM ICD-9-CM   1. Cervical paraspinal muscle spasm  M62.838 728.85   2. Pain, neck  M54.2 723.1   3. Cervical spine instability  M53.2X2 723.9   4. Painful cervical ROM  M54.2 723.1       Progress per Plan of Care and Progress strengthening /stabilization /functional activity       Timed:  Manual Therapy:    24     mins  91151;  Therapeutic Exercise:         mins  15184;     Neuromuscular Brittany:        mins  12384;    Therapeutic Activity:          mins  69048;     Gait Training:           mins  53295;     Ultrasound:          mins  15050;    Electrical Stimulation:         mins  21653 ( );  Aquatics  __   mins   09554    Untimed:  Electrical Stimulation:         mins  57296 ( );  Mechanical Traction:         mins  25767;     Timed Treatment:   24   mins   Total Treatment:     24    mins    Electronically Signed:  Rosanna Yarbrough PTA  Physical Therapist Assistant    [place] PTA license [id number]

## 2025-02-13 ENCOUNTER — TREATMENT (OUTPATIENT)
Dept: PHYSICAL THERAPY | Facility: CLINIC | Age: 65
End: 2025-02-13
Payer: COMMERCIAL

## 2025-02-13 DIAGNOSIS — M53.2X2 CERVICAL SPINE INSTABILITY: ICD-10-CM

## 2025-02-13 DIAGNOSIS — M54.2 PAIN, NECK: ICD-10-CM

## 2025-02-13 DIAGNOSIS — M62.838 CERVICAL PARASPINAL MUSCLE SPASM: Primary | ICD-10-CM

## 2025-02-13 DIAGNOSIS — M54.2 PAINFUL CERVICAL ROM: ICD-10-CM

## 2025-02-13 NOTE — PROGRESS NOTES
Physical Therapy Daily Treatment Note  [place] [facility] [address] [place] [address]    Patient: Juan Miguel Chairez   : 1960  Referring practitioner: MICHAELA Bhat*  Date of Initial Visit: Type: THERAPY  Noted: 2024  Today's Date: 2025  Patient seen for 12 sessions           Subjective  Juan Miguel Chairez reports:  he is feeling better since his fall. At completion of session today, Sheriff Chairez remarked that he could feel that he was able to side bend to each side better but the most gain on the R.     Objective   See Exercise, Manual, and Modality Logs for complete treatment.     Assessment/Plan  Sheriff Chairez did well with all exercises, showing improved posture during all activities. He again noted reduced tension in his shlds and neck after care as well as demonstrating improved AROM at neck. Continue per POC to attain best possible outcome.     Visit Diagnoses:    ICD-10-CM ICD-9-CM   1. Cervical paraspinal muscle spasm  M62.838 728.85   2. Pain, neck  M54.2 723.1   3. Cervical spine instability  M53.2X2 723.9   4. Painful cervical ROM  M54.2 723.1       Progress per Plan of Care and Progress strengthening /stabilization /functional activity       Timed:  Manual Therapy:    25     mins  36225;  Therapeutic Exercise:    15     mins  02944;     Neuromuscular Brittany:        mins  68192;    Therapeutic Activity:          mins  36402;     Gait Training:           mins  86799;     Ultrasound:          mins  04589;    Electrical Stimulation:         mins  25025 ( );  Aquatics  __   mins   33691    Untimed:  Electrical Stimulation:         mins  43278 ( );  Mechanical Traction:         mins  91469;     Timed Treatment:   40   mins   Total Treatment:     40   mins    Electronically Signed:  Rosanna Yarbrough PTA  Physical Therapist Assistant    [place] PTA license [id number]

## 2025-02-18 ENCOUNTER — TREATMENT (OUTPATIENT)
Dept: PHYSICAL THERAPY | Facility: CLINIC | Age: 65
End: 2025-02-18
Payer: COMMERCIAL

## 2025-02-18 DIAGNOSIS — M54.2 PAINFUL CERVICAL ROM: ICD-10-CM

## 2025-02-18 DIAGNOSIS — M62.838 CERVICAL PARASPINAL MUSCLE SPASM: Primary | ICD-10-CM

## 2025-02-18 DIAGNOSIS — M53.2X2 CERVICAL SPINE INSTABILITY: ICD-10-CM

## 2025-02-18 DIAGNOSIS — M54.2 PAIN, NECK: ICD-10-CM

## 2025-02-18 PROCEDURE — 97140 MANUAL THERAPY 1/> REGIONS: CPT | Performed by: PHYSICAL THERAPIST

## 2025-02-18 PROCEDURE — 97110 THERAPEUTIC EXERCISES: CPT | Performed by: PHYSICAL THERAPIST

## 2025-02-18 NOTE — PROGRESS NOTES
Progress Assessment  [address] [place] [address]        Patient: Juan Miguel Chairez   : 1960  Diagnosis/ICD-10 Code:  Cervical paraspinal muscle spasm [M62.838]  Referring practitioner: MICHAELA Bhat*  Date of Initial Visit: Type: THERAPY  Noted: 2024  Today's Date: 2025  Patient seen for 13 sessions      Subjective:     Subjective Questionnaire: NDI: 4 = 8% limitation  Clinical Progress: improved  Home Program Compliance: Yes  Treatment has included: therapeutic exercise, neuromuscular re-education, manual therapy, and therapeutic activity    Subjective : Juan Miguel Chairez reports: he is doing better, but still sore from his fall. He feels like his back is doing better, but does have some pain in the right shoulder and neck.    Current Pain 3/10      Objective       Postural Observations        Static Posture      Head  Forward, rotated right and tilted left.     Shoulders  Rounded.     Thoracic Spine  Hyperkyphosis.     Additional Postural Observation Details  Noted cervical muscle atrophy anterior and posterior just lateral to midline      Palpation   Left   Muscle spasm in the cervical paraspinals and sternocleidomastoid.   Tenderness of the cervical paraspinals and suboccipitals.      Right   Muscle spasm in the cervical paraspinals and sternocleidomastoid. Tenderness of the cervical paraspinals and suboccipitals.      Additional Palpation Details  Hypomobile CPA's cervical spine.           Active Range of Motion   Cervical/Thoracic Spine   Cervical     Flexion: 34 degrees   Extension: 45 degrees   Left lateral flexion: 25 degrees   Right lateral flexion: 20 degrees   Left rotation: 55 degrees   Right rotation: 67 degrees      Strength/Myotome Testing   Cervical Spine   Neck extension: 4  Neck flexion: 4     Left   Neck lateral flexion (C3): 4     Right   Neck lateral flexion (C3): 4     Left Shoulder      Planes of Motion   Flexion: 4+   Extension: 4      Isolated Muscles    Middle trapezius: 4-      Right Shoulder      Planes of Motion   Flexion: 4+   Extension: 4      Isolated Muscles   Middle trapezius: 4-      Assessment/Plan    Assessment details: The patient presents to physical therapy with complaints of neck pain with muscle weakness and atrophy leading to altered posturing. He did have radiation treatments years ago likely contributing to muscle atrophy. He has had some regression secondary to a recent fall he took. He is expected to return to his previous level of progress in therapy.  The patient presents with associated upper extremity weakness, postural deficits, cervical stiffness, and functional deficits (NDI). The patient would benefit from ongoing skilled PT intervention to address the above mentioned functional limitations.     Prognosis: good     Goals  Plan Goals: CERVICAL PROBLEMS     1. The patient has limited ROM.                       LTG 1: 12 weeks:  The patient will demonstrate 70° of B cervical spine rotation in order to adequately monitor blind spots while driving and improve ability to perform activities of daily living.                          STATUS:  Not met  STG 1a: 6 weeks:  The patient will demonstrate 65° of B cervical spine rotation in order to adequately monitor blind spots while driving and improve ability to perform activities of daily living.                           STATUS:  Not met                                2. The patient has complaints of pain.              LTG 2: 12 weeks:  The patient will report 2/10 pain in order to more easily tolerate activities of daily living and improve sleep quality.                          STATUS:  MET              STG 2a: 6 weeks:  The patient will report 4/10 pain.                          STATUS:  MET                                 3. Carrying, Moving, and Handling Objects Functional Limitation                               LTG 3: 12 weeks:  The patient will demonstrate 10% limitation by achieving a  score of 5 on the Neck Disability Index.                          STATUS:  Not met              STG 3a: 6 weeks:  The patient will demonstrate 14% limitation by achieving a score of 7 on the Neck Disability Index.                            STATUS:  MET    Progress toward previous goals: Partially Met    See Exercise, Manual, and Modality Logs for complete treatment.         Recommendations: Continue as planned  Timeframe: 1 month  Prognosis to achieve goals: good    PT Signature: Osman Vazquez, PT    Electronically signed 2025    [place] License: PT - [id number]     Based upon review of the patient's progress and continued therapy plan, it is my medical opinion that Juan Miguel Chairez should continue physical therapy treatment at [facility] PH[facility]  [address]  [place] [address] [phone].      Timed:         Manual Therapy:    25     mins  83819;     Therapeutic Exercise:    14     mins  83032;     Neuromuscular Brittany:    0    mins  18645;    Therapeutic Activity:     0     mins  01657;     Gait Trainin     mins  64784;     Ultrasound:     0     mins  67174;    Self Care                       0     mins   90665  Aquatic                          0     mins 82269        Timed Treatment:   39   mins   Total Treatment:     39   mins      I certify that the therapy services are furnished while this patient is under my care.  The services outlined above are required by this patient, and will be reviewed every 90 days.

## 2025-02-20 ENCOUNTER — TREATMENT (OUTPATIENT)
Dept: PHYSICAL THERAPY | Facility: CLINIC | Age: 65
End: 2025-02-20
Payer: COMMERCIAL

## 2025-02-20 DIAGNOSIS — M54.2 PAINFUL CERVICAL ROM: ICD-10-CM

## 2025-02-20 DIAGNOSIS — M54.2 PAIN, NECK: ICD-10-CM

## 2025-02-20 DIAGNOSIS — M53.2X2 CERVICAL SPINE INSTABILITY: ICD-10-CM

## 2025-02-20 DIAGNOSIS — M62.838 CERVICAL PARASPINAL MUSCLE SPASM: Primary | ICD-10-CM

## 2025-02-20 NOTE — PROGRESS NOTES
Physical Therapy Daily Treatment Note  [address] [place] [address]    Patient: Juan Miguel Chairez   : 1960  Diagnosis/ICD-10 Code:  Cervical paraspinal muscle spasm [M62.838]  Referring practitioner: MICHAELA Bhat*  Date of Initial Visit: Type: THERAPY  Noted: 2024  Today's Date: 2025  Patient seen for 14 sessions           Subjective : Patient reports he is doing good today, his right shoulder isn't hurting too bad.     Objective   See Exercise, Manual, and Modality Logs for complete treatment.       Assessment/Plan : Pt tolerated today's session well. Able to progress with posture and endurance training, no increase in pain. Pt would benefit from continued skilled therapy to address deficits. Progress per plan of care.             Timed:  Manual Therapy:    12     mins  49257;  Therapeutic Exercise:    10     mins  98168;     Neuromuscular Brittany:    0    mins  31524;    Therapeutic Activity:     8     mins  31046;     Gait Trainin     mins  29049;     Ultrasound:     0     mins  59210;    Aquatic Therapy    0     mins  92457;        Timed Treatment:   30   mins   Total Treatment:     30   mins    Osman Vazquez PT  Physical Therapist    Electronically signed 2025    [place] License: PT - [id number]   
Normal vision: sees adequately in most situations; can see medication labels, newsprint

## 2025-02-25 ENCOUNTER — TREATMENT (OUTPATIENT)
Dept: PHYSICAL THERAPY | Facility: CLINIC | Age: 65
End: 2025-02-25
Payer: COMMERCIAL

## 2025-02-25 DIAGNOSIS — M54.2 PAINFUL CERVICAL ROM: ICD-10-CM

## 2025-02-25 DIAGNOSIS — M53.2X2 CERVICAL SPINE INSTABILITY: ICD-10-CM

## 2025-02-25 DIAGNOSIS — M54.2 PAIN, NECK: ICD-10-CM

## 2025-02-25 DIAGNOSIS — M62.838 CERVICAL PARASPINAL MUSCLE SPASM: Primary | ICD-10-CM

## 2025-02-25 NOTE — PROGRESS NOTES
"Physical Therapy Daily Treatment Note  [place] [facility] [address] [place] [address]    Patient: Juan Miguel Chairez   : 1960  Referring practitioner: MICHAELA Bhat*  Date of Initial Visit: Type: THERAPY  Noted: 2024  Today's Date: 2025  Patient seen for 15 sessions           Subjective  Juan Miguel Chairez reports: his neck isn't feeling so bad. 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 Muscle weakness continues to accentuate fwd head positioning. Did question him as to bone density testing due to medical history and kyphosis present. Continue as outlined to aid return to PLOF.    Visit Diagnoses:    ICD-10-CM ICD-9-CM   1. Cervical paraspinal muscle spasm  M62.838 728.85   2. Pain, neck  M54.2 723.1   3. Cervical spine instability  M53.2X2 723.9   4. Painful cervical ROM  M54.2 723.1       Progress per Plan of Care and Progress strengthening /stabilization /functional activity       Timed:  Manual Therapy:    10     mins  68716;  Therapeutic Exercise:    11     mins  22587;     Neuromuscular Brittany:    10    mins  71602;    Therapeutic Activity:          mins  24540;     Gait Training:           mins  37828;     Ultrasound:          mins  77806;    Electrical Stimulation:         mins  23711 ( );  Aquatics  __   mins   58997    Untimed:  Electrical Stimulation:         mins  12578 ( );  Mechanical Traction:         mins  77324;     Timed Treatment:   31   mins   Total Treatment:     31   mins    Electronically Signed:  Rosanna Yarbrough PTA  Physical Therapist Assistant    [place] PTA license [id number]            "

## 2025-02-27 ENCOUNTER — TREATMENT (OUTPATIENT)
Dept: PHYSICAL THERAPY | Facility: CLINIC | Age: 65
End: 2025-02-27
Payer: COMMERCIAL

## 2025-02-27 DIAGNOSIS — M54.2 PAINFUL CERVICAL ROM: ICD-10-CM

## 2025-02-27 DIAGNOSIS — M62.838 CERVICAL PARASPINAL MUSCLE SPASM: Primary | ICD-10-CM

## 2025-02-27 DIAGNOSIS — M54.2 PAIN, NECK: ICD-10-CM

## 2025-02-27 DIAGNOSIS — M53.2X2 CERVICAL SPINE INSTABILITY: ICD-10-CM

## 2025-02-27 NOTE — PROGRESS NOTES
Physical Therapy Daily Treatment Note  [address] [place] [address]    Patient: Juan Miguel Chairez   : 1960  Diagnosis/ICD-10 Code:  Cervical paraspinal muscle spasm [M62.838]  Referring practitioner: MICHAELA Bhat*  Date of Initial Visit: Type: THERAPY  Noted: 2024  Today's Date: 2025  Patient seen for 16 sessions           Subjective : Patient reports he is doing better, not having as much pain today.     Objective   See Exercise, Manual, and Modality Logs for complete treatment.       Assessment/Plan : Pt tolerated today's session well. Continued working on progression of strengthening and endurance training. Pt would benefit from continued skilled therapy to address deficits. Progress per plan of care.             Timed:  Manual Therapy:    10     mins  56240;  Therapeutic Exercise:    8     mins  27878;     Neuromuscular Brittany:    0    mins  14129;    Therapeutic Activity:     10     mins  67772;     Gait Trainin     mins  18093;     Ultrasound:     0     mins  34492;    Aquatic Therapy    0     mins  70660;    Untimed:  Electrical Stimulation:    0     mins  16783 ( );  Dry Needling     0     mins self-pay;  Mechanical Traction    0     mins  77111  Moist Heat     0     mins  No charge  Canalith Repos              0     mins 30575    Timed Treatment:   28   mins   Total Treatment:     28   mins    Osman Vazquez PT  Physical Therapist    Electronically signed 2025    [place] License: PT - [id number]

## 2025-03-04 ENCOUNTER — TREATMENT (OUTPATIENT)
Dept: PHYSICAL THERAPY | Facility: CLINIC | Age: 65
End: 2025-03-04
Payer: COMMERCIAL

## 2025-03-04 DIAGNOSIS — M54.2 PAIN, NECK: ICD-10-CM

## 2025-03-04 DIAGNOSIS — M53.2X2 CERVICAL SPINE INSTABILITY: ICD-10-CM

## 2025-03-04 DIAGNOSIS — M54.2 PAINFUL CERVICAL ROM: ICD-10-CM

## 2025-03-04 DIAGNOSIS — M62.838 CERVICAL PARASPINAL MUSCLE SPASM: Primary | ICD-10-CM

## 2025-03-04 NOTE — PROGRESS NOTES
Physical Therapy Daily Treatment Note  [place] [facility] [address] [place] [address]    Patient: Juan Miguel Chairez   : 1960  Referring practitioner: MICHAELA Bhat*  Date of Initial Visit: Type: THERAPY  Noted: 2024  Today's Date: 3/4/2025  Patient seen for 17 sessions           Subjective  Juan Miguel Chairez reports:  he continues to feel better. Sheriff Chairez noted that he can recognize postural improvements. He reported his wife has also commented about his improved posture.     Objective   See Exercise, Manual, and Modality Logs for complete treatment.     Assessment/Plan  Will continue to address strength deficits, working towards HEP for continued benefits. Each leaves each session with further advancements in well being.    Visit Diagnoses:    ICD-10-CM ICD-9-CM   1. Cervical paraspinal muscle spasm  M62.838 728.85   2. Pain, neck  M54.2 723.1   3. Cervical spine instability  M53.2X2 723.9   4. Painful cervical ROM  M54.2 723.1       Progress per Plan of Care and Progress strengthening /stabilization /functional activity       Timed:  Manual Therapy:    10     mins  05238;  Therapeutic Exercise:    12     mins  69853;     Neuromuscular Brittany:    8    mins  73349;    Therapeutic Activity:          mins  27777;     Gait Training:           mins  27925;     Ultrasound:          mins  67213;    Electrical Stimulation:         mins  98337 ( );  Aquatics  __   mins   06175    Untimed:  Electrical Stimulation:         mins  65388 ( );  Mechanical Traction:         mins  46218;     Timed Treatment:   30   mins   Total Treatment:     30   mins    Electronically Signed:  Rosanna Yarbrough PTA  Physical Therapist Assistant    [place] PTA license [id number]

## 2025-03-06 ENCOUNTER — TREATMENT (OUTPATIENT)
Dept: PHYSICAL THERAPY | Facility: CLINIC | Age: 65
End: 2025-03-06
Payer: COMMERCIAL

## 2025-03-06 DIAGNOSIS — M54.2 PAIN, NECK: ICD-10-CM

## 2025-03-06 DIAGNOSIS — M54.2 PAINFUL CERVICAL ROM: ICD-10-CM

## 2025-03-06 DIAGNOSIS — M62.838 CERVICAL PARASPINAL MUSCLE SPASM: Primary | ICD-10-CM

## 2025-03-06 DIAGNOSIS — M53.2X2 CERVICAL SPINE INSTABILITY: ICD-10-CM

## 2025-03-06 NOTE — PROGRESS NOTES
Physical Therapy Daily Treatment Note  [address] [place] [address]    Patient: Juan Miguel Chairez   : 1960  Diagnosis/ICD-10 Code:  Cervical paraspinal muscle spasm [M62.838]  Referring practitioner: MICHAELA Bhat*  Date of Initial Visit: Type: THERAPY  Noted: 2024  Today's Date: 3/6/2025  Patient seen for 18 sessions           Subjective : Patient reports he feels like he is doing better, getting back to where he was before falling.    Objective   See Exercise, Manual, and Modality Logs for complete treatment.       Assessment/Plan : Pt tolerated today's session well. Continued to work on postural endurance and strengthening along with cervical mobility. Pt would benefit from continued skilled therapy to address deficits. Progress per plan of care.             Timed:  Manual Therapy:    10     mins  61413;  Therapeutic Exercise:    8     mins  77017;     Neuromuscular Brittany:    0    mins  36474;    Therapeutic Activity:     10     mins  50511;     Gait Trainin     mins  82396;     Ultrasound:     0     mins  12247;    Aquatic Therapy    0     mins  72658;    Untimed:  Electrical Stimulation:    0     mins  52170 ( );  Dry Needling     0     mins self-pay;  Mechanical Traction    0     mins  80030  Moist Heat     0     mins  No charge  Canalith Repos              0     mins 38565    Timed Treatment:   28   mins   Total Treatment:     28   mins    Osman Vazquez PT  Physical Therapist    Electronically signed 3/6/2025    [place] License: PT - [id number]

## 2025-03-11 ENCOUNTER — TREATMENT (OUTPATIENT)
Dept: PHYSICAL THERAPY | Facility: CLINIC | Age: 65
End: 2025-03-11
Payer: COMMERCIAL

## 2025-03-11 DIAGNOSIS — M53.2X2 CERVICAL SPINE INSTABILITY: ICD-10-CM

## 2025-03-11 DIAGNOSIS — M62.838 CERVICAL PARASPINAL MUSCLE SPASM: Primary | ICD-10-CM

## 2025-03-11 DIAGNOSIS — M54.2 PAINFUL CERVICAL ROM: ICD-10-CM

## 2025-03-11 DIAGNOSIS — M54.2 PAIN, NECK: ICD-10-CM

## 2025-03-11 NOTE — PROGRESS NOTES
Physical Therapy Daily Treatment Note  [address] [place] [address]    Patient: Juan Miguel Chairez   : 1960  Diagnosis/ICD-10 Code:  Cervical paraspinal muscle spasm [M62.838]  Referring practitioner: MICHAELA Bhat*  Date of Initial Visit: Type: THERAPY  Noted: 2024  Today's Date: 3/11/2025  Patient seen for 19 sessions           Subjective : Patient reports he is having some pain in his left shoulder, seems like its in the front and on top especially with lifting overhead.    Objective   See Exercise, Manual, and Modality Logs for complete treatment.       Assessment/Plan : Pt tolerated today's session well. Continued with interventions targeting improvement in ROM and pain relief. RE-evaluation next visit. Pt would benefit from continued skilled therapy to address deficits. Progress per plan of care.             Timed:  Manual Therapy:    24     mins  76225;  Therapeutic Exercise:    10     mins  01669;     Neuromuscular Brittany:    0    mins  37488;    Therapeutic Activity:     0     mins  64788;     Gait Trainin     mins  81669;     Ultrasound:     0     mins  94484;    Aquatic Therapy    0     mins  45696;    Untimed:  Electrical Stimulation:    0     mins  53751 ( );  Dry Needling     0     mins self-pay;  Mechanical Traction    0     mins  64030  Moist Heat     0     mins  No charge  Canalith Repos              0     mins 05023    Timed Treatment:   34   mins   Total Treatment:     34   mins    Osman Vazquez PT  Physical Therapist    Electronically signed 3/11/2025    [place] License: PT - [id number]

## 2025-03-14 ENCOUNTER — TELEPHONE (OUTPATIENT)
Dept: ORTHOPEDICS | Facility: OTHER | Age: 65
End: 2025-03-14
Payer: COMMERCIAL

## 2025-03-20 ENCOUNTER — TELEPHONE (OUTPATIENT)
Dept: PHYSICAL THERAPY | Facility: CLINIC | Age: 65
End: 2025-03-20

## 2025-03-20 NOTE — TELEPHONE ENCOUNTER
"  Caller: Juan Miguel Chairez \"Harjinder\"    Relationship: Self       What was the call regarding: CONFLICT          "

## 2025-07-19 DIAGNOSIS — I10 ESSENTIAL HYPERTENSION: ICD-10-CM

## 2025-07-21 RX ORDER — METOPROLOL TARTRATE 50 MG
50 TABLET ORAL EVERY 12 HOURS
Qty: 180 TABLET | Refills: 1 | Status: SHIPPED | OUTPATIENT
Start: 2025-07-21

## 2025-08-14 ENCOUNTER — OFFICE VISIT (OUTPATIENT)
Dept: INTERNAL MEDICINE | Facility: CLINIC | Age: 65
End: 2025-08-14
Payer: COMMERCIAL

## 2025-08-14 VITALS
OXYGEN SATURATION: 95 % | HEART RATE: 69 BPM | SYSTOLIC BLOOD PRESSURE: 145 MMHG | HEIGHT: 67 IN | BODY MASS INDEX: 25.79 KG/M2 | DIASTOLIC BLOOD PRESSURE: 80 MMHG | WEIGHT: 164.3 LBS

## 2025-08-14 DIAGNOSIS — E03.9 ACQUIRED HYPOTHYROIDISM: ICD-10-CM

## 2025-08-14 DIAGNOSIS — I10 ESSENTIAL HYPERTENSION: Primary | ICD-10-CM

## 2025-08-14 DIAGNOSIS — I25.10 CORONARY ARTERY DISEASE INVOLVING NATIVE CORONARY ARTERY OF NATIVE HEART WITHOUT ANGINA PECTORIS: ICD-10-CM

## 2025-08-14 DIAGNOSIS — E78.2 MIXED HYPERLIPIDEMIA: ICD-10-CM

## 2025-08-14 LAB
ALBUMIN SERPL-MCNC: 3.9 G/DL (ref 3.5–5.2)
ALBUMIN/GLOB SERPL: 1.2 G/DL
ALP SERPL-CCNC: 128 U/L (ref 39–117)
ALT SERPL W P-5'-P-CCNC: 22 U/L (ref 1–41)
ANION GAP SERPL CALCULATED.3IONS-SCNC: 12 MMOL/L (ref 5–15)
AST SERPL-CCNC: 37 U/L (ref 1–40)
BASOPHILS # BLD AUTO: 0.09 10*3/MM3 (ref 0–0.2)
BASOPHILS NFR BLD AUTO: 1.2 % (ref 0–1.5)
BILIRUB SERPL-MCNC: 0.3 MG/DL (ref 0–1.2)
BUN SERPL-MCNC: 20 MG/DL (ref 8–23)
BUN/CREAT SERPL: 20 (ref 7–25)
CALCIUM SPEC-SCNC: 9.1 MG/DL (ref 8.6–10.5)
CHLORIDE SERPL-SCNC: 98 MMOL/L (ref 98–107)
CHOLEST SERPL-MCNC: 131 MG/DL (ref 0–200)
CO2 SERPL-SCNC: 25 MMOL/L (ref 22–29)
CREAT SERPL-MCNC: 1 MG/DL (ref 0.76–1.27)
DEPRECATED RDW RBC AUTO: 49.8 FL (ref 37–54)
EGFRCR SERPLBLD CKD-EPI 2021: 84.6 ML/MIN/1.73
EOSINOPHIL # BLD AUTO: 0.14 10*3/MM3 (ref 0–0.4)
EOSINOPHIL NFR BLD AUTO: 1.9 % (ref 0.3–6.2)
ERYTHROCYTE [DISTWIDTH] IN BLOOD BY AUTOMATED COUNT: 16.7 % (ref 12.3–15.4)
GLOBULIN UR ELPH-MCNC: 3.2 GM/DL
GLUCOSE SERPL-MCNC: 97 MG/DL (ref 65–99)
HCT VFR BLD AUTO: 38.8 % (ref 37.5–51)
HDLC SERPL-MCNC: 49 MG/DL (ref 40–60)
HGB BLD-MCNC: 12 G/DL (ref 13–17.7)
IMM GRANULOCYTES # BLD AUTO: 0.01 10*3/MM3 (ref 0–0.05)
IMM GRANULOCYTES NFR BLD AUTO: 0.1 % (ref 0–0.5)
LDLC SERPL CALC-MCNC: 68 MG/DL (ref 0–100)
LDLC/HDLC SERPL: 1.4 {RATIO}
LYMPHOCYTES # BLD AUTO: 1.87 10*3/MM3 (ref 0.7–3.1)
LYMPHOCYTES NFR BLD AUTO: 24.9 % (ref 19.6–45.3)
MCH RBC QN AUTO: 25.5 PG (ref 26.6–33)
MCHC RBC AUTO-ENTMCNC: 30.9 G/DL (ref 31.5–35.7)
MCV RBC AUTO: 82.6 FL (ref 79–97)
MONOCYTES # BLD AUTO: 0.74 10*3/MM3 (ref 0.1–0.9)
MONOCYTES NFR BLD AUTO: 9.9 % (ref 5–12)
NEUTROPHILS NFR BLD AUTO: 4.66 10*3/MM3 (ref 1.7–7)
NEUTROPHILS NFR BLD AUTO: 62 % (ref 42.7–76)
NRBC BLD AUTO-RTO: 0 /100 WBC (ref 0–0.2)
PLATELET # BLD AUTO: 302 10*3/MM3 (ref 140–450)
PMV BLD AUTO: 10.5 FL (ref 6–12)
POTASSIUM SERPL-SCNC: 4.9 MMOL/L (ref 3.5–5.2)
PROT SERPL-MCNC: 7.1 G/DL (ref 6–8.5)
RBC # BLD AUTO: 4.7 10*6/MM3 (ref 4.14–5.8)
SODIUM SERPL-SCNC: 135 MMOL/L (ref 136–145)
TRIGL SERPL-MCNC: 68 MG/DL (ref 0–150)
TSH SERPL DL<=0.05 MIU/L-ACNC: 1.51 UIU/ML (ref 0.27–4.2)
VLDLC SERPL-MCNC: 14 MG/DL (ref 5–40)
WBC NRBC COR # BLD AUTO: 7.51 10*3/MM3 (ref 3.4–10.8)

## 2025-08-14 PROCEDURE — 80061 LIPID PANEL: CPT | Performed by: INTERNAL MEDICINE

## 2025-08-14 PROCEDURE — 99214 OFFICE O/P EST MOD 30 MIN: CPT | Performed by: INTERNAL MEDICINE

## 2025-08-14 PROCEDURE — 80050 GENERAL HEALTH PANEL: CPT | Performed by: INTERNAL MEDICINE

## 2025-08-25 ENCOUNTER — TELEPHONE (OUTPATIENT)
Dept: INTERNAL MEDICINE | Facility: CLINIC | Age: 65
End: 2025-08-25
Payer: COMMERCIAL

## 2025-08-27 ENCOUNTER — DOCUMENTATION (OUTPATIENT)
Dept: INTERNAL MEDICINE | Facility: CLINIC | Age: 65
End: 2025-08-27
Payer: COMMERCIAL

## 2025-08-28 ENCOUNTER — TELEPHONE (OUTPATIENT)
Dept: INTERNAL MEDICINE | Facility: CLINIC | Age: 65
End: 2025-08-28
Payer: COMMERCIAL

## (undated) DEVICE — SOL IRRG H2O PL/BG 1000ML STRL

## (undated) DEVICE — CONN JET HYDRA H20 AUXILIARY DISP

## (undated) DEVICE — Device: Brand: DEFENDO AIR/WATER/SUCTION AND BIOPSY VALVE

## (undated) DEVICE — Device

## (undated) DEVICE — LINER SURG CANSTR SXN S/RIGD 1500CC

## (undated) DEVICE — SOLIDIFIER LIQLOC PLS 1500CC BT